# Patient Record
Sex: FEMALE | Race: WHITE | NOT HISPANIC OR LATINO | Employment: UNEMPLOYED | URBAN - METROPOLITAN AREA
[De-identification: names, ages, dates, MRNs, and addresses within clinical notes are randomized per-mention and may not be internally consistent; named-entity substitution may affect disease eponyms.]

---

## 2019-10-28 ENCOUNTER — OFFICE VISIT CONVERTED (OUTPATIENT)
Dept: PLASTIC SURGERY | Facility: CLINIC | Age: 34
End: 2019-10-28
Attending: PLASTIC SURGERY

## 2019-12-26 ENCOUNTER — HOSPITAL ENCOUNTER (OUTPATIENT)
Dept: OTHER | Facility: HOSPITAL | Age: 34
Discharge: HOME | End: 2019-12-26
Attending: PLASTIC SURGERY

## 2019-12-26 LAB — NICOTINE UR-MCNC: NEGATIVE NG/ML

## 2019-12-27 ENCOUNTER — OFFICE VISIT CONVERTED (OUTPATIENT)
Dept: PLASTIC SURGERY | Facility: CLINIC | Age: 34
End: 2019-12-27
Attending: PLASTIC SURGERY

## 2020-01-08 ENCOUNTER — HOSPITAL ENCOUNTER (OUTPATIENT)
Dept: PERIOP | Facility: HOSPITAL | Age: 35
Setting detail: HOSPITAL OUTPATIENT SURGERY
Discharge: HOME | End: 2020-01-08
Attending: PLASTIC SURGERY

## 2020-01-08 ENCOUNTER — PROCEDURE VISIT CONVERTED (OUTPATIENT)
Dept: OTHER | Facility: HOSPITAL | Age: 35
End: 2020-01-08
Attending: PLASTIC SURGERY

## 2020-01-08 LAB
HCG UR QL: NEGATIVE
NICOTINE UR-MCNC: NEGATIVE NG/ML

## 2020-01-10 ENCOUNTER — OFFICE VISIT CONVERTED (OUTPATIENT)
Dept: PLASTIC SURGERY | Facility: CLINIC | Age: 35
End: 2020-01-10
Attending: PLASTIC SURGERY

## 2020-02-07 ENCOUNTER — OFFICE VISIT CONVERTED (OUTPATIENT)
Dept: PLASTIC SURGERY | Facility: CLINIC | Age: 35
End: 2020-02-07
Attending: PLASTIC SURGERY

## 2020-04-30 ENCOUNTER — TELEMEDICINE CONVERTED (OUTPATIENT)
Dept: PLASTIC SURGERY | Facility: CLINIC | Age: 35
End: 2020-04-30
Attending: PLASTIC SURGERY

## 2020-06-25 ENCOUNTER — OFFICE VISIT CONVERTED (OUTPATIENT)
Dept: PLASTIC SURGERY | Facility: CLINIC | Age: 35
End: 2020-06-25
Attending: PLASTIC SURGERY

## 2020-06-25 ENCOUNTER — CONVERSION ENCOUNTER (OUTPATIENT)
Dept: PLASTIC SURGERY | Facility: CLINIC | Age: 35
End: 2020-06-25

## 2020-07-20 ENCOUNTER — HOSPITAL ENCOUNTER (OUTPATIENT)
Dept: PREADMISSION TESTING | Facility: HOSPITAL | Age: 35
Discharge: HOME | End: 2020-07-20
Attending: PLASTIC SURGERY

## 2020-07-20 LAB — SARS-COV-2 RNA SPEC QL NAA+PROBE: NOT DETECTED

## 2020-07-22 ENCOUNTER — HOSPITAL ENCOUNTER (OUTPATIENT)
Dept: PERIOP | Facility: HOSPITAL | Age: 35
Setting detail: HOSPITAL OUTPATIENT SURGERY
Discharge: HOME | End: 2020-07-22
Attending: PLASTIC SURGERY

## 2020-07-22 LAB
HCG UR QL: NEGATIVE
NICOTINE UR-MCNC: POSITIVE NG/ML

## 2020-07-27 ENCOUNTER — OFFICE VISIT CONVERTED (OUTPATIENT)
Dept: PLASTIC SURGERY | Facility: CLINIC | Age: 35
End: 2020-07-27
Attending: PLASTIC SURGERY

## 2020-08-27 ENCOUNTER — OFFICE VISIT CONVERTED (OUTPATIENT)
Dept: PLASTIC SURGERY | Facility: CLINIC | Age: 35
End: 2020-08-27
Attending: PLASTIC SURGERY

## 2020-10-29 ENCOUNTER — OFFICE VISIT CONVERTED (OUTPATIENT)
Dept: PLASTIC SURGERY | Facility: CLINIC | Age: 35
End: 2020-10-29
Attending: PLASTIC SURGERY

## 2020-11-09 ENCOUNTER — HOSPITAL ENCOUNTER (OUTPATIENT)
Dept: MRI IMAGING | Facility: HOSPITAL | Age: 35
Discharge: HOME | End: 2020-11-09

## 2021-03-04 ENCOUNTER — HOSPITAL ENCOUNTER (OUTPATIENT)
Dept: GENERAL RADIOLOGY | Facility: HOSPITAL | Age: 36
Discharge: HOME | End: 2021-03-04
Attending: NURSE PRACTITIONER

## 2021-05-12 NOTE — PROGRESS NOTES
"   Progress Note      Patient Name: Sead Simmons   Patient ID: [id number]   Sex: Female   YOB: 1985    Primary Care Provider: Tad OCHOA   Referring Provider: Aleyda QUINTEROS    Visit Date: April 30, 2020    Provider: Nicky Boles MD   Location: [facility]   Location Address: [address] [place] [address]  [id number]   Location Phone: [phone]          History Of Present Illness  Seda Simmons is a 34 year old /White female who presents to the office today as a consult from Aleyda QUINTEROS. S/p right breast scar revision, fat grafting to the right breast and bilateral breast augmentation 1/8/2020. doing well   Video Conferencing Visit  Seda Simmons is a 35 year old /White female who is presenting for evaluation via video conferencing. Verbal consent obtained before beginning visit. Doing well but implant appears to have slid down on left after feeling \" pop\"   The following staff were present during this visit: MYKE Orozco       Past Medical History  Allergies; Anemia; Anxiety; Arthritis; Chest pain; Depression; GERD; Head injury; Hemorrhoids; Left Gluteus Medius Insufficiency; Limb Swelling; Migraine Headaches; Seasonal allergies; Shortness of Breath         Past Surgical History  Breast augmentation; Dilation and Curettage; endoscopy; Fat grafting; Joint Surgery; Metal implants; Removal of hardware; Splenectomy         Medication List  ibuprofen 200 mg oral tablet; Imitrex 100 mg oral tablet         Allergy List  Bee Stings; Latex Exam Gloves; Metrogel         Family Medical History  Colon Neoplasm, Malignant; Lung Neoplasm, Malignant; Heart Disease; Cancer, Unspecified; Blood Clot; Diabetes; Uterine Cancer, Family History; Family history of certain chronic disabling diseases; arthritis; Osteoporosis         Social History  Alcohol (Current some day); Alcohol Use (Current " some day); Claustophobic (Unknown); .; lives with children; lives with other; lives with parents; Recreational Drug Use (Never); Single.; Student.; Tobacco (Former); Working         Review of Systems  · Constitutional  o Denies  o : fatigue, night sweats  · Eyes  o Denies  o : double vision, blurred vision  · HENT  o Denies  o : vertigo, recent head injury  · Breasts  o Denies  o : abnormal changes in breast size, additional breast symptoms except as noted in the HPI  · Cardiovascular  o Denies  o : chest pain, irregular heart beats  · Respiratory  o Denies  o : shortness of breath, productive cough  · Gastrointestinal  o Denies  o : nausea, vomiting  · Genitourinary  o Denies  o : dysuria, urinary retention  · Integument  o Denies  o : hair growth change, new skin lesions  · Neurologic  o Denies  o : altered mental status, seizures  · Musculoskeletal  o Denies  o : joint swelling, limitation of motion  · Endocrine  o Denies  o : cold intolerance, heat intolerance  · Heme-Lymph  o Denies  o : petechiae, lymph node enlargement or tenderness  · Allergic-Immunologic  o Denies  o : frequent illnesses      Physical Examination  · Constitutional  o Appearance  o : well developed, well-nourished, Alert and oriented X3  · Respiratory  o Respiratory Effort  o : breathing unlabored   · Skin and Subcutaneous Tissue  o Surgical Incision  o : healed incisions, improved contour, left implant has settled lower than right side raising nipple higher on left side          Assessment  · Breast deformity     611.89/N64.89  · Chest injuries, sequela     908.9/S29.9XXS      Plan  · Orders  o Plastics reconstructive visit (PSREC) - - 04/30/2020  · Medications  o Medications have been Reconciled  o Transition of Care or Provider Policy  · Instructions  o rtc 3 months, aquaphor and scar massage, keep support bra on, may have to revise fold             Electronically Signed by: Nicky Boles MD -Author on April 30, 2020 04:16:20  PM

## 2021-05-13 NOTE — PROGRESS NOTES
Progress Note      Patient Name: Seda Simmons   Patient ID: [id number]   Sex: Female   YOB: 1985    Primary Care Provider: Tad OCHOA   Referring Provider: Aleyda QUINTEROS    Visit Date: August 27, 2020    Provider: Nicky Boles MD   Location: [facility]   Location Address: [address] [place] [address]  [id number]   Location Phone: [phone]          History Of Present Illness  Seda Simmons is a 35 year old /White female who presents to the office today as a consult from Aleyda QUINTEROS. S/P revision of Right Reconstructed Breast with Skin Excision and Removal of Left Breast implant replacement of larger implant. Pain level 3/10. Pt reports trouble sleeping, pleased with results       Past Medical History  Allergies; Anemia; Anxiety; Arthritis; Chest pain; Depression; GERD; Head injury; Hemorrhoids; Left Gluteus Medius Insufficiency; Limb Swelling; Migraine Headaches; Seasonal allergies; Shortness of Breath         Past Surgical History  Breast augmentation; Dilation and Curettage; endoscopy; Fat grafting; Joint Surgery; Metal implants; Removal of hardware; Splenectomy; Wrist Surgery         Medication List  ibuprofen 200 mg oral tablet; Imitrex 100 mg oral tablet; Klonopin 1 mg oral tablet         Allergy List  Bee Stings; Latex Exam Gloves; Metrogel         Family Medical History  Colon Neoplasm, Malignant; Lung Neoplasm, Malignant; Heart Disease; Cancer, Unspecified; Blood Clot; Diabetes; Uterine Cancer, Family History; Family history of certain chronic disabling diseases; arthritis; Osteoporosis         Social History  Alcohol (Current some day); Alcohol Use (Current some day); Claustophobic (Unknown); .; lives with children; lives with other; lives with parents; Recreational Drug Use (Never); Single.; Student.; Tobacco (Former); Working         Vitals  Date Time BP Position Site L\R  "Cuff Size HR RR TEMP (F) WT  HT  BMI kg/m2 BSA m2 O2 Sat HC       08/27/2020 01:19 /69 Sitting    60 - R  98 116lbs 4oz 5'  8\" 17.68 1.59 98 %          Physical Examination  · Respiratory  o Respiratory Effort  o : breathing unlabored   · Cardiovascular  o Heart  o : regular rate  · Skin and Subcutaneous Tissue  o General Inspection  o : incision healing well, expected swelling, implants in good position          Assessment  · Postoperative follow-up     V67.00/Z09      Plan  · Orders  o Plastics reconstructive visit (PSREC) - - 08/27/2020  · Medications  o Medications have been Reconciled  o Transition of Care or Provider Policy  · Instructions  o Aquaphor and scar massage, bra ,rtc 3 months  o Electronically Identified Patient Education Materials Provided Electronically            Electronically Signed by: Nicky Boles MD -Author on August 28, 2020 10:35:31 AM  "

## 2021-05-13 NOTE — PROGRESS NOTES
Progress Note      Patient Name: Seda Simmons   Patient ID: [id number]   Sex: Female   YOB: 1985    Primary Care Provider: Tad OCHOA   Referring Provider: Aleyda QUINTEROS    Visit Date: June 25, 2020    Provider: Nicky Boles MD   Location: [facility]   Location Address: [address] [place] [address]  [id number]   Location Phone: [phone]          Chief Complaint  · Follow up       History Of Present Illness  Seda Simmons is a 35 year old /White female who presents to the office today as a consult from Aleyda QUINTEROS. S/p right breast scar revision, fat grafting to the right breast and bilateral breast augmentation 1/8/2020. Doing well but implant appears lower on the right and larger on the right       Past Medical History  Allergies; Anemia; Anxiety; Arthritis; Chest pain; Depression; GERD; Head injury; Hemorrhoids; Left Gluteus Medius Insufficiency; Limb Swelling; Migraine Headaches; Seasonal allergies; Shortness of Breath         Past Surgical History  Breast augmentation; Dilation and Curettage; endoscopy; Fat grafting; Joint Surgery; Metal implants; Removal of hardware; Splenectomy; Wrist Surgery         Medication List  ibuprofen 200 mg oral tablet; Imitrex 100 mg oral tablet; Klonopin 1 mg oral tablet         Allergy List  Bee Stings; Latex Exam Gloves; Metrogel       Allergies Reconciled  Family Medical History  Colon Neoplasm, Malignant; Lung Neoplasm, Malignant; Heart Disease; Cancer, Unspecified; Blood Clot; Diabetes; Uterine Cancer, Family History; Family history of certain chronic disabling diseases; arthritis; Osteoporosis         Social History  Alcohol (Current some day); Alcohol Use (Current some day); Claustophobic (Unknown); .; lives with children; lives with other; lives with parents; Recreational Drug Use (Never); Single.; Student.; Tobacco (Former); Working  "        Review of Systems  · Cardiovascular  o Denies  o : chest pain, lower extremity edema, Heart Attack, Abnormal EKG  · Respiratory  o Denies  o : shortness of breath, wheezing, cough  · Neurologic  o Denies  o : muscular weakness, incoordination, tingling or numbness, headaches  · Psychiatric  o Denies  o : anxiety, depression, difficulty sleeping      Vitals  Date Time BP Position Site L\R Cuff Size HR RR TEMP (F) WT  HT  BMI kg/m2 BSA m2 O2 Sat        06/25/2020 10:22 AM 94/65 Sitting    73 - R    5'  8\"   97 %          Physical Examination  · Constitutional  o Appearance  o : well developed, well-nourished, Alert and oriented X3  · Eyes  o Sclerae  o : sclerae white  · Respiratory  o Respiratory Effort  o : breathing unlabored   · Cardiovascular  o Heart  o : regular rate  · Breasts  o FEMALE BREAST EXAM  o : excess skin right inferior breast, right breast larger than left  · Lymphatic  o Axilla  o : No lymphadenopathy present  · Skin and Subcutaneous Tissue  o General Inspection  o : no lesions present, no areas of discoloration, skin turgor normal, texture normal  · Psychiatric  o Judgement and Insight  o : judgment and insight intact  o Mood and Affect  o : mood normal, affect appropriate          Assessment  · Pre-operative examination     V72.84/Z01.818  · Breast asymmetry     611.89/N64.89    Problems Reconciled  Plan  · Orders  o Plastics reconstructive visit (PSREC) - - 06/25/2020  o Cotinine screen urine (done at [facility] PAT) (79452) - - 06/25/2020  o Cotinine screen urine (done at [facility] SDS) (68962) - - 06/25/2020  o [facility] Pre-Op Covid-19 Screening (08780) - - 06/25/2020  · Medications  o Medications have been Reconciled  o Transition of Care or Provider Policy  · Instructions  o Surgical Facility: [facility]   o ****Surgical Orders****  o ****Patient Status****  o Outpatient  o ********************  o RISK AND BENEFITS:  o Consent for surgery: Given these options, the patient has verbally " expressed an understanding of the risks of surgery and finds these risks acceptable. We will proceed with surgery as soon as possible.  o O.R. PREP: Per protocol  o IV: Per Anesthesia  o SCD's preoperatively  o Please sign permit for: Revision of right reconstructed breast with skin excision and removal of left breast implant and placement of larger breast implant  o Please do not give any anethesia until patient's site has been marked.   o *******************************************  o PRE- OP MEDICATION ORDER:  o *******************************************  o Kefzol 2 grams IV on call to OR.  o The above History and Physical Examination has been completed within 30 days of admission.  o ********************  o Patient has a much improved result but still has some asymmetry. In order to achieve symmetry we will plan excision of excess skin on the right and replace the left implant with larger implant.            Electronically Signed by: Nicky Boles MD -Author on June 25, 2020 03:23:27 PM

## 2021-05-13 NOTE — PROGRESS NOTES
Progress Note      Patient Name: Seda Simmons   Patient ID: [id number]   Sex: Female   YOB: 1985    Primary Care Provider: Tad OCHOA   Referring Provider: Aleyda QUINTEROS    Visit Date: October 29, 2020    Provider: Nicky Boles MD   Location: [facility]   Location Address: [address] [place] [address]  [id number]   Location Phone: [phone]          History Of Present Illness  Seda Simmons is a 35 year old /White female who presents to the office today as a consult from Aleyda QUINTEROS. S/P revision of Right Reconstructed Breast with Skin Excision and Removal of Left Breast implant replacement of larger implant. doing well, pleased with results       Past Medical History  Allergies; Anemia; Anxiety; Arthritis; Chest pain; Depression; GERD; Head injury; Hemorrhoids; Left Gluteus Medius Insufficiency; Limb Swelling; Migraine Headaches; Seasonal allergies; Shortness of Breath         Past Surgical History  Breast augmentation; Dilation and Curettage; endoscopy; Fat grafting; Joint Surgery; Metal implants; Removal of hardware; Splenectomy; Wrist Surgery         Medication List  Imitrex 100 mg oral tablet; Klonopin 1 mg oral tablet         Allergy List  Bee Stings; Latex Exam Gloves; Metrogel         Family Medical History  Colon Neoplasm, Malignant; Lung Neoplasm, Malignant; Heart Disease; Cancer, Unspecified; Blood Clot; Diabetes; Uterine Cancer, Family History; Family history of certain chronic disabling diseases; arthritis; Osteoporosis         Social History  Alcohol (Current some day); Alcohol Use (Current some day); Claustophobic (Unknown); .; lives with children; lives with other; lives with parents; Recreational Drug Use (Never); Single.; Student.; Tobacco (Former); Working         Vitals  Date Time BP Position Site L\R Cuff Size HR RR TEMP (F) WT  HT  BMI kg/m2 BSA m2 O2 Sat FR  L/min FiO2        10/29/2020 01:31 PM 98/63 Sitting    83 - R  97.5     97 %  21%          Physical Examination  · Respiratory  o Respiratory Effort  o : breathing unlabored   · Cardiovascular  o Heart  o : regular rate  · Skin and Subcutaneous Tissue  o General Inspection  o : incision healing well, expected swelling, implants in good position          Assessment  · Postoperative follow-up     V67.00/Z09      Plan  · Medications  o Medications have been Reconciled  o Transition of Care or Provider Policy  · Instructions  o Aquaphor and scar massage, rtc 1 year            Electronically Signed by: Nicky Boles MD -Author on October 29, 2020 03:16:11 PM

## 2021-05-13 NOTE — PROGRESS NOTES
Progress Note      Patient Name: Seda Simmons   Patient ID: [id number]   Sex: Female   YOB: 1985    Primary Care Provider: Tad OCHOA   Referring Provider: Aleyda QUINTEROS    Visit Date: July 27, 2020    Provider: Nicky Boles MD   Location: [facility]   Location Address: [address] [place] [address]  [id number]   Location Phone: [phone]          Chief Complaint  · follow up      History Of Present Illness  Seda Simmons is a 35 year old /White female who presents to the office today as a consult from Aleyda QUINTEROS. S/P revision of Right Reconstructed Breast with Skin Excision and Removal of Left Breast implant replacement of larger implant. Pain level 3/10. Pt reports trouble sleeping.       Past Medical History  Allergies; Anemia; Anxiety; Arthritis; Chest pain; Depression; GERD; Head injury; Hemorrhoids; Left Gluteus Medius Insufficiency; Limb Swelling; Migraine Headaches; Seasonal allergies; Shortness of Breath         Past Surgical History  Breast augmentation; Dilation and Curettage; endoscopy; Fat grafting; Joint Surgery; Metal implants; Removal of hardware; Splenectomy; Wrist Surgery         Medication List  ibuprofen 200 mg oral tablet; Imitrex 100 mg oral tablet; Klonopin 1 mg oral tablet         Allergy List  Bee Stings; Latex Exam Gloves; Metrogel         Family Medical History  Colon Neoplasm, Malignant; Lung Neoplasm, Malignant; Heart Disease; Cancer, Unspecified; Blood Clot; Diabetes; Uterine Cancer, Family History; Family history of certain chronic disabling diseases; arthritis; Osteoporosis         Social History  Alcohol (Current some day); Alcohol Use (Current some day); Claustophobic (Unknown); .; lives with children; lives with other; lives with parents; Recreational Drug Use (Never); Single.; Student.; Tobacco (Former); Working         Review of  Systems  · Cardiovascular  o Denies  o : chest pain, lower extremity edema, Heart Attack, Abnormal EKG  · Respiratory  o Denies  o : shortness of breath, wheezing, cough  · Neurologic  o Denies  o : muscular weakness, incoordination, tingling or numbness, headaches  · Psychiatric  o Denies  o : anxiety, depression, difficulty sleeping      Vitals  Date Time BP Position Site L\R Cuff Size HR RR TEMP (F) WT  HT  BMI kg/m2 BSA m2 O2 Sat HC       07/27/2020 08:56 /65 Sitting    51 - R  98.1     96 %          Physical Examination  · Respiratory  o Respiratory Effort  o : breathing unlabored   · Cardiovascular  o Heart  o : regular rate  · Skin and Subcutaneous Tissue  o General Inspection  o : incision healing well, expected swelling, no erythema, mild drainage on steri strips              Plan  · Orders  o Plastics reconstructive visit (PSREC) - - 07/27/2020  · Medications  o Medications have been Reconciled  o Transition of Care or Provider Policy  · Instructions  o changed steri strips, ok to shower, continue bra, no heavy lifting, rtc 1 month            Electronically Signed by: Nicky Boles MD -Author on July 27, 2020 09:25:21 AM

## 2021-05-14 VITALS
OXYGEN SATURATION: 98 % | TEMPERATURE: 98 F | SYSTOLIC BLOOD PRESSURE: 121 MMHG | BODY MASS INDEX: 17.62 KG/M2 | HEIGHT: 68 IN | HEART RATE: 60 BPM | WEIGHT: 116.25 LBS | DIASTOLIC BLOOD PRESSURE: 69 MMHG

## 2021-05-14 VITALS
OXYGEN SATURATION: 97 % | TEMPERATURE: 97.5 F | HEART RATE: 83 BPM | DIASTOLIC BLOOD PRESSURE: 63 MMHG | SYSTOLIC BLOOD PRESSURE: 98 MMHG

## 2021-05-15 VITALS
SYSTOLIC BLOOD PRESSURE: 105 MMHG | DIASTOLIC BLOOD PRESSURE: 70 MMHG | OXYGEN SATURATION: 99 % | HEIGHT: 68 IN | HEART RATE: 63 BPM

## 2021-05-15 VITALS
HEIGHT: 68 IN | SYSTOLIC BLOOD PRESSURE: 104 MMHG | OXYGEN SATURATION: 97 % | DIASTOLIC BLOOD PRESSURE: 67 MMHG | HEART RATE: 100 BPM | BODY MASS INDEX: 18.49 KG/M2 | WEIGHT: 122 LBS

## 2021-05-15 VITALS
DIASTOLIC BLOOD PRESSURE: 65 MMHG | SYSTOLIC BLOOD PRESSURE: 94 MMHG | HEART RATE: 73 BPM | OXYGEN SATURATION: 97 % | HEIGHT: 68 IN

## 2021-05-15 VITALS
SYSTOLIC BLOOD PRESSURE: 107 MMHG | DIASTOLIC BLOOD PRESSURE: 65 MMHG | TEMPERATURE: 98.1 F | HEART RATE: 51 BPM | OXYGEN SATURATION: 96 %

## 2021-05-15 VITALS
HEART RATE: 64 BPM | OXYGEN SATURATION: 100 % | BODY MASS INDEX: 19.02 KG/M2 | DIASTOLIC BLOOD PRESSURE: 73 MMHG | WEIGHT: 125.5 LBS | SYSTOLIC BLOOD PRESSURE: 117 MMHG | HEIGHT: 68 IN

## 2021-05-15 VITALS — OXYGEN SATURATION: 98 % | DIASTOLIC BLOOD PRESSURE: 75 MMHG | HEART RATE: 96 BPM | SYSTOLIC BLOOD PRESSURE: 120 MMHG

## 2021-07-02 ENCOUNTER — PREP FOR SURGERY (OUTPATIENT)
Dept: OTHER | Facility: HOSPITAL | Age: 36
End: 2021-07-02

## 2021-07-02 ENCOUNTER — OFFICE VISIT (OUTPATIENT)
Dept: GASTROENTEROLOGY | Facility: CLINIC | Age: 36
End: 2021-07-02

## 2021-07-02 VITALS
DIASTOLIC BLOOD PRESSURE: 68 MMHG | BODY MASS INDEX: 16.91 KG/M2 | OXYGEN SATURATION: 100 % | SYSTOLIC BLOOD PRESSURE: 114 MMHG | HEART RATE: 75 BPM | HEIGHT: 68 IN | RESPIRATION RATE: 20 BRPM | WEIGHT: 111.6 LBS

## 2021-07-02 DIAGNOSIS — Z80.9: ICD-10-CM

## 2021-07-02 DIAGNOSIS — R13.10 DYSPHAGIA: Primary | ICD-10-CM

## 2021-07-02 DIAGNOSIS — R13.12 OROPHARYNGEAL DYSPHAGIA: Primary | ICD-10-CM

## 2021-07-02 DIAGNOSIS — R19.4 ALTERED BOWEL HABITS: ICD-10-CM

## 2021-07-02 DIAGNOSIS — R10.9 ABDOMINAL PAIN: ICD-10-CM

## 2021-07-02 DIAGNOSIS — R10.84 GENERALIZED ABDOMINAL PAIN: ICD-10-CM

## 2021-07-02 PROBLEM — R06.02 SHORTNESS OF BREATH: Status: ACTIVE | Noted: 2021-07-02

## 2021-07-02 PROBLEM — F32.A DEPRESSION: Status: ACTIVE | Noted: 2021-07-02

## 2021-07-02 PROBLEM — M79.89 LIMB SWELLING: Status: ACTIVE | Noted: 2021-07-02

## 2021-07-02 PROBLEM — S09.90XA HEAD INJURY: Status: ACTIVE | Noted: 2021-07-02

## 2021-07-02 PROBLEM — M19.90 ARTHRITIS: Status: ACTIVE | Noted: 2021-07-02

## 2021-07-02 PROBLEM — J30.2 SEASONAL ALLERGIC RHINITIS: Status: ACTIVE | Noted: 2021-07-02

## 2021-07-02 PROBLEM — J01.80 OTHER ACUTE SINUSITIS: Status: ACTIVE | Noted: 2021-07-02

## 2021-07-02 PROBLEM — K64.9 HEMORRHOIDS: Status: ACTIVE | Noted: 2021-07-02

## 2021-07-02 PROBLEM — F41.9 ANXIETY: Status: ACTIVE | Noted: 2021-07-02

## 2021-07-02 PROCEDURE — 99204 OFFICE O/P NEW MOD 45 MIN: CPT | Performed by: NURSE PRACTITIONER

## 2021-07-02 RX ORDER — AMITRIPTYLINE HYDROCHLORIDE 10 MG/1
TABLET, FILM COATED ORAL
COMMUNITY
End: 2021-07-02

## 2021-07-02 RX ORDER — DICYCLOMINE HCL 20 MG
20 TABLET ORAL EVERY 6 HOURS
Qty: 60 TABLET | Refills: 2 | Status: SHIPPED | OUTPATIENT
Start: 2021-07-02 | End: 2021-10-28

## 2021-07-02 RX ORDER — CLONAZEPAM 1 MG/1
1 TABLET ORAL 2 TIMES DAILY PRN
COMMUNITY
End: 2021-10-28

## 2021-07-02 RX ORDER — OMEPRAZOLE 40 MG/1
40 CAPSULE, DELAYED RELEASE ORAL DAILY
COMMUNITY
End: 2021-07-02

## 2021-07-02 RX ORDER — SUMATRIPTAN 100 MG/1
TABLET, FILM COATED ORAL
COMMUNITY
End: 2022-04-28

## 2021-07-02 RX ORDER — PROPRANOLOL HYDROCHLORIDE 20 MG/1
20 TABLET ORAL 3 TIMES DAILY
COMMUNITY
End: 2022-01-05 | Stop reason: SDUPTHER
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
## 2021-07-02 NOTE — PATIENT INSTRUCTIONS
Dysphagia    Dysphagia is trouble swallowing. This condition occurs when solids and liquids stick in a person's throat on the way down to the stomach, or when food takes longer to get to the stomach than usual.  You may have problems swallowing food, liquids, or both. You may also have pain while trying to swallow. It may take you more time and effort to swallow something.  What are the causes?  This condition may be caused by:  · Muscle problems. They may make it difficult for you to move food and liquids through the esophagus, which is the tube that connects your mouth to your stomach.  · Blockages. You may have ulcers, scar tissue, or inflammation that blocks the normal passage of food and liquids. Causes of these problems include:  ? Acid reflux from your stomach into your esophagus (gastroesophageal reflux).  ? Infections.  ? Radiation treatment for cancer.  ? Medicines taken without enough fluids to wash them down into your stomach.  · Stroke. This can affect the nerves and make it difficult to swallow.  · Nerve problems. These prevent signals from being sent to the muscles of your esophagus to squeeze (contract) and move what you swallow down to your stomach.  · Globus pharyngeus. This is a common problem that involves a feeling like something is stuck in your throat or a sense of trouble with swallowing, even though nothing is wrong with the swallowing passages.  · Certain conditions, such as cerebral palsy or Parkinson's disease.  What are the signs or symptoms?  Common symptoms of this condition include:  · A feeling that solids or liquids are stuck in your throat on the way down to the stomach.  · Pain while swallowing.  · Coughing or gagging while trying to swallow.  Other symptoms include:  · Food moving back from your stomach to your mouth (regurgitation).  · Noises coming from your throat.  · Chest discomfort with swallowing.  · A feeling of fullness when swallowing.  · Drooling, especially when the  throat is blocked.  · Heartburn.  How is this diagnosed?  This condition may be diagnosed by:  · Barium X-ray. In this test, you will swallow a white liquid that sticks to the inside of your esophagus. X-ray images are then taken.  · Endoscopy. In this test, a flexible telescope is inserted down your throat to look at your esophagus and your stomach.  · CT scans and an MRI.  How is this treated?  Treatment for dysphagia depends on the cause of this condition, such as:  · If the dysphagia is caused by acid reflux or infection, medicines may be used. They may include antibiotics and heartburn medicines.  · If the dysphagia is caused by problems with the muscles, swallowing therapy may be used to help you strengthen your swallowing muscles. You may have to do specific exercises to strengthen the muscles or stretch them.  · If the dysphagia is caused by a blockage or mass, procedures to remove the blockage may be done. You may need surgery and a feeding tube.  You may need to make diet changes. Ask your health care provider for specific instructions.  Follow these instructions at home:  Medicines  · Take over-the-counter and prescription medicines only as told by your health care provider.  · If you were prescribed an antibiotic medicine, take it as told by your health care provider. Do not stop taking the antibiotic even if you start to feel better.  Eating and drinking    · Follow any diet changes as told by your health care provider.  · Work with a diet and nutrition specialist (dietitian) to create an eating plan that will help you get the nutrients you need in order to stay healthy.  · Eat soft foods that are easier to swallow.  · Cut your food into small pieces and eat slowly. Take small bites.  · Eat and drink only when you are sitting upright.  · Do not drink alcohol or caffeine. If you need help quitting, ask your health care provider.  General instructions  · Check your weight every day to make sure you are  not losing weight.  · Do not use any products that contain nicotine or tobacco, such as cigarettes, e-cigarettes, and chewing tobacco. If you need help quitting, ask your health care provider.  · Keep all follow-up visits as told by your health care provider. This is important.  Contact a health care provider if you:  · Lose weight because you cannot swallow.  · Cough when you drink liquids.  · Cough up partially digested food.  Get help right away if you:  · Cannot swallow your saliva.  · Have shortness of breath, a fever, or both.  · Have a hoarse voice and also have trouble swallowing.  Summary  · Dysphagia is trouble swallowing. This condition occurs when solids and liquids stick in a person's throat on the way down to the stomach. You may cough or gag while trying to swallow.  · Dysphagia has many possible causes.  · Treatment for dysphagia depends on the cause of the condition.  · Keep all follow-up visits as told by your health care provider. This is important.  This information is not intended to replace advice given to you by your health care provider. Make sure you discuss any questions you have with your health care provider.  Document Revised: 05/13/2020 Document Reviewed: 05/13/2020  [employer] Patient Education © 2021 [employer].

## 2021-07-06 ENCOUNTER — TRANSCRIBE ORDERS (OUTPATIENT)
Dept: LAB | Facility: HOSPITAL | Age: 36
End: 2021-07-06

## 2021-07-06 ENCOUNTER — LAB (OUTPATIENT)
Dept: LAB | Facility: HOSPITAL | Age: 36
End: 2021-07-06

## 2021-07-06 DIAGNOSIS — Z11.59 NEED FOR HEPATITIS C SCREENING TEST: ICD-10-CM

## 2021-07-06 DIAGNOSIS — Z11.4 SCREENING FOR HIV (HUMAN IMMUNODEFICIENCY VIRUS): ICD-10-CM

## 2021-07-06 DIAGNOSIS — E06.9 THYROIDITIS: ICD-10-CM

## 2021-07-06 DIAGNOSIS — B37.0 THRUSH: ICD-10-CM

## 2021-07-06 DIAGNOSIS — B37.0 THRUSH: Primary | ICD-10-CM

## 2021-07-06 LAB
ALBUMIN SERPL-MCNC: 4.2 G/DL (ref 3.5–5.2)
ALBUMIN/GLOB SERPL: 1.6 G/DL
ALP SERPL-CCNC: 62 U/L (ref 39–117)
ALT SERPL W P-5'-P-CCNC: <5 U/L (ref 1–33)
ANION GAP SERPL CALCULATED.3IONS-SCNC: 9.4 MMOL/L (ref 5–15)
AST SERPL-CCNC: 9 U/L (ref 1–32)
BASOPHILS # BLD AUTO: 0.06 10*3/MM3 (ref 0–0.2)
BASOPHILS NFR BLD AUTO: 1.1 % (ref 0–1.5)
BILIRUB SERPL-MCNC: 0.5 MG/DL (ref 0–1.2)
BUN SERPL-MCNC: 11 MG/DL (ref 6–20)
BUN/CREAT SERPL: 14.3 (ref 7–25)
CALCIUM SPEC-SCNC: 9 MG/DL (ref 8.6–10.5)
CHLORIDE SERPL-SCNC: 107 MMOL/L (ref 98–107)
CO2 SERPL-SCNC: 25.6 MMOL/L (ref 22–29)
CREAT SERPL-MCNC: 0.77 MG/DL (ref 0.57–1)
DEPRECATED RDW RBC AUTO: 43.9 FL (ref 37–54)
EOSINOPHIL # BLD AUTO: 0.07 10*3/MM3 (ref 0–0.4)
EOSINOPHIL NFR BLD AUTO: 1.3 % (ref 0.3–6.2)
ERYTHROCYTE [DISTWIDTH] IN BLOOD BY AUTOMATED COUNT: 12.9 % (ref 12.3–15.4)
GFR SERPL CREATININE-BSD FRML MDRD: 85 ML/MIN/1.73
GLOBULIN UR ELPH-MCNC: 2.6 GM/DL
GLUCOSE SERPL-MCNC: 84 MG/DL (ref 65–99)
HAV IGM SERPL QL IA: NORMAL
HBV CORE IGM SERPL QL IA: NORMAL
HBV SURFACE AG SERPL QL IA: NORMAL
HCT VFR BLD AUTO: 44.4 % (ref 34–46.6)
HCV AB SER DONR QL: NORMAL
HGB BLD-MCNC: 14.9 G/DL (ref 12–15.9)
HIV1+2 AB SER QL: NORMAL
IMM GRANULOCYTES # BLD AUTO: 0.01 10*3/MM3 (ref 0–0.05)
IMM GRANULOCYTES NFR BLD AUTO: 0.2 % (ref 0–0.5)
IRON 24H UR-MRATE: 115 MCG/DL (ref 37–145)
IRON SATN MFR SERPL: 27 % (ref 20–50)
LYMPHOCYTES # BLD AUTO: 2.1 10*3/MM3 (ref 0.7–3.1)
LYMPHOCYTES NFR BLD AUTO: 38.5 % (ref 19.6–45.3)
MCH RBC QN AUTO: 31.1 PG (ref 26.6–33)
MCHC RBC AUTO-ENTMCNC: 33.6 G/DL (ref 31.5–35.7)
MCV RBC AUTO: 92.7 FL (ref 79–97)
MONOCYTES # BLD AUTO: 0.69 10*3/MM3 (ref 0.1–0.9)
MONOCYTES NFR BLD AUTO: 12.7 % (ref 5–12)
NEUTROPHILS NFR BLD AUTO: 2.52 10*3/MM3 (ref 1.7–7)
NEUTROPHILS NFR BLD AUTO: 46.2 % (ref 42.7–76)
NRBC BLD AUTO-RTO: 0 /100 WBC (ref 0–0.2)
PLATELET # BLD AUTO: 367 10*3/MM3 (ref 140–450)
PMV BLD AUTO: 9.9 FL (ref 6–12)
POTASSIUM SERPL-SCNC: 4.5 MMOL/L (ref 3.5–5.2)
PROT SERPL-MCNC: 6.8 G/DL (ref 6–8.5)
RBC # BLD AUTO: 4.79 10*6/MM3 (ref 3.77–5.28)
SODIUM SERPL-SCNC: 142 MMOL/L (ref 136–145)
T4 FREE SERPL-MCNC: 1.22 NG/DL (ref 0.93–1.7)
TIBC SERPL-MCNC: 432 MCG/DL (ref 298–536)
TRANSFERRIN SERPL-MCNC: 290 MG/DL (ref 200–360)
TSH SERPL DL<=0.05 MIU/L-ACNC: 0.7 UIU/ML (ref 0.27–4.2)
WBC # BLD AUTO: 5.45 10*3/MM3 (ref 3.4–10.8)

## 2021-07-06 PROCEDURE — 86695 HERPES SIMPLEX TYPE 1 TEST: CPT

## 2021-07-06 PROCEDURE — 84466 ASSAY OF TRANSFERRIN: CPT

## 2021-07-06 PROCEDURE — 86696 HERPES SIMPLEX TYPE 2 TEST: CPT

## 2021-07-06 PROCEDURE — 86694 HERPES SIMPLEX NES ANTBDY: CPT

## 2021-07-06 PROCEDURE — 80074 ACUTE HEPATITIS PANEL: CPT

## 2021-07-06 PROCEDURE — 80053 COMPREHEN METABOLIC PANEL: CPT

## 2021-07-06 PROCEDURE — 36415 COLL VENOUS BLD VENIPUNCTURE: CPT

## 2021-07-06 PROCEDURE — 84439 ASSAY OF FREE THYROXINE: CPT

## 2021-07-06 PROCEDURE — 85025 COMPLETE CBC W/AUTO DIFF WBC: CPT

## 2021-07-06 PROCEDURE — 86376 MICROSOMAL ANTIBODY EACH: CPT

## 2021-07-06 PROCEDURE — 84443 ASSAY THYROID STIM HORMONE: CPT

## 2021-07-06 PROCEDURE — G0432 EIA HIV-1/HIV-2 SCREEN: HCPCS

## 2021-07-06 PROCEDURE — 83540 ASSAY OF IRON: CPT

## 2021-07-07 LAB — THYROPEROXIDASE AB SERPL-ACNC: <9 IU/ML (ref 0–34)

## 2021-07-09 LAB
HSV1 IGG SER IA-ACNC: 24.2 INDEX (ref 0–0.9)
HSV1+2 IGM SER IA-ACNC: 0.92 RATIO (ref 0–0.9)
HSV2 IGG SER IA-ACNC: 6.65 INDEX (ref 0–0.9)

## 2021-07-28 ENCOUNTER — OFFICE VISIT (OUTPATIENT)
Dept: PLASTIC SURGERY | Facility: CLINIC | Age: 36
End: 2021-07-28

## 2021-07-28 VITALS
DIASTOLIC BLOOD PRESSURE: 75 MMHG | HEART RATE: 91 BPM | TEMPERATURE: 98.1 F | SYSTOLIC BLOOD PRESSURE: 108 MMHG | OXYGEN SATURATION: 96 %

## 2021-07-28 DIAGNOSIS — N64.2 ATROPHY OF BREAST: Primary | ICD-10-CM

## 2021-07-28 PROCEDURE — 99212 OFFICE O/P EST SF 10 MIN: CPT | Performed by: PLASTIC SURGERY

## 2021-08-27 ENCOUNTER — LAB (OUTPATIENT)
Dept: LAB | Facility: HOSPITAL | Age: 36
End: 2021-08-27

## 2021-08-27 DIAGNOSIS — R13.10 DYSPHAGIA: ICD-10-CM

## 2021-08-27 DIAGNOSIS — R10.9 ABDOMINAL PAIN: ICD-10-CM

## 2021-08-27 DIAGNOSIS — Z80.9: ICD-10-CM

## 2021-08-27 PROCEDURE — U0003 INFECTIOUS AGENT DETECTION BY NUCLEIC ACID (DNA OR RNA); SEVERE ACUTE RESPIRATORY SYNDROME CORONAVIRUS 2 (SARS-COV-2) (CORONAVIRUS DISEASE [COVID-19]), AMPLIFIED PROBE TECHNIQUE, MAKING USE OF HIGH THROUGHPUT TECHNOLOGIES AS DESCRIBED BY CMS-2020-01-R: HCPCS

## 2021-08-27 PROCEDURE — C9803 HOPD COVID-19 SPEC COLLECT: HCPCS

## 2021-08-29 LAB — SARS-COV-2 RNA RESP QL NAA+PROBE: NOT DETECTED

## 2021-09-01 ENCOUNTER — HOSPITAL ENCOUNTER (OUTPATIENT)
Facility: HOSPITAL | Age: 36
Setting detail: HOSPITAL OUTPATIENT SURGERY
Discharge: HOME | End: 2021-09-01
Attending: INTERNAL MEDICINE | Admitting: INTERNAL MEDICINE

## 2021-09-01 ENCOUNTER — ANESTHESIA (OUTPATIENT)
Dept: GASTROENTEROLOGY | Facility: HOSPITAL | Age: 36
End: 2021-09-01

## 2021-09-01 ENCOUNTER — ANESTHESIA EVENT (OUTPATIENT)
Dept: GASTROENTEROLOGY | Facility: HOSPITAL | Age: 36
End: 2021-09-01

## 2021-09-01 VITALS
TEMPERATURE: 97.8 F | SYSTOLIC BLOOD PRESSURE: 106 MMHG | BODY MASS INDEX: 16.84 KG/M2 | HEART RATE: 77 BPM | OXYGEN SATURATION: 100 % | HEIGHT: 68 IN | DIASTOLIC BLOOD PRESSURE: 63 MMHG | RESPIRATION RATE: 18 BRPM | WEIGHT: 111.11 LBS

## 2021-09-01 DIAGNOSIS — R10.9 ABDOMINAL PAIN: ICD-10-CM

## 2021-09-01 DIAGNOSIS — R13.10 DYSPHAGIA: ICD-10-CM

## 2021-09-01 DIAGNOSIS — Z80.9: ICD-10-CM

## 2021-09-01 LAB — B-HCG UR QL: NEGATIVE

## 2021-09-01 PROCEDURE — 88305 TISSUE EXAM BY PATHOLOGIST: CPT | Performed by: INTERNAL MEDICINE

## 2021-09-01 PROCEDURE — 25010000002 PROPOFOL 10 MG/ML EMULSION: Performed by: NURSE ANESTHETIST, CERTIFIED REGISTERED

## 2021-09-01 PROCEDURE — 81025 URINE PREGNANCY TEST: CPT | Performed by: INTERNAL MEDICINE

## 2021-09-01 PROCEDURE — 43239 EGD BIOPSY SINGLE/MULTIPLE: CPT | Performed by: INTERNAL MEDICINE

## 2021-09-01 PROCEDURE — 45380 COLONOSCOPY AND BIOPSY: CPT | Performed by: INTERNAL MEDICINE

## 2021-09-01 RX ORDER — SODIUM CHLORIDE, SODIUM LACTATE, POTASSIUM CHLORIDE, CALCIUM CHLORIDE 600; 310; 30; 20 MG/100ML; MG/100ML; MG/100ML; MG/100ML
30 INJECTION, SOLUTION INTRAVENOUS CONTINUOUS
Status: DISCONTINUED | OUTPATIENT
Start: 2021-09-01 | End: 2021-09-01 | Stop reason: HOSPADM

## 2021-09-01 RX ORDER — LIDOCAINE HYDROCHLORIDE 20 MG/ML
INJECTION, SOLUTION INFILTRATION; PERINEURAL AS NEEDED
Status: DISCONTINUED | OUTPATIENT
Start: 2021-09-01 | End: 2021-09-01 | Stop reason: SURG

## 2021-09-01 RX ORDER — PROPOFOL 10 MG/ML
VIAL (ML) INTRAVENOUS AS NEEDED
Status: DISCONTINUED | OUTPATIENT
Start: 2021-09-01 | End: 2021-09-01 | Stop reason: SURG

## 2021-09-01 RX ADMIN — PROPOFOL 50 MG: 10 INJECTION, EMULSION INTRAVENOUS at 09:37

## 2021-09-01 RX ADMIN — SODIUM CHLORIDE, POTASSIUM CHLORIDE, SODIUM LACTATE AND CALCIUM CHLORIDE 30 ML/HR: 600; 310; 30; 20 INJECTION, SOLUTION INTRAVENOUS at 09:01

## 2021-09-01 RX ADMIN — LIDOCAINE HYDROCHLORIDE 50 MG: 20 INJECTION, SOLUTION INFILTRATION; PERINEURAL at 09:37

## 2021-09-01 RX ADMIN — PROPOFOL 125 MCG/KG/MIN: 10 INJECTION, EMULSION INTRAVENOUS at 09:37

## 2021-09-01 NOTE — ANESTHESIA POSTPROCEDURE EVALUATION
Patient: Seda Callowayrslucila    Procedure Summary     Date: 09/01/21 Room / Location: [facility] ENDOSCOPY 2 / [facility] ENDOSCOPY    Anesthesia Start: 0936 Anesthesia Stop: 1001    Procedures:       ESOPHAGOGASTRODUODENOSCOPY WITH BIOPSY (N/A )      COLONOSCOPY (N/A ) Diagnosis:       Dysphagia      Abdominal pain      Family history of malignant neoplasm in father      (Dysphagia [R13.10])      (Abdominal pain [R10.9])      (Family history of malignant neoplasm in father [Z80.9])    Surgeons: Haroon Collins MD Provider: Nikolai Brooks MD    Anesthesia Type: general ASA Status: 2          Anesthesia Type: general    Vitals  Vitals Value Taken Time   /63 09/01/21 1016   Temp 36.6 °C (97.8 °F) 09/01/21 1016   Pulse 70 09/01/21 1019   Resp 18 09/01/21 1016   SpO2 100 % 09/01/21 1019   Vitals shown include unvalidated device data.        Post Anesthesia Care and Evaluation    Patient location during evaluation: PHASE II  Patient participation: complete - patient participated  Level of consciousness: awake and alert  Pain score: 0  Pain management: adequate  Airway patency: patent  Anesthetic complications: No anesthetic complications  PONV Status: none  Cardiovascular status: acceptable  Respiratory status: acceptable  Hydration status: acceptable  No anesthesia care post op

## 2021-09-01 NOTE — H&P
Pre Procedure History & Physical    Chief Complaint:   gerd  Dysphagia  Altered bowel habits    Subjective     HPI:   As above    Past Medical History:   Past Medical History:   Diagnosis Date   • Allergies    • Anemia    • Anxiety    • Arthritis    • Chest pain     on Propranolol   • Condition not found 09/04/2015    left gluteus medius insufficiency   • Depression    • GERD (gastroesophageal reflux disease)    • Head injury    • Hemorrhoids    • History of MRSA infection     2010- WOUND COLINIZED   • Limb swelling    • Migraine headache    • PONV (postoperative nausea and vomiting)     slight nausea   • Shortness of breath    • Spinal headache     post epidural post child birth       Past Surgical History:  Past Surgical History:   Procedure Laterality Date   • BONY PELVIS SURGERY     • BREAST AUGMENTATION  01/08/2020   • DILATION AND CURETTAGE, DIAGNOSTIC / THERAPEUTIC  2004   • ENDOSCOPY  2007 2015   • FAT GRAFTING  01/08/2020    fat grafting to right breast    • HARDWARE REMOVAL  2011 2014   • HIP ORIF W/ CAPSULOTOMY     • LUNG SURGERY     • OTHER SURGICAL HISTORY      metal implants   • SPLENECTOMY  2010   • WRIST SURGERY  2020       Family History:  Family History   Problem Relation Age of Onset   • Heart disease Mother    • Arthritis Mother    • Colon cancer Father    • Cancer Father    • Arthritis Father    • Osteoporosis Father    • Lung cancer Other    • Clotting disorder Other    • Diabetes Other    • Uterine cancer Other    • Malig Hyperthermia Neg Hx        Social History:   reports that she has been smoking cigarettes. She has a 20.00 pack-year smoking history. She has never used smokeless tobacco. She reports current alcohol use. She reports that she does not use drugs.    Medications:   Medications Prior to Admission   Medication Sig Dispense Refill Last Dose   • clonazePAM (KlonoPIN) 1 MG tablet Take 1 mg by mouth 2 (Two) Times a Day As Needed.      • dicyclomine (BENTYL) 20 MG tablet Take 1 tablet  "by mouth Every 6 (Six) Hours. 60 tablet 2    • propranolol (INDERAL) 20 MG tablet Take 20 mg by mouth 3 (Three) Times a Day.      • SUMAtriptan (Imitrex) 100 MG tablet Imitrex 100 mg oral tablet take 1 tablet (100 mg) by oral route once with fluids as early as possible after the onset of a migraine attack;may repeat after 2 hours if headache returns, not to exceed 200mg in 24hrs   Active          Allergies:  Bee venom, Latex, Metronidazole, Omeprazole, and Paroxetine        Objective     Blood pressure 98/55, pulse 54, temperature 97 °F (36.1 °C), temperature source Temporal, resp. rate 16, height 172.8 cm (68.03\"), weight 50.4 kg (111 lb 1.8 oz), last menstrual period 08/16/2021, SpO2 99 %.    Physical Exam   Constitutional: Pt is oriented to person, place, and time and well-developed, well-nourished, and in no distress.   Mouth/Throat: Oropharynx is clear and moist.   Neck: Normal range of motion.   Cardiovascular: Normal rate, regular rhythm and normal heart sounds.    Pulmonary/Chest: Effort normal and breath sounds normal.   Abdominal: Soft. Nontender  Skin: Skin is warm and dry.   Psychiatric: Mood, memory, affect and judgment normal.     Assessment/Plan     Diagnosis:  gerd  Dysphagia  Altered bowel habits      Anticipated Surgical Procedure:  egd  colonoscopy    The risks, benefits, and alternatives of this procedure have been discussed with the patient or the responsible party- the patient understands and agrees to proceed.            "
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
## 2021-09-02 LAB
CYTO UR: NORMAL
LAB AP CASE REPORT: NORMAL
LAB AP CLINICAL INFORMATION: NORMAL
PATH REPORT.FINAL DX SPEC: NORMAL
PATH REPORT.GROSS SPEC: NORMAL

## 2021-10-28 ENCOUNTER — OFFICE VISIT (OUTPATIENT)
Dept: FAMILY MEDICINE CLINIC | Facility: CLINIC | Age: 36
End: 2021-10-28

## 2021-10-28 VITALS
TEMPERATURE: 97.6 F | BODY MASS INDEX: 16.07 KG/M2 | SYSTOLIC BLOOD PRESSURE: 122 MMHG | DIASTOLIC BLOOD PRESSURE: 64 MMHG | OXYGEN SATURATION: 99 % | HEART RATE: 76 BPM | WEIGHT: 114.8 LBS | HEIGHT: 71 IN

## 2021-10-28 DIAGNOSIS — F41.9 ANXIETY: ICD-10-CM

## 2021-10-28 DIAGNOSIS — R00.2 PALPITATIONS: Primary | ICD-10-CM

## 2021-10-28 DIAGNOSIS — F32.A DEPRESSION, UNSPECIFIED DEPRESSION TYPE: ICD-10-CM

## 2021-10-28 DIAGNOSIS — Z13.220 SCREENING FOR LIPID DISORDERS: ICD-10-CM

## 2021-10-28 LAB
ALBUMIN SERPL-MCNC: 5 G/DL (ref 3.5–5.2)
ALBUMIN/GLOB SERPL: 1.7 G/DL
ALP SERPL-CCNC: 72 U/L (ref 39–117)
ALT SERPL W P-5'-P-CCNC: 9 U/L (ref 1–33)
ANION GAP SERPL CALCULATED.3IONS-SCNC: 9.5 MMOL/L (ref 5–15)
AST SERPL-CCNC: 16 U/L (ref 1–32)
BASOPHILS # BLD AUTO: 0.06 10*3/MM3 (ref 0–0.2)
BASOPHILS NFR BLD AUTO: 0.8 % (ref 0–1.5)
BILIRUB SERPL-MCNC: 0.4 MG/DL (ref 0–1.2)
BUN SERPL-MCNC: 12 MG/DL (ref 6–20)
BUN/CREAT SERPL: 17.6 (ref 7–25)
CALCIUM SPEC-SCNC: 10 MG/DL (ref 8.6–10.5)
CHLORIDE SERPL-SCNC: 101 MMOL/L (ref 98–107)
CHOLEST SERPL-MCNC: 222 MG/DL (ref 0–200)
CO2 SERPL-SCNC: 26.5 MMOL/L (ref 22–29)
CREAT SERPL-MCNC: 0.68 MG/DL (ref 0.57–1)
DEPRECATED RDW RBC AUTO: 41.8 FL (ref 37–54)
EOSINOPHIL # BLD AUTO: 0.06 10*3/MM3 (ref 0–0.4)
EOSINOPHIL NFR BLD AUTO: 0.8 % (ref 0.3–6.2)
ERYTHROCYTE [DISTWIDTH] IN BLOOD BY AUTOMATED COUNT: 12.6 % (ref 12.3–15.4)
GFR SERPL CREATININE-BSD FRML MDRD: 98 ML/MIN/1.73
GLOBULIN UR ELPH-MCNC: 2.9 GM/DL
GLUCOSE SERPL-MCNC: 77 MG/DL (ref 65–99)
HCT VFR BLD AUTO: 46.6 % (ref 34–46.6)
HDLC SERPL-MCNC: 54 MG/DL (ref 40–60)
HGB BLD-MCNC: 15.7 G/DL (ref 12–15.9)
IMM GRANULOCYTES # BLD AUTO: 0.02 10*3/MM3 (ref 0–0.05)
IMM GRANULOCYTES NFR BLD AUTO: 0.3 % (ref 0–0.5)
LDLC SERPL CALC-MCNC: 141 MG/DL (ref 0–100)
LDLC/HDLC SERPL: 2.56 {RATIO}
LYMPHOCYTES # BLD AUTO: 2.22 10*3/MM3 (ref 0.7–3.1)
LYMPHOCYTES NFR BLD AUTO: 29.2 % (ref 19.6–45.3)
MAGNESIUM SERPL-MCNC: 2.4 MG/DL (ref 1.6–2.6)
MCH RBC QN AUTO: 30.7 PG (ref 26.6–33)
MCHC RBC AUTO-ENTMCNC: 33.7 G/DL (ref 31.5–35.7)
MCV RBC AUTO: 91 FL (ref 79–97)
MONOCYTES # BLD AUTO: 1.17 10*3/MM3 (ref 0.1–0.9)
MONOCYTES NFR BLD AUTO: 15.4 % (ref 5–12)
NEUTROPHILS NFR BLD AUTO: 4.06 10*3/MM3 (ref 1.7–7)
NEUTROPHILS NFR BLD AUTO: 53.5 % (ref 42.7–76)
NRBC BLD AUTO-RTO: 0 /100 WBC (ref 0–0.2)
PHOSPHATE SERPL-MCNC: 4.7 MG/DL (ref 2.5–4.5)
PLATELET # BLD AUTO: 391 10*3/MM3 (ref 140–450)
PMV BLD AUTO: 9.7 FL (ref 6–12)
POTASSIUM SERPL-SCNC: 5 MMOL/L (ref 3.5–5.2)
PROT SERPL-MCNC: 7.9 G/DL (ref 6–8.5)
RBC # BLD AUTO: 5.12 10*6/MM3 (ref 3.77–5.28)
SODIUM SERPL-SCNC: 137 MMOL/L (ref 136–145)
TRIGL SERPL-MCNC: 149 MG/DL (ref 0–150)
TSH SERPL DL<=0.05 MIU/L-ACNC: 1.16 UIU/ML (ref 0.27–4.2)
VLDLC SERPL-MCNC: 27 MG/DL (ref 5–40)
WBC # BLD AUTO: 7.59 10*3/MM3 (ref 3.4–10.8)

## 2021-10-28 PROCEDURE — 80053 COMPREHEN METABOLIC PANEL: CPT | Performed by: NURSE PRACTITIONER

## 2021-10-28 PROCEDURE — 84100 ASSAY OF PHOSPHORUS: CPT | Performed by: NURSE PRACTITIONER

## 2021-10-28 PROCEDURE — 99204 OFFICE O/P NEW MOD 45 MIN: CPT | Performed by: NURSE PRACTITIONER

## 2021-10-28 PROCEDURE — 83735 ASSAY OF MAGNESIUM: CPT | Performed by: NURSE PRACTITIONER

## 2021-10-28 PROCEDURE — 84443 ASSAY THYROID STIM HORMONE: CPT | Performed by: NURSE PRACTITIONER

## 2021-10-28 PROCEDURE — 85025 COMPLETE CBC W/AUTO DIFF WBC: CPT | Performed by: NURSE PRACTITIONER

## 2021-10-28 PROCEDURE — 93000 ELECTROCARDIOGRAM COMPLETE: CPT | Performed by: NURSE PRACTITIONER

## 2021-10-28 PROCEDURE — 80061 LIPID PANEL: CPT | Performed by: NURSE PRACTITIONER
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
## 2021-10-30 NOTE — ASSESSMENT & PLAN NOTE
Patient's depression is recurrent and is mild without psychosis. Their depression is currently active and the condition is improving with lifestyle modifications. This will be reassessed in 4 weeks. F/U as described:Patient will continue with lifestyle modifications for her depression..

## 2021-11-01 ENCOUNTER — OFFICE VISIT (OUTPATIENT)
Dept: GASTROENTEROLOGY | Facility: CLINIC | Age: 36
End: 2021-11-01

## 2021-11-01 VITALS
BODY MASS INDEX: 17.99 KG/M2 | SYSTOLIC BLOOD PRESSURE: 114 MMHG | OXYGEN SATURATION: 100 % | DIASTOLIC BLOOD PRESSURE: 62 MMHG | HEIGHT: 68 IN | WEIGHT: 118.7 LBS | HEART RATE: 78 BPM

## 2021-11-01 DIAGNOSIS — R10.9 ABDOMINAL SPASMS: ICD-10-CM

## 2021-11-01 DIAGNOSIS — R13.12 OROPHARYNGEAL DYSPHAGIA: ICD-10-CM

## 2021-11-01 DIAGNOSIS — K21.9 GASTROESOPHAGEAL REFLUX DISEASE WITHOUT ESOPHAGITIS: Primary | ICD-10-CM

## 2021-11-01 DIAGNOSIS — K58.0 IRRITABLE BOWEL SYNDROME WITH DIARRHEA: ICD-10-CM

## 2021-11-01 PROCEDURE — 99214 OFFICE O/P EST MOD 30 MIN: CPT | Performed by: NURSE PRACTITIONER

## 2021-11-01 RX ORDER — HYOSCYAMINE SULFATE 0.125 MG
0.12 TABLET ORAL EVERY 4 HOURS PRN
Qty: 90 TABLET | Refills: 3 | Status: SHIPPED | OUTPATIENT
Start: 2021-11-01 | End: 2022-02-03

## 2021-11-01 NOTE — PATIENT INSTRUCTIONS
"Bernard's Neurology in Clinical Practice (8th ed., pp. 152-163). [place]: Elsevier.\"> [employer] Textbook of Surgery (21st ed., pp. 0142-0782). DAFNE Darnell: [employer].\">   Dysphagia    Dysphagia is trouble swallowing. This condition occurs when solids and liquids stick in a person's throat on the way down to the stomach, or when food takes longer to get to the stomach than usual.  You may have problems swallowing food, liquids, or both. You may also have pain while trying to swallow. It may take you more time and effort to swallow something.  What are the causes?  This condition may be caused by:  · Muscle problems. They may make it difficult for you to move food and liquids through the esophagus, which is the tube that connects your mouth to your stomach.  · Blockages. You may have ulcers, scar tissue, or inflammation that blocks the normal passage of food and liquids. Causes of these problems include:  ? Acid reflux from your stomach into your esophagus (gastroesophageal reflux).  ? Infections.  ? Radiation treatment for cancer.  ? Medicines taken without enough fluids to wash them down into your stomach.  · Stroke. This can affect the nerves and make it difficult to swallow.  · Nerve problems. These prevent signals from being sent to the muscles of your esophagus to squeeze (contract) and move what you swallow down to your stomach.  · Globus pharyngeus. This is a common problem that involves a feeling like something is stuck in your throat or a sense of trouble with swallowing, even though nothing is wrong with the swallowing passages.  · Certain conditions, such as cerebral palsy or Parkinson's disease.  What are the signs or symptoms?  Common symptoms of this condition include:  · A feeling that solids or liquids are stuck in your throat on the way down to the stomach.  · Pain while swallowing.  · Coughing or gagging while trying to swallow.  Other symptoms include:  · Food moving back from your " stomach to your mouth (regurgitation).  · Noises coming from your throat.  · Chest discomfort with swallowing.  · A feeling of fullness when swallowing.  · Drooling, especially when the throat is blocked.  · Heartburn.  How is this diagnosed?  This condition may be diagnosed by:  · Barium X-ray. In this test, you will swallow a white liquid that sticks to the inside of your esophagus. X-ray images are then taken.  · Endoscopy. In this test, a flexible telescope is inserted down your throat to look at your esophagus and your stomach.  · CT scans and an MRI.  How is this treated?  Treatment for dysphagia depends on the cause of this condition, such as:  · If the dysphagia is caused by acid reflux or infection, medicines may be used. They may include antibiotics and heartburn medicines.  · If the dysphagia is caused by problems with the muscles, swallowing therapy may be used to help you strengthen your swallowing muscles. You may have to do specific exercises to strengthen the muscles or stretch them.  · If the dysphagia is caused by a blockage or mass, procedures to remove the blockage may be done. You may need surgery and a feeding tube.  You may need to make diet changes. Ask your health care provider for specific instructions.  Follow these instructions at home:  Medicines  · Take over-the-counter and prescription medicines only as told by your health care provider.  · If you were prescribed an antibiotic medicine, take it as told by your health care provider. Do not stop taking the antibiotic even if you start to feel better.  Eating and drinking    · Follow any diet changes as told by your health care provider.  · Work with a diet and nutrition specialist (dietitian) to create an eating plan that will help you get the nutrients you need in order to stay healthy.  · Eat soft foods that are easier to swallow.  · Cut your food into small pieces and eat slowly. Take small bites.  · Eat and drink only when you are  sitting upright.  · Do not drink alcohol or caffeine. If you need help quitting, ask your health care provider.    General instructions  · Check your weight every day to make sure you are not losing weight.  · Do not use any products that contain nicotine or tobacco, such as cigarettes, e-cigarettes, and chewing tobacco. If you need help quitting, ask your health care provider.  · Keep all follow-up visits as told by your health care provider. This is important.  Contact a health care provider if you:  · Lose weight because you cannot swallow.  · Cough when you drink liquids.  · Cough up partially digested food.  Get help right away if you:  · Cannot swallow your saliva.  · Have shortness of breath, a fever, or both.  · Have a hoarse voice and also have trouble swallowing.  Summary  · Dysphagia is trouble swallowing. This condition occurs when solids and liquids stick in a person's throat on the way down to the stomach. You may cough or gag while trying to swallow.  · Dysphagia has many possible causes.  · Treatment for dysphagia depends on the cause of the condition.  · Keep all follow-up visits as told by your health care provider. This is important.  This information is not intended to replace advice given to you by your health care provider. Make sure you discuss any questions you have with your health care provider.  Document Revised: 05/13/2020 Document Reviewed: 05/13/2020  [employer] Patient Education © 2021 [employer]

## 2021-11-11 ENCOUNTER — OFFICE VISIT (OUTPATIENT)
Dept: CARDIOLOGY | Facility: CLINIC | Age: 36
End: 2021-11-11

## 2021-11-11 ENCOUNTER — APPOINTMENT (OUTPATIENT)
Dept: CARDIOLOGY | Facility: HOSPITAL | Age: 36
End: 2021-11-11

## 2021-11-11 VITALS
DIASTOLIC BLOOD PRESSURE: 61 MMHG | HEIGHT: 68 IN | WEIGHT: 117 LBS | HEART RATE: 75 BPM | SYSTOLIC BLOOD PRESSURE: 95 MMHG | BODY MASS INDEX: 17.73 KG/M2

## 2021-11-11 DIAGNOSIS — R42 DIZZINESS: ICD-10-CM

## 2021-11-11 DIAGNOSIS — R00.2 PALPITATIONS: Primary | ICD-10-CM

## 2021-11-11 PROCEDURE — 99203 OFFICE O/P NEW LOW 30 MIN: CPT | Performed by: SPECIALIST

## 2021-11-11 NOTE — PROGRESS NOTES
[facility]   Cardiology Consult Note    Patient Name: Seda Simmons  : 1985  Referring Physician: ALDAIR Bey  Subjective   Subjective     Reason for Consult/ Chief Complaint:   Chief Complaint   Patient presents with   • NEW PATIENT     PALPS; LIGHTHEADEDNESS        HPI:  Seda Simmons is a 36 y.o. female has palpitations on and off since childhood.  Palpitations appears very much every day lasted for few minutes relieved spontaneously.  Sometimes associated with dizziness.  No syncopal or presyncopal spell.    Review of Systems:   Constitutional no fever,  no weight loss   Skin no rash   Otolaryngeal no difficulty swallowing   Cardiovascular See HPI   Pulmonary no cough, no sputum production   Gastrointestinal no constipation, no diarrhea   Genitourinary no dysuria, no hematuria   Hematologic no easy bruisability, no abnormal bleeding   Musculoskeletal no muscle pain   Neurologic no dizziness, no falls     Personal History     Past Medical History:  Past Medical History:   Diagnosis Date   • Allergic    • Allergies    • Anemia    • Anxiety    • Arthritis    • Chest pain     on Propranolol   • Colon polyp    • Condition not found 2015    left gluteus medius insufficiency   • Depression    • GERD (gastroesophageal reflux disease)    • Head injury    • Hemorrhoids    • Hemorrhoids    • History of MRSA infection     2010- WOUND COLINIZED   • HL (hearing loss)    • Kidney stone    • Limb swelling    • Migraine headache    • PONV (postoperative nausea and vomiting)     slight nausea   • Shortness of breath    • Spinal headache     post epidural post child birth       Family History:   Family History   Problem Relation Age of Onset   • Heart disease Mother    • Arthritis Mother    • Colon cancer Father    • Cancer Father    • Arthritis Father    • Osteoporosis Father    • Heart disease Father    • Lung cancer Other    • Clotting disorder Other    • Diabetes Other    •  Uterine cancer Other    • Malig Hyperthermia Neg Hx        Social History:  reports that she has been smoking cigarettes. She has a 20.00 pack-year smoking history. She has never used smokeless tobacco. She reports current alcohol use. She reports that she does not use drugs.    Home Medications:  SUMAtriptan, hyoscyamine, and propranolol    Allergies:  Allergies   Allergen Reactions   • Bee Venom Shortness Of Breath and Swelling   • Latex Itching, Swelling and Rash     Swelling at site, rash and itching    • Metronidazole Rash   • Omeprazole Other (See Comments)     Causes thrush.   • Paroxetine Other (See Comments)     Severe migraine        Objective    Objective     Vitals:   Heart Rate:  [75] 75  BP: (95)/(61) 95/61  Body mass index is 17.79 kg/m².  Physical Exam:   Constitutional: Awake, alert, No acute distress    Eyes: PERRLA, sclerae anicteric, no conjunctival injection   HENT: NCAT, mucous membranes moist   Neck: Supple, no thyromegaly, no lymphadenopathy, trachea midline   Respiratory: Clear to auscultation bilaterally, nonlabored respirations    Cardiovascular: RRR, no murmurs or rubs. Palpable pedal pulses bilaterally   Gastrointestinal: Positive bowel sounds, soft, nontender, nondistended   Musculoskeletal: No bilateral ankle edema, no clubbing or cyanosis to extremities   Psychiatric: Appropriate affect, cooperative   Neurologic: Oriented x 3, strength symmetric in all extremities, Cranial Nerves grossly intact to confrontation, speech clear   Skin: No rashes     Result Review    Result Review:  I have personally reviewed the available results:  [x]  Laboratory  [x]  EKG/Telemetry   [x]  Cardiology/Vascular   [x] Medications  [x]  Old records  Lab Results   Component Value Date    CHOL 222 (H) 10/28/2021     Lab Results   Component Value Date    TRIG 149 10/28/2021    TRIG 127 07/28/2020     Lab Results   Component Value Date    HDL 54 10/28/2021    HDL 38 (L) 07/28/2020     Lab Results   Component  Value Date     (H) 10/28/2021     (H) 07/28/2020     Lab Results   Component Value Date    VLDL 27 10/28/2021    VLDL 25 07/28/2020         EKG report reviewed shows sinus rhythm within normal limits.    Procedures     Impression/Plan  1.  Palpitations/dizziness: 24-hour Holter to evaluate any significant arrhythmias.  Echocardiogram to evaluate for mitral valve prolapse.  Lifestyle modification increasing fluid intake was advised.  Low caffeine intake was advised.  Continue propranolol 20 mg three times a day for now.        Electronically signed by Cirilo Chong MD, 11/11/21, 12:59 PM EST.

## 2021-11-16 ENCOUNTER — HOSPITAL ENCOUNTER (OUTPATIENT)
Dept: GENERAL RADIOLOGY | Facility: HOSPITAL | Age: 36
Discharge: HOME | End: 2021-11-16
Admitting: NURSE PRACTITIONER

## 2021-11-16 DIAGNOSIS — K21.9 GASTROESOPHAGEAL REFLUX DISEASE WITHOUT ESOPHAGITIS: ICD-10-CM

## 2021-11-16 DIAGNOSIS — R13.12 OROPHARYNGEAL DYSPHAGIA: ICD-10-CM

## 2021-11-16 PROCEDURE — 74221 X-RAY XM ESOPHAGUS 2CNTRST: CPT

## 2021-11-16 RX ADMIN — BARIUM SULFATE 700 MG: 700 TABLET ORAL at 10:46

## 2021-11-16 RX ADMIN — BARIUM SULFATE 355 ML: 0.6 SUSPENSION ORAL at 10:47

## 2021-11-16 RX ADMIN — ANTACID/ANTIFLATULENT 1 PACKET: 380; 550; 10; 10 GRANULE, EFFERVESCENT ORAL at 10:47

## 2021-11-16 RX ADMIN — BARIUM SULFATE 135 ML: 980 POWDER, FOR SUSPENSION ORAL at 10:46

## 2021-11-17 ENCOUNTER — HOSPITAL ENCOUNTER (OUTPATIENT)
Dept: CARDIOLOGY | Facility: HOSPITAL | Age: 36
Discharge: HOME | End: 2021-11-17
Admitting: NURSE PRACTITIONER

## 2021-11-17 DIAGNOSIS — R00.2 PALPITATIONS: ICD-10-CM

## 2021-11-17 PROCEDURE — 93225 XTRNL ECG REC<48 HRS REC: CPT

## 2021-12-01 ENCOUNTER — TELEPHONE (OUTPATIENT)
Dept: FAMILY MEDICINE CLINIC | Facility: CLINIC | Age: 36
End: 2021-12-01

## 2021-12-01 NOTE — TELEPHONE ENCOUNTER
PATIENT WAS SCHEDULED FOR APPOINTMENT ON 12/01/2021 @ 1:00 WITH MARCELLA KUO. PATIENT CALLED AND STATED THAT FAMILY MEMBER HAS TESTED POSITIVE FOR COVID. RESCHEDULED APPOINTMENT

## 2021-12-02 LAB
MAXIMAL PREDICTED HEART RATE: 184 BPM
STRESS TARGET HR: 156 BPM

## 2022-01-04 ENCOUNTER — TRANSCRIBE ORDERS (OUTPATIENT)
Dept: CASE MANAGEMENT | Facility: OTHER | Age: 37
End: 2022-01-04

## 2022-01-04 DIAGNOSIS — E04.1 THYROID NODULE: ICD-10-CM

## 2022-01-04 DIAGNOSIS — R13.10 DYSPHAGIA, UNSPECIFIED TYPE: Primary | ICD-10-CM

## 2022-01-04 NOTE — PROGRESS NOTES
Chief Complaint  Anxiety, palpitations    Subjective          Seda ELIO Simmons presents to [facility]  History of Present Illness  Presents today for a follow-up.  Holter monitor was within normal limits.  Sinus rhythm.  She is scheduled for an echocardiogram in a couple weeks.  She is to continue propranolol 20 mg 3 times daily.  She also has a history of depression anxiety.  She has been on multiple medications in the past.  She states she has taken Zoloft, Celexa, Paxil, Wellbutrin, Abilify, and Seroquel.  Currently taking propanolol only for her anxiety.  She states the Abilify caused her symptoms to be worse.  Also reports Seroquel caused her to have a hangover.    History of multinodular goiter.  She is scheduled for thyroid ultrasound later this month.    Its has been a couple years since her last Pap.  She will schedule a Pap at her next follow-up visit.    PHQ-9 Depression Screening  Little interest or pleasure in doing things? 1   Feeling down, depressed, or hopeless? 1   Trouble falling or staying asleep, or sleeping too much? 3   Feeling tired or having little energy? 1   Poor appetite or overeating? 3   Feeling bad about yourself - or that you are a failure or have let yourself or your family down? 3   Trouble concentrating on things, such as reading the newspaper or watching television? 1   Moving or speaking so slowly that other people could have noticed? Or the opposite - being so fidgety or restless that you have been moving around a lot more than usual? 3   Thoughts that you would be better off dead, or of hurting yourself in some way? 0   PHQ-9 Total Score 16   If you checked off any problems, how difficult have these problems made it for you to do your work, take care of things at home, or get along with other people? Extremely dIfficult         THIEN - 7 Anxiety    Date data was collected: 1/5/2022    Over the last 2 weeks, how often have you been bothered  "by any of the following problems?    1. Feeling nervous, anxious or on edge: More than half the days = 2   2. Not being able to stop or control worrying Nearly every day = 3   3. Worrying too much about different things: Nearly every day = 3   4. Trouble relaxing: Nearly every day = 3   5. Being so restless that it is hard to sit still: Several Days = 1   6. Becoming easily annoyed or irritable: Nearly every day = 3   7. Feeling afraid as if something awful might happen: Several Days = 1    Total Score 16     If you checked off any problems, how difficult have these problems made it for you to do your work, take care of things at home or get along with other people? Extremely difficult      ______________________________________________________________________  THIEN-7 SCORING CARD FOR SEVERITY DETERMINATION    Scoring -- add up all answers  Not at all =0; Serveral Days = 1; More than half the days - 2; Nearly every day =3      Interpretation of Total Score  Total Score Anxiety Severity   0-5 Mild   6-10 Moderate   11-15 Moderately Severe   15-21 Severe       Objective   Vital Signs:   /70   Pulse 86   Temp 97.4 °F (36.3 °C)   Ht 172.7 cm (68\")   Wt 51.3 kg (113 lb)   SpO2 100%   BMI 17.18 kg/m²     Physical Exam  Vitals reviewed.   Constitutional:       Appearance: Normal appearance. She is well-developed.   HENT:      Head: Normocephalic and atraumatic.      Right Ear: External ear normal.      Left Ear: External ear normal.      Mouth/Throat:      Pharynx: No oropharyngeal exudate.   Eyes:      Conjunctiva/sclera: Conjunctivae normal.      Pupils: Pupils are equal, round, and reactive to light.   Cardiovascular:      Rate and Rhythm: Normal rate and regular rhythm.      Heart sounds: No murmur heard.  No friction rub. No gallop.    Pulmonary:      Effort: Pulmonary effort is normal.      Breath sounds: Normal breath sounds. No wheezing or rhonchi.   Abdominal:      General: Bowel sounds are normal. " There is no distension.      Palpations: Abdomen is soft.      Tenderness: There is no abdominal tenderness.   Skin:     General: Skin is warm and dry.   Neurological:      Mental Status: She is alert and oriented to person, place, and time.   Psychiatric:         Mood and Affect: Mood and affect normal.         Behavior: Behavior normal.         Thought Content: Thought content normal.         Judgment: Judgment normal.        Result Review :     Common labs    Common Labsle 2/10/21 7/6/21 7/6/21 10/28/21 10/28/21 10/28/21     1113 1113 1211 1211 1211   Glucose   84   77   BUN   11   12   Creatinine   0.77   0.68   eGFR Non African Am   85   98   Sodium   142   137   Potassium   4.5   5.0   Chloride   107   101   Calcium   9.0   10.0   Albumin   4.20   5.00   Total Bilirubin   0.5   0.4   Alkaline Phosphatase   62   72   AST (SGOT)   9   16   ALT (SGPT)   <5   9   WBC 4.38 (A) 5.45  7.59     Hemoglobin 14.0 14.9  15.7     Hematocrit 42.9 44.4  46.6     Platelets 369 367  391     Total Cholesterol     222 (A)    Triglycerides     149    HDL Cholesterol     54    LDL Cholesterol      141 (A)    (A) Abnormal value                      Assessment and Plan    Diagnoses and all orders for this visit:    1. Depression, unspecified depression type (Primary)  -     Ambulatory Referral to Psychiatry    2. Anxiety  -     Ambulatory Referral to Psychiatry    3. Palpitations  Assessment & Plan:  Continue propanolol.  Holter monitor was normal.  Scheduled for echocardiogram later this month and a follow-up appointment with cardiology in 1 month.      4. Goiter  Assessment & Plan:  Scheduled for an ultrasound later this month      Other orders  -     propranolol (INDERAL) 20 MG tablet; Take 1 tablet by mouth 3 (Three) Times a Day.  Dispense: 90 tablet; Refill: 5  Continue the propranolol at this time to target palpitations and anxiety.  Since she has been on several antidepressants, antianxiety, and antipsychotics; will consult  psychiatry for further evaluation and treatment.      Follow Up   Return in about 3 months (around 4/5/2022), or if symptoms worsen or fail to improve, for Next scheduled follow up.  Patient was given instructions and counseling regarding her condition or for health maintenance advice. Please see specific information pulled into the AVS if appropriate.

## 2022-01-05 ENCOUNTER — OFFICE VISIT (OUTPATIENT)
Dept: FAMILY MEDICINE CLINIC | Facility: CLINIC | Age: 37
End: 2022-01-05

## 2022-01-05 VITALS
OXYGEN SATURATION: 100 % | WEIGHT: 113 LBS | HEIGHT: 68 IN | DIASTOLIC BLOOD PRESSURE: 70 MMHG | SYSTOLIC BLOOD PRESSURE: 116 MMHG | HEART RATE: 86 BPM | TEMPERATURE: 97.4 F | BODY MASS INDEX: 17.13 KG/M2

## 2022-01-05 DIAGNOSIS — R00.2 PALPITATIONS: ICD-10-CM

## 2022-01-05 DIAGNOSIS — F41.9 ANXIETY: ICD-10-CM

## 2022-01-05 DIAGNOSIS — E04.9 GOITER: ICD-10-CM

## 2022-01-05 DIAGNOSIS — F32.A DEPRESSION, UNSPECIFIED DEPRESSION TYPE: Primary | ICD-10-CM

## 2022-01-05 PROCEDURE — 99214 OFFICE O/P EST MOD 30 MIN: CPT | Performed by: NURSE PRACTITIONER

## 2022-01-05 RX ORDER — PROPRANOLOL HYDROCHLORIDE 20 MG/1
20 TABLET ORAL 3 TIMES DAILY
Qty: 90 TABLET | Refills: 5 | Status: SHIPPED | OUTPATIENT
Start: 2022-01-05 | End: 2023-02-14

## 2022-01-05 NOTE — ASSESSMENT & PLAN NOTE
Continue propanolol.  Holter monitor was normal.  Scheduled for echocardiogram later this month and a follow-up appointment with cardiology in 1 month.

## 2022-01-26 ENCOUNTER — LAB (OUTPATIENT)
Dept: LAB | Facility: HOSPITAL | Age: 37
End: 2022-01-26

## 2022-01-26 ENCOUNTER — TRANSCRIBE ORDERS (OUTPATIENT)
Dept: LAB | Facility: HOSPITAL | Age: 37
End: 2022-01-26

## 2022-01-26 DIAGNOSIS — E03.9 HYPOTHYROIDISM, ADULT: ICD-10-CM

## 2022-01-26 DIAGNOSIS — E03.9 HYPOTHYROIDISM, ADULT: Primary | ICD-10-CM

## 2022-01-26 LAB
T4 FREE SERPL-MCNC: 1.2 NG/DL (ref 0.93–1.7)
TSH SERPL DL<=0.05 MIU/L-ACNC: 1.12 UIU/ML (ref 0.27–4.2)

## 2022-01-26 PROCEDURE — 84432 ASSAY OF THYROGLOBULIN: CPT

## 2022-01-26 PROCEDURE — 36415 COLL VENOUS BLD VENIPUNCTURE: CPT

## 2022-01-26 PROCEDURE — 86376 MICROSOMAL ANTIBODY EACH: CPT

## 2022-01-26 PROCEDURE — 84443 ASSAY THYROID STIM HORMONE: CPT

## 2022-01-26 PROCEDURE — 84439 ASSAY OF FREE THYROXINE: CPT

## 2022-01-27 ENCOUNTER — HOSPITAL ENCOUNTER (OUTPATIENT)
Dept: ULTRASOUND IMAGING | Facility: HOSPITAL | Age: 37
Discharge: HOME | End: 2022-01-27
Admitting: NURSE PRACTITIONER

## 2022-01-27 DIAGNOSIS — R13.10 DYSPHAGIA, UNSPECIFIED TYPE: ICD-10-CM

## 2022-01-27 DIAGNOSIS — E04.1 THYROID NODULE: ICD-10-CM

## 2022-01-27 LAB — THYROPEROXIDASE AB SERPL-ACNC: <8 IU/ML (ref 0–34)

## 2022-01-27 PROCEDURE — 76536 US EXAM OF HEAD AND NECK: CPT

## 2022-02-02 LAB — THYROGLOB SERPL-MCNC: 43 NG/ML

## 2022-02-03 RX ORDER — HYOSCYAMINE SULFATE 0.125 MG
TABLET ORAL
Qty: 90 TABLET | Refills: 3 | Status: SHIPPED | OUTPATIENT
Start: 2022-02-03 | End: 2022-05-02 | Stop reason: SDUPTHER

## 2022-02-08 ENCOUNTER — OFFICE VISIT (OUTPATIENT)
Dept: CARDIOLOGY | Facility: CLINIC | Age: 37
End: 2022-02-08

## 2022-02-08 VITALS
SYSTOLIC BLOOD PRESSURE: 108 MMHG | BODY MASS INDEX: 18.19 KG/M2 | DIASTOLIC BLOOD PRESSURE: 70 MMHG | WEIGHT: 120 LBS | HEIGHT: 68 IN | HEART RATE: 79 BPM

## 2022-02-08 DIAGNOSIS — Z72.0 TOBACCO USE: ICD-10-CM

## 2022-02-08 DIAGNOSIS — R00.2 PALPITATIONS: Primary | ICD-10-CM

## 2022-02-08 DIAGNOSIS — I34.0 TRACE MITRAL VALVE REGURGITATION: ICD-10-CM

## 2022-02-08 DIAGNOSIS — R42 DIZZINESS: ICD-10-CM

## 2022-02-08 PROBLEM — J01.80 OTHER ACUTE SINUSITIS: Status: RESOLVED | Noted: 2021-07-02 | Resolved: 2022-02-08

## 2022-02-08 PROBLEM — Z78.9 ELECTRONIC CIGARETTE USE: Status: RESOLVED | Noted: 2020-07-28 | Resolved: 2022-02-08

## 2022-02-08 PROBLEM — E05.90 HYPERTHYROIDISM: Status: ACTIVE | Noted: 2021-03-18

## 2022-02-08 PROBLEM — F41.0 PANIC DISORDER WITHOUT AGORAPHOBIA: Status: ACTIVE | Noted: 2020-07-28

## 2022-02-08 PROBLEM — R06.02 SHORTNESS OF BREATH: Status: RESOLVED | Noted: 2021-07-02 | Resolved: 2022-02-08

## 2022-02-08 PROBLEM — M79.89 LIMB SWELLING: Status: RESOLVED | Noted: 2021-07-02 | Resolved: 2022-02-08

## 2022-02-08 PROBLEM — S14.109S: Status: ACTIVE | Noted: 2021-06-23

## 2022-02-08 PROBLEM — F41.9 ANXIETY: Status: RESOLVED | Noted: 2021-07-02 | Resolved: 2022-02-08

## 2022-02-08 PROBLEM — G43.719 INTRACTABLE CHRONIC MIGRAINE WITHOUT AURA AND WITHOUT STATUS MIGRAINOSUS: Status: ACTIVE | Noted: 2022-02-08

## 2022-02-08 PROBLEM — Z78.9 ELECTRONIC CIGARETTE USE: Status: ACTIVE | Noted: 2020-07-28

## 2022-02-08 PROBLEM — Z80.9: Status: RESOLVED | Noted: 2021-07-02 | Resolved: 2022-02-08

## 2022-02-08 PROBLEM — R10.9 ABDOMINAL PAIN: Status: RESOLVED | Noted: 2021-07-02 | Resolved: 2022-02-08

## 2022-02-08 PROBLEM — S09.90XA HEAD INJURY: Status: RESOLVED | Noted: 2021-07-02 | Resolved: 2022-02-08

## 2022-02-08 PROCEDURE — 99214 OFFICE O/P EST MOD 30 MIN: CPT | Performed by: NURSE PRACTITIONER

## 2022-02-08 RX ORDER — RIZATRIPTAN BENZOATE 10 MG/1
10 TABLET ORAL
COMMUNITY
Start: 2022-01-27 | End: 2023-01-10

## 2022-02-08 NOTE — PATIENT INSTRUCTIONS
Palpitations  Palpitations are feelings that your heartbeat is not normal. Your heartbeat may feel like it is:  · Uneven.  · Faster than normal.  · Fluttering.  · Skipping a beat.  This is usually not a serious problem. In some cases, you may need tests to rule out any serious problems.  Follow these instructions at home:  Pay attention to any changes in your condition. Take these actions to help manage your symptoms:  Eating and drinking  · Avoid:  ? Coffee, tea, soft drinks, and energy drinks.  ? Chocolate.  ? Alcohol.  ? Diet pills.  Lifestyle    · Try to lower your stress. These things can help you relax:  ? Yoga.  ? Deep breathing and meditation.  ? Exercise.  ? Using words and images to create positive thoughts (guided imagery).  ? Using your mind to control things in your body (biofeedback).  · Do not use drugs.  · Get plenty of rest and sleep. Keep a regular bed time.    General instructions    · Take over-the-counter and prescription medicines only as told by your doctor.  · Do not use any products that contain nicotine or tobacco, such as cigarettes and e-cigarettes. If you need help quitting, ask your doctor.  · Keep all follow-up visits as told by your doctor. This is important. You may need more tests if palpitations do not go away or get worse.    Contact a doctor if:  · Your symptoms last more than 24 hours.  · Your symptoms occur more often.  Get help right away if you:  · Have chest pain.  · Feel short of breath.  · Have a very bad headache.  · Feel dizzy.  · Pass out (faint).  Summary  · Palpitations are feelings that your heartbeat is uneven or faster than normal. It may feel like your heart is fluttering or skipping a beat.  · Avoid food and drinks that may cause palpitations. These include caffeine, chocolate, and alcohol.  · Try to lower your stress. Do not smoke or use drugs.  · Get help right away if you faint or have chest pain, shortness of breath, a severe headache, or dizziness.  This  information is not intended to replace advice given to you by your health care provider. Make sure you discuss any questions you have with your health care provider.  Document Revised: 01/30/2019 Document Reviewed: 01/30/2019  Elsevier Patient Education © 2021 [employer]

## 2022-02-08 NOTE — PROGRESS NOTES
"Chief Complaint  Results (Echocardiogram) and Shortness of Breath    Subjective            History of Present Illness  Seda Simmons is a 36-year-old white/ female patient who presents to the office today for follow-up.  She has palpitations and dizziness that have been ongoing throughout her life, she states \"since childhood\".  She reports that at the age of 16 she was told all of her symptoms are stemming from \"anxiety\".  On 12/02/21 she had 48-hour Holter monitor that shows predominantly sinus rhythm with a minimum heart rate of 45 and maximum heart rate of 145 bpm.  There were no arrhythmias noted, just an extremely rare isolated premature supraventricular complex.  On 1/25/2022 she had echocardiogram which showed normal left ventricular systolic function, EF 66%, and trace mitral and tricuspid regurgitation.  She denies any recent episodes of passing out.  She reports compliance with all of her medications.  She denies any chest pain, shortness of breath, or edema.    [facility]  Past Medical History:   Diagnosis Date   • Anemia    • Anxiety    • Arthritis    • Colon polyp    • Condition not found 09/04/2015    left gluteus medius insufficiency   • Depression    • Family history of malignant neoplasm in father 7/2/2021    Added automatically from request for surgery [id number]   • GERD (gastroesophageal reflux disease)    • Head injury 7/2/2021   • Hemorrhoids    • History of MRSA infection     2010- WOUND COLINIZED   • HL (hearing loss)    • Kidney stone    • Migraine headache    • PONV (postoperative nausea and vomiting)     slight nausea   • Spinal headache     post epidural post child birth   • Trace mitral valve regurgitation 2/8/2022         ALLERGY  Allergies   Allergen Reactions   • Bee Venom Shortness Of Breath and Swelling   • Latex Itching, Swelling and Rash     Swelling at site, rash and itching    • Metronidazole Rash   • Omeprazole Other (See Comments)     Causes thrush.   • Paroxetine " Other (See Comments)     Severe migraine           SURGICALHX  Past Surgical History:   Procedure Laterality Date   • BONY PELVIS SURGERY     • BREAST AUGMENTATION  01/08/2020   • COLONOSCOPY N/A 9/1/2021    Procedure: COLONOSCOPY;  Surgeon: Haroon Collins MD;  Location: [facility] ENDOSCOPY;  Service: Gastroenterology;  Laterality: N/A;  NORMAL COLONOSCOPY   • DILATION AND CURETTAGE, DIAGNOSTIC / THERAPEUTIC  2004   • ENDOSCOPY  2007 2015   • ENDOSCOPY N/A 9/1/2021    Procedure: ESOPHAGOGASTRODUODENOSCOPY WITH BIOPSY;  Surgeon: Haroon Collins MD;  Location: [facility] ENDOSCOPY;  Service: Gastroenterology;  Laterality: N/A;  HIATAL HERNIA   • FAT GRAFTING  01/08/2020    fat grafting to right breast    • HARDWARE REMOVAL  2011 2014   • HIP ORIF W/ CAPSULOTOMY     • LUNG SURGERY     • OTHER SURGICAL HISTORY      metal implants   • SPLENECTOMY  2010   • UPPER GASTROINTESTINAL ENDOSCOPY     • WRIST SURGERY  2020          SOC  Social History     Socioeconomic History   • Marital status:    Tobacco Use   • Smoking status: Current Every Day Smoker     Packs/day: 1.00     Years: 20.00     Pack years: 20.00     Types: Cigarettes   • Smokeless tobacco: Never Used   • Tobacco comment: 1 ppd or less   Vaping Use   • Vaping Use: Former   • Substances: Nicotine, CBD, Flavoring   • Devices: Pre-filled pod   Substance and Sexual Activity   • Alcohol use: Yes     Comment: occasionally   • Drug use: Never   • Sexual activity: Defer         FAMHX  Family History   Problem Relation Age of Onset   • Heart disease Mother    • Arthritis Mother    • Colon cancer Father    • Cancer Father    • Arthritis Father    • Osteoporosis Father    • Heart disease Father    • Lung cancer Other    • Clotting disorder Other    • Diabetes Other    • Uterine cancer Other    • Malig Hyperthermia Neg Hx           MEDSIGONLY  Current Outpatient Medications on File Prior to Visit   Medication Sig   • hyoscyamine (ANASPAZ,LEVSIN) 0.125 MG  "tablet TAKE 1 TABLET BY MOUTH EVERY 4 HOURS AS NEEDED FOR CRAMPING   • propranolol (INDERAL) 20 MG tablet Take 1 tablet by mouth 3 (Three) Times a Day.   • rizatriptan (MAXALT) 10 MG tablet Take 10 mg by mouth.   • SUMAtriptan (Imitrex) 100 MG tablet Imitrex 100 mg oral tablet take 1 tablet (100 mg) by oral route once with fluids as early as possible after the onset of a migraine attack;may repeat after 2 hours if headache returns, not to exceed 200mg in 24hrs   Active     No current facility-administered medications on file prior to visit.         Objective   /70 (BP Location: Left arm, Patient Position: Sitting)   Pulse 79   Ht 172.7 cm (68\")   Wt 54.4 kg (120 lb)   BMI 18.25 kg/m²       Physical Exam  HENT:      Head: Normocephalic.   Neck:      Vascular: No carotid bruit.   Cardiovascular:      Rate and Rhythm: Normal rate and regular rhythm.      Pulses: Normal pulses.      Heart sounds: Normal heart sounds. No murmur heard.      Pulmonary:      Effort: Pulmonary effort is normal.      Breath sounds: Normal breath sounds.   Musculoskeletal:      Cervical back: Neck supple.      Right lower leg: No edema.      Left lower leg: No edema.   Skin:     General: Skin is dry.      Capillary Refill: Capillary refill takes less than 2 seconds.   Neurological:      Mental Status: She is alert and oriented to person, place, and time.   Psychiatric:         Behavior: Behavior normal.       Result Review :   The following data was reviewed by: ALDAIR Cho on 02/08/2022:  No results found for: PROBNP  CMP    CMP 10/28/21   Glucose 77   BUN 12   Creatinine 0.68   eGFR Non African Am 98   Sodium 137   Potassium 5.0   Chloride 101   Calcium 10.0   Albumin 5.00   Total Bilirubin 0.4   Alkaline Phosphatase 72   AST (SGOT) 16   ALT (SGPT) 9           CBC w/diff    CBC w/Diff 10/28/21   WBC 7.59   RBC 5.12   Hemoglobin 15.7   Hematocrit 46.6   MCV 91.0   MCH 30.7   MCHC 33.7   RDW 12.6   Platelets 391 "   Neutrophil Rel % 53.5   Immature Granulocyte Rel % 0.3   Lymphocyte Rel % 29.2   Monocyte Rel % 15.4 (A)   Eosinophil Rel % 0.8   Basophil Rel % 0.8   (A) Abnormal value             Lab Results   Component Value Date    TSH 1.120 01/26/2022      Lab Results   Component Value Date    FREET4 1.20 01/26/2022      No results found for: DDIMERQUANT  Magnesium   Date Value Ref Range Status   10/28/2021 2.4 1.6 - 2.6 mg/dL Final      No results found for: DIGOXIN   No results found for: TROPONINT        Lipid Panel    Lipid Panel 10/28/21   Total Cholesterol 222 (A)   Triglycerides 149   HDL Cholesterol 54   VLDL Cholesterol 27   LDL Cholesterol  141 (A)   LDL/HDL Ratio 2.56   (A) Abnormal value              Results for orders placed in visit on 01/25/22    Adult Transthoracic Echo Complete W/ Cont if Necessary Per Protocol    Interpretation Summary  Normal left ventricular systolic function.  Trace MR and trace TR.       Assessment and Plan    Diagnoses and all orders for this visit:    1. Palpitations (Primary)  No arrhythmias found on Holter monitor.  She is currently taking propranolol which helps with her migraines and also with palpitations and anxiety.  Obtain tilt table test to evaluate for cardiogenic syncope.  -     Tilt Table; Future    2. Dizziness  Check carotid ultrasound to evaluate for carotid artery stenosis as potential cause for her palpitations and dizziness.  -     Duplex Carotid Ultrasound CAR; Future    3. Trace mitral valve regurgitation  Symptomatically stable at this time, continue to monitor with repeat echocardiogram.    4. Tobacco use  Tobacco abuse cessation counseling provided to patient today but was unable to get patient to commit to cessation plan.          Follow Up   Return in about 1 year (around 2/8/2023) for Follow up with Dr Chong.    Patient was given instructions and counseling regarding her condition or for health maintenance advice. Please see specific information pulled  into the AVS if appropriate.     Seda BALLARD Edvin Simmons  reports that she has been smoking cigarettes. She has a 20.00 pack-year smoking history. She has never used smokeless tobacco.. I have educated her on the risk of diseases from using tobacco products such as cancer, COPD and heart disease.     I advised her to quit and she is not willing to quit.    I spent 4 minutes counseling the patient.           Ada Elizalde, APRN  02/08/22  09:25 EST    Dictated Utilizing Dragon Dictation

## 2022-02-28 ENCOUNTER — TRANSCRIBE ORDERS (OUTPATIENT)
Dept: ADMINISTRATIVE | Facility: HOSPITAL | Age: 37
End: 2022-02-28

## 2022-02-28 DIAGNOSIS — E04.9 GOITER, NODULAR: Primary | ICD-10-CM

## 2022-03-02 NOTE — PATIENT INSTRUCTIONS
1.  Please return to clinic at your next scheduled visit.  Contact the clinic ([phone]) at least 24 hours prior in the event you need to cancel.  2.  Do no harm to yourself or others.    3.  Avoid alcohol and drugs.    4.  Take all medications as prescribed.  Please contact the clinic with any concerns. If you are in need of medication refills, please call the clinic at [phone].    5. Should you want to get in touch with your provider, Dr. Sarita Schultz, please utilize GPal or contact the office ([phone]), and staff will be able to page Dr. Schultz directly.  6.  In the event you have personal crisis, contact the following crisis numbers: Suicide Prevention Hotline 1-680.172.3330; [facility] Helpline 4-758-367-UXAL; [facility] [phone]; text HELLO to 618954; or 706.     SPECIFIC RECOMMENDATIONS:     1.      Medications discussed at this encounter:                   -      2.      Psychotherapy recommendations:      3.     Return to clinic: 6 weeks

## 2022-03-03 ENCOUNTER — OFFICE VISIT (OUTPATIENT)
Dept: PSYCHIATRY | Facility: CLINIC | Age: 37
End: 2022-03-03

## 2022-03-03 VITALS
BODY MASS INDEX: 18.13 KG/M2 | WEIGHT: 119.6 LBS | DIASTOLIC BLOOD PRESSURE: 68 MMHG | HEIGHT: 68 IN | SYSTOLIC BLOOD PRESSURE: 124 MMHG

## 2022-03-03 DIAGNOSIS — F41.0 PANIC ATTACKS: ICD-10-CM

## 2022-03-03 DIAGNOSIS — F43.10 POST TRAUMATIC STRESS DISORDER (PTSD): ICD-10-CM

## 2022-03-03 DIAGNOSIS — S06.9X9D TRAUMATIC BRAIN INJURY WITH LOSS OF CONSCIOUSNESS, SUBSEQUENT ENCOUNTER: ICD-10-CM

## 2022-03-03 DIAGNOSIS — F41.1 GENERALIZED ANXIETY DISORDER: ICD-10-CM

## 2022-03-03 DIAGNOSIS — F90.0 ADHD (ATTENTION DEFICIT HYPERACTIVITY DISORDER), INATTENTIVE TYPE: ICD-10-CM

## 2022-03-03 DIAGNOSIS — F33.1 MAJOR DEPRESSIVE DISORDER, RECURRENT EPISODE, MODERATE: Primary | ICD-10-CM

## 2022-03-03 DIAGNOSIS — F51.05 INSOMNIA DUE TO MENTAL CONDITION: ICD-10-CM

## 2022-03-03 PROCEDURE — 90792 PSYCH DIAG EVAL W/MED SRVCS: CPT | Performed by: STUDENT IN AN ORGANIZED HEALTH CARE EDUCATION/TRAINING PROGRAM

## 2022-03-03 RX ORDER — DIVALPROEX SODIUM 250 MG/1
250 TABLET, EXTENDED RELEASE ORAL DAILY
Qty: 30 TABLET | Refills: 2 | Status: SHIPPED | OUTPATIENT
Start: 2022-03-03 | End: 2022-05-16

## 2022-03-28 ENCOUNTER — APPOINTMENT (OUTPATIENT)
Dept: ULTRASOUND IMAGING | Facility: HOSPITAL | Age: 37
End: 2022-03-28

## 2022-03-31 ENCOUNTER — HOSPITAL ENCOUNTER (OUTPATIENT)
Dept: CARDIOLOGY | Facility: HOSPITAL | Age: 37
Discharge: HOME | End: 2022-03-31
Admitting: NURSE PRACTITIONER

## 2022-03-31 DIAGNOSIS — R00.2 PALPITATIONS: ICD-10-CM

## 2022-03-31 DIAGNOSIS — R42 DIZZINESS: ICD-10-CM

## 2022-03-31 PROCEDURE — 93660 TILT TABLE EVALUATION: CPT | Performed by: INTERNAL MEDICINE

## 2022-03-31 PROCEDURE — 93660 TILT TABLE EVALUATION: CPT

## 2022-04-01 LAB
MAXIMAL PREDICTED HEART RATE: 183 BPM
STRESS TARGET HR: 156 BPM

## 2022-04-04 ENCOUNTER — TELEPHONE (OUTPATIENT)
Dept: CARDIOLOGY | Facility: CLINIC | Age: 37
End: 2022-04-04

## 2022-04-04 NOTE — TELEPHONE ENCOUNTER
----- Message from ALDAIR Judge sent at 4/1/2022 10:25 PM EDT -----  Notify pt tilt table test is normal

## 2022-04-05 ENCOUNTER — OFFICE VISIT (OUTPATIENT)
Dept: FAMILY MEDICINE CLINIC | Facility: CLINIC | Age: 37
End: 2022-04-05

## 2022-04-05 VITALS
SYSTOLIC BLOOD PRESSURE: 110 MMHG | HEIGHT: 68 IN | BODY MASS INDEX: 18.91 KG/M2 | DIASTOLIC BLOOD PRESSURE: 80 MMHG | OXYGEN SATURATION: 97 % | HEART RATE: 71 BPM | WEIGHT: 124.8 LBS | TEMPERATURE: 97.6 F

## 2022-04-05 DIAGNOSIS — R23.3 PETECHIAE: ICD-10-CM

## 2022-04-05 DIAGNOSIS — R53.83 FATIGUE, UNSPECIFIED TYPE: Primary | ICD-10-CM

## 2022-04-05 DIAGNOSIS — E83.39 SERUM PHOSPHATE ELEVATED: ICD-10-CM

## 2022-04-05 LAB
BURR CELLS BLD QL SMEAR: ABNORMAL
DEPRECATED RDW RBC AUTO: 45.4 FL (ref 37–54)
EOSINOPHIL # BLD MANUAL: 0.24 10*3/MM3 (ref 0–0.4)
EOSINOPHIL NFR BLD MANUAL: 5.2 % (ref 0.3–6.2)
ERYTHROCYTE [DISTWIDTH] IN BLOOD BY AUTOMATED COUNT: 13.3 % (ref 12.3–15.4)
HCT VFR BLD AUTO: 42.1 % (ref 34–46.6)
HGB BLD-MCNC: 13.7 G/DL (ref 12–15.9)
LYMPHOCYTES # BLD MANUAL: 2.17 10*3/MM3 (ref 0.7–3.1)
LYMPHOCYTES NFR BLD MANUAL: 17.7 % (ref 5–12)
MCH RBC QN AUTO: 30 PG (ref 26.6–33)
MCHC RBC AUTO-ENTMCNC: 32.5 G/DL (ref 31.5–35.7)
MCV RBC AUTO: 92.1 FL (ref 79–97)
MONOCYTES # BLD: 0.8 10*3/MM3 (ref 0.1–0.9)
NEUTROPHILS # BLD AUTO: 1.32 10*3/MM3 (ref 1.7–7)
NEUTROPHILS NFR BLD MANUAL: 29.2 % (ref 42.7–76)
PLAT MORPH BLD: NORMAL
PLATELET # BLD AUTO: 440 10*3/MM3 (ref 140–450)
PMV BLD AUTO: 9.6 FL (ref 6–12)
POIKILOCYTOSIS BLD QL SMEAR: ABNORMAL
RBC # BLD AUTO: 4.57 10*6/MM3 (ref 3.77–5.28)
VARIANT LYMPHS NFR BLD MANUAL: 47.9 % (ref 19.6–45.3)
WBC MORPH BLD: NORMAL
WBC NRBC COR # BLD: 4.53 10*3/MM3 (ref 3.4–10.8)

## 2022-04-05 PROCEDURE — 99213 OFFICE O/P EST LOW 20 MIN: CPT | Performed by: NURSE PRACTITIONER

## 2022-04-05 PROCEDURE — 83540 ASSAY OF IRON: CPT | Performed by: NURSE PRACTITIONER

## 2022-04-05 PROCEDURE — 82607 VITAMIN B-12: CPT | Performed by: NURSE PRACTITIONER

## 2022-04-05 PROCEDURE — 82728 ASSAY OF FERRITIN: CPT | Performed by: NURSE PRACTITIONER

## 2022-04-05 PROCEDURE — 85007 BL SMEAR W/DIFF WBC COUNT: CPT | Performed by: NURSE PRACTITIONER

## 2022-04-05 PROCEDURE — 84443 ASSAY THYROID STIM HORMONE: CPT | Performed by: NURSE PRACTITIONER

## 2022-04-05 PROCEDURE — 80053 COMPREHEN METABOLIC PANEL: CPT | Performed by: NURSE PRACTITIONER

## 2022-04-05 PROCEDURE — 85027 COMPLETE CBC AUTOMATED: CPT | Performed by: NURSE PRACTITIONER

## 2022-04-05 PROCEDURE — 84100 ASSAY OF PHOSPHORUS: CPT | Performed by: NURSE PRACTITIONER

## 2022-04-05 PROCEDURE — 82306 VITAMIN D 25 HYDROXY: CPT | Performed by: NURSE PRACTITIONER

## 2022-04-05 PROCEDURE — 84466 ASSAY OF TRANSFERRIN: CPT | Performed by: NURSE PRACTITIONER

## 2022-04-05 PROCEDURE — 84479 ASSAY OF THYROID (T3 OR T4): CPT | Performed by: NURSE PRACTITIONER

## 2022-04-05 PROCEDURE — 82746 ASSAY OF FOLIC ACID SERUM: CPT | Performed by: NURSE PRACTITIONER

## 2022-04-05 PROCEDURE — 84436 ASSAY OF TOTAL THYROXINE: CPT | Performed by: NURSE PRACTITIONER

## 2022-04-05 NOTE — PROGRESS NOTES
"Chief Complaint  Petechiae on arm after table tilt test, more rachael feelings since starting Depakote, fatigue    Subjective          Seda ELIO Simmons presents to [facility]  History of Present Illness  Presents today for a follow-up.  Recently had table tilt test.  During the table tilt test she states that her arm below the blood pressure cuff developed petechia.  She states she has had some random petechia on her body.  Denies any blood loss or bruising.  She was recently started on Depakote for her depression.  She states the medication has helped her depression, improved her migraine frequency.  States she feels more disconnected and has more of an Rachael feeling at times.  States she constantly feels fatigue.  She has a follow-up appointment with psychiatry next week.  History of hyperthyroidism.  She has thyroid nodule.    Objective   Vital Signs:   /80   Pulse 71   Temp 97.6 °F (36.4 °C)   Ht 172.7 cm (68\")   Wt 56.6 kg (124 lb 12.8 oz)   SpO2 97%   BMI 18.98 kg/m²     BMI is within normal parameters. No follow-up required.      Physical Exam  Vitals reviewed.   Constitutional:       Appearance: Normal appearance. She is well-developed.   HENT:      Head: Normocephalic and atraumatic.      Right Ear: External ear normal.      Left Ear: External ear normal.      Mouth/Throat:      Pharynx: No oropharyngeal exudate.   Eyes:      Conjunctiva/sclera: Conjunctivae normal.      Pupils: Pupils are equal, round, and reactive to light.   Cardiovascular:      Rate and Rhythm: Normal rate and regular rhythm.      Heart sounds: No murmur heard.    No friction rub. No gallop.   Pulmonary:      Effort: Pulmonary effort is normal.      Breath sounds: Normal breath sounds. No wheezing or rhonchi.   Abdominal:      General: Bowel sounds are normal. There is no distension.      Palpations: Abdomen is soft.      Tenderness: There is no abdominal tenderness.   Skin:     " General: Skin is warm and dry.   Neurological:      Mental Status: She is alert and oriented to person, place, and time.      Cranial Nerves: No cranial nerve deficit.   Psychiatric:         Mood and Affect: Mood and affect normal.         Behavior: Behavior normal.         Thought Content: Thought content normal.         Judgment: Judgment normal.        Result Review :     Common labs    Common Labsle 7/6/21 7/6/21 10/28/21 10/28/21 10/28/21    1113 1113 1211 1211 1211   Glucose  84   77   BUN  11   12   Creatinine  0.77   0.68   eGFR Non African Am  85   98   Sodium  142   137   Potassium  4.5   5.0   Chloride  107   101   Calcium  9.0   10.0   Albumin  4.20   5.00   Total Bilirubin  0.5   0.4   Alkaline Phosphatase  62   72   AST (SGOT)  9   16   ALT (SGPT)  <5   9   WBC 5.45  7.59     Hemoglobin 14.9  15.7     Hematocrit 44.4  46.6     Platelets 367  391     Total Cholesterol    222 (A)    Triglycerides    149    HDL Cholesterol    54    LDL Cholesterol     141 (A)    (A) Abnormal value                      Assessment and Plan    Diagnoses and all orders for this visit:    1. Fatigue, unspecified type (Primary)  -     Ferritin  -     Iron Profile  -     Vitamin B12 & Folate  -     Vitamin D 25 Hydroxy  -     Comprehensive Metabolic Panel  -     CBC With Manual Differential  -     Thyroid Panel With TSH    2. Petechiae  -     Ferritin  -     Iron Profile  -     CBC With Manual Differential    3. Serum phosphate elevated  -     Phosphorus    Will check the following labs for fatigue petechia and elevated phosphorus.  She has a follow-up appointment next week with psychiatry in regards to her Depakote.      Follow Up   Return in about 3 months (around 7/5/2022), or if symptoms worsen or fail to improve, for Next scheduled follow up.  Patient was given instructions and counseling regarding her condition or for health maintenance advice. Please see specific information pulled into the AVS if appropriate.

## 2022-04-06 DIAGNOSIS — E55.9 VITAMIN D DEFICIENCY: Primary | ICD-10-CM

## 2022-04-06 LAB
25(OH)D3 SERPL-MCNC: 16.6 NG/ML (ref 30–100)
ALBUMIN SERPL-MCNC: 4.4 G/DL (ref 3.5–5.2)
ALBUMIN/GLOB SERPL: 1.7 G/DL
ALP SERPL-CCNC: 68 U/L (ref 39–117)
ALT SERPL W P-5'-P-CCNC: 13 U/L (ref 1–33)
ANION GAP SERPL CALCULATED.3IONS-SCNC: 9 MMOL/L (ref 5–15)
AST SERPL-CCNC: 15 U/L (ref 1–32)
BILIRUB SERPL-MCNC: 0.4 MG/DL (ref 0–1.2)
BUN SERPL-MCNC: 10 MG/DL (ref 6–20)
BUN/CREAT SERPL: 14.7 (ref 7–25)
CALCIUM SPEC-SCNC: 9 MG/DL (ref 8.6–10.5)
CHLORIDE SERPL-SCNC: 104 MMOL/L (ref 98–107)
CO2 SERPL-SCNC: 26 MMOL/L (ref 22–29)
CREAT SERPL-MCNC: 0.68 MG/DL (ref 0.57–1)
EGFRCR SERPLBLD CKD-EPI 2021: 115.2 ML/MIN/1.73
FERRITIN SERPL-MCNC: 23.6 NG/ML (ref 13–150)
FOLATE SERPL-MCNC: 7.2 NG/ML (ref 4.78–24.2)
GLOBULIN UR ELPH-MCNC: 2.6 GM/DL
GLUCOSE SERPL-MCNC: 76 MG/DL (ref 65–99)
IRON 24H UR-MRATE: 91 MCG/DL (ref 37–145)
IRON SATN MFR SERPL: 21 % (ref 20–50)
PHOSPHATE SERPL-MCNC: 3.8 MG/DL (ref 2.5–4.5)
POTASSIUM SERPL-SCNC: 4.8 MMOL/L (ref 3.5–5.2)
PROT SERPL-MCNC: 7 G/DL (ref 6–8.5)
SODIUM SERPL-SCNC: 139 MMOL/L (ref 136–145)
T-UPTAKE NFR SERPL: 1.07 TBI (ref 0.8–1.3)
T4 SERPL-MCNC: 6 MCG/DL (ref 4.5–11.7)
TIBC SERPL-MCNC: 441 MCG/DL (ref 298–536)
TRANSFERRIN SERPL-MCNC: 296 MG/DL (ref 200–360)
TSH SERPL DL<=0.05 MIU/L-ACNC: 1.81 UIU/ML (ref 0.27–4.2)
VIT B12 BLD-MCNC: 454 PG/ML (ref 211–946)

## 2022-04-06 RX ORDER — ERGOCALCIFEROL 1.25 MG/1
50000 CAPSULE ORAL WEEKLY
Qty: 13 CAPSULE | Refills: 1 | Status: SHIPPED | OUTPATIENT
Start: 2022-04-06 | End: 2022-09-28

## 2022-04-07 DIAGNOSIS — G43.719 INTRACTABLE CHRONIC MIGRAINE WITHOUT AURA AND WITHOUT STATUS MIGRAINOSUS: Primary | ICD-10-CM

## 2022-04-28 ENCOUNTER — TELEMEDICINE (OUTPATIENT)
Dept: PSYCHIATRY | Facility: CLINIC | Age: 37
End: 2022-04-28

## 2022-04-28 DIAGNOSIS — F90.0 ADHD (ATTENTION DEFICIT HYPERACTIVITY DISORDER), INATTENTIVE TYPE: Primary | ICD-10-CM

## 2022-04-28 DIAGNOSIS — F41.1 GENERALIZED ANXIETY DISORDER: ICD-10-CM

## 2022-04-28 DIAGNOSIS — S06.9X9D TRAUMATIC BRAIN INJURY WITH LOSS OF CONSCIOUSNESS, SUBSEQUENT ENCOUNTER: ICD-10-CM

## 2022-04-28 DIAGNOSIS — F43.10 POST TRAUMATIC STRESS DISORDER (PTSD): ICD-10-CM

## 2022-04-28 DIAGNOSIS — F33.1 MAJOR DEPRESSIVE DISORDER, RECURRENT EPISODE, MODERATE: ICD-10-CM

## 2022-04-28 DIAGNOSIS — F51.05 INSOMNIA DUE TO MENTAL CONDITION: ICD-10-CM

## 2022-04-28 DIAGNOSIS — F41.0 PANIC ATTACKS: ICD-10-CM

## 2022-04-28 PROCEDURE — 99214 OFFICE O/P EST MOD 30 MIN: CPT | Performed by: STUDENT IN AN ORGANIZED HEALTH CARE EDUCATION/TRAINING PROGRAM

## 2022-04-28 RX ORDER — BUPROPION HYDROCHLORIDE 150 MG/1
150 TABLET ORAL EVERY MORNING
Qty: 30 TABLET | Refills: 2 | Status: SHIPPED | OUTPATIENT
Start: 2022-04-28 | End: 2022-06-16

## 2022-04-28 NOTE — PROGRESS NOTES
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 continue with current treatment plan and was educated on the importance of compliance with treatment and follow-up appointments.    MEDICATION ISSUES:  MAURA reviewed as expected.  Discussed medication options and treatment plan of prescribed medication as well as the risks, benefits, and side effects including potential falls, possible impaired driving and metabolic adversities among others. Patient is agreeable to call the office with any worsening of symptoms or onset of side effects. Patient is agreeable to call 911 or go to the nearest ER should he/she begin having SI/HI. No medication side effects or related complaints today.     MEDS ORDERED DURING VISIT:  New Medications Ordered This Visit   Medications   • buPROPion XL (Wellbutrin XL) 150 MG 24 hr tablet     Sig: Take 1 tablet by mouth Every Morning.     Dispense:  30 tablet     Refill:  2       Return in about 7 weeks (around 6/16/2022).         This document has been electronically signed by Sarita Schultz MD  April 28, 2022 11:24 EDT      Part of this note may be an electronic transcription/translation of spoken language to printed text using the Dragon Dictation System.

## 2022-04-28 NOTE — PATIENT INSTRUCTIONS
1.  Please return to clinic at your next scheduled visit.  Contact the clinic ([phone]) at least 24 hours prior in the event you need to cancel.  2.  Do no harm to yourself or others.    3.  Avoid alcohol and drugs.    4.  Take all medications as prescribed.  Please contact the clinic with any concerns. If you are in need of medication refills, please call the clinic at [phone].    5. Should you want to get in touch with your provider, Dr. Sarita Schultz, please utilize [employer] or contact the office ([phone]), and staff will be able to page Dr. Schultz directly.  6.  In the event you have personal crisis, contact the following crisis numbers: Suicide Prevention Hotline 1-478.754.3427; [facility] Helpline 8-275-381-HLBJ; [facility] Emergency Room [phone]; text HELLO to 696804; or 273.     SPECIFIC RECOMMENDATIONS:     1.      Medications discussed at this encounter:                   -Start bupropion     2.      Psychotherapy recommendations:      3.     Return to clinic: 7 weeks

## 2022-05-02 ENCOUNTER — OFFICE VISIT (OUTPATIENT)
Dept: GASTROENTEROLOGY | Facility: CLINIC | Age: 37
End: 2022-05-02

## 2022-05-02 VITALS
HEART RATE: 77 BPM | SYSTOLIC BLOOD PRESSURE: 98 MMHG | WEIGHT: 121.3 LBS | DIASTOLIC BLOOD PRESSURE: 63 MMHG | HEIGHT: 68 IN | OXYGEN SATURATION: 100 % | BODY MASS INDEX: 18.38 KG/M2

## 2022-05-02 DIAGNOSIS — R10.9 ABDOMINAL SPASMS: ICD-10-CM

## 2022-05-02 DIAGNOSIS — R13.12 OROPHARYNGEAL DYSPHAGIA: ICD-10-CM

## 2022-05-02 DIAGNOSIS — Z80.9: ICD-10-CM

## 2022-05-02 DIAGNOSIS — K21.9 GASTROESOPHAGEAL REFLUX DISEASE WITHOUT ESOPHAGITIS: Primary | ICD-10-CM

## 2022-05-02 DIAGNOSIS — K58.0 IRRITABLE BOWEL SYNDROME WITH DIARRHEA: ICD-10-CM

## 2022-05-02 PROCEDURE — 99214 OFFICE O/P EST MOD 30 MIN: CPT | Performed by: NURSE PRACTITIONER

## 2022-05-02 RX ORDER — HYOSCYAMINE SULFATE 0.125 MG
0.12 TABLET ORAL EVERY 6 HOURS PRN
Qty: 90 TABLET | Refills: 6 | Status: SHIPPED | OUTPATIENT
Start: 2022-05-02

## 2022-05-02 NOTE — PATIENT INSTRUCTIONS
"Bernard's Neurology in Clinical Practice (8th ed., pp. 152-163). [place]: Elsevier.\"> Quinn Textbook of Surgery (21st ed., pp. 9282-3759). [place]: Elsevier[employer] Inc.\">   Dysphagia    Dysphagia is trouble swallowing. This condition occurs when solids and liquids stick in a person's throat on the way down to the stomach, or when food takes longer to get to the stomach than usual.  You may have problems swallowing food, liquids, or both. You may also have pain while trying to swallow. It may take you more time and effort to swallow something.  What are the causes?  This condition may be caused by:  Muscle problems. These may make it difficult for you to move food and liquids through the esophagus, which is the tube that connects your mouth to your stomach.  Blockages. You may have ulcers, scar tissue, or inflammation that blocks the normal passage of food and liquids. Causes of these problems include:  Acid reflux from your stomach into your esophagus (gastroesophageal reflux).  Infections.  Radiation treatment for cancer.  Medicines taken without enough fluids to wash them down into your stomach.  Stroke. This can affect the nerves and make it difficult to swallow.  Nerve problems. These prevent signals from being sent to the muscles of your esophagus to squeeze (contract) and move what you swallow down to your stomach.  Globus pharyngeus. This is a common problem that involves a feeling like something is stuck in your throat or a sense of trouble with swallowing, even though nothing is wrong with the swallowing passages.  Certain conditions, such as cerebral palsy or Parkinson's disease.  What are the signs or symptoms?  Common symptoms of this condition include:  A feeling that solids or liquids are stuck in your throat on the way down to the stomach.  Pain while swallowing.  Coughing or gagging while trying to swallow.  Other symptoms include:  Food moving back from your stomach to your mouth " (regurgitation).  Noises coming from your throat.  Chest discomfort when swallowing.  A feeling of fullness when swallowing.  Drooling, especially when the throat is blocked.  Heartburn.  How is this diagnosed?  This condition may be diagnosed by:  Barium swallow X-ray. In this test, you will swallow a white liquid that sticks to the inside of your esophagus. X-ray images are then taken.  Endoscopy. In this test, a flexible telescope is inserted down your throat to look at your esophagus and your stomach.  CT scans or an MRI.  How is this treated?  Treatment for dysphagia depends on the cause of this condition:  If the dysphagia is caused by acid reflux or infection, medicines may be used. These may include antibiotics or heartburn medicines.  If the dysphagia is caused by problems with the muscles, swallowing therapy may be used to help you strengthen your swallowing muscles. You may have to do specific exercises to strengthen the muscles or stretch them.  If the dysphagia is caused by a blockage or mass, procedures to remove the blockage may be done. You may need surgery and a feeding tube.  You may need to make diet changes. Ask your health care provider for specific instructions.  Follow these instructions at home:  Medicines  Take over-the-counter and prescription medicines only as told by your health care provider.  If you were prescribed an antibiotic medicine, take it as told by your health care provider. Do not stop taking the antibiotic even if you start to feel better.  Eating and drinking    Make any diet changes as told by your health care provider.  Work with a diet and nutrition specialist (dietitian) to create an eating plan that will help you get the nutrients you need in order to stay healthy.  Eat soft foods that are easier to swallow.  Cut your food into small pieces and eat slowly. Take small bites.  Eat and drink only when you are sitting upright.  Do not drink alcohol or caffeine. If you need  help quitting, ask your health care provider.    General instructions  Check your weight every day to make sure you are not losing weight.  Do not use any products that contain nicotine or tobacco. These products include cigarettes, chewing tobacco, and vaping devices, such as e-cigarettes. If you need help quitting, ask your health care provider.  Keep all follow-up visits. This is important.  Contact a health care provider if:  You lose weight because you cannot swallow.  You cough when you drink liquids.  You cough up partially digested food.  Get help right away if:  You cannot swallow your saliva.  You have shortness of breath, a fever, or both.  Your voice is hoarse and you have trouble swallowing.  These symptoms may represent a serious problem that is an emergency. Do not wait to see if the symptoms will go away. Get medical help right away. Call your local emergency services (911 in the [place] Do not drive yourself to the hospital.  Summary  Dysphagia is trouble swallowing. This condition occurs when solids and liquids stick in a person's throat on the way down to the stomach. You may cough or gag while trying to swallow.  Dysphagia has many possible causes.  Treatment for dysphagia depends on the cause of the condition.  Keep all follow-up visits. This is important.  This information is not intended to replace advice given to you by your health care provider. Make sure you discuss any questions you have with your health care provider.  Document Revised: 08/07/2021 Document Reviewed: 08/07/2021  Elsevier Patient Education © 2021 Elsevier Inc.  Food Choices for Gastroesophageal Reflux Disease, Adult  When you have gastroesophageal reflux disease (GERD), the foods you eat and your eating habits are very important. Choosing the right foods can help ease your discomfort. Think about working with a food expert (dietitian) to help you make good choices.  What are tips for following this plan?  Reading food  labels  Look for foods that are low in saturated fat. Foods that may help with your symptoms include:  Foods that have less than 5% of daily value (DV) of fat.  Foods that have 0 grams of trans fat.  Cooking  Do not choudhury your food.  Cook your food by baking, steaming, grilling, or broiling. These are all methods that do not need a lot of fat for cooking.  To add flavor, try to use herbs that are low in spice and acidity.  Meal planning    Choose healthy foods that are low in fat, such as:  Fruits and vegetables.  Whole grains.  Low-fat dairy products.  Lean meats, fish, and poultry.  Eat small meals often instead of eating 3 large meals each day. Eat your meals slowly in a place where you are relaxed. Avoid bending over or lying down until 2-3 hours after eating.  Limit high-fat foods such as fatty meats or fried foods.  Limit your intake of fatty foods, such as oils, butter, and shortening.  Avoid the following as told by your doctor:  Foods that cause symptoms. These may be different for different people. Keep a food diary to keep track of foods that cause symptoms.  Alcohol.  Drinking a lot of liquid with meals.  Eating meals during the 2-3 hours before bed.    Lifestyle  Stay at a healthy weight. Ask your doctor what weight is healthy for you. If you need to lose weight, work with your doctor to do so safely.  Exercise for at least 30 minutes on 5 or more days each week, or as told by your doctor.  Wear loose-fitting clothes.  Do not smoke or use any products that contain nicotine or tobacco. If you need help quitting, ask your doctor.  Sleep with the head of your bed higher than your feet. Use a wedge under the mattress or blocks under the bed frame to raise the head of the bed.  Chew sugar-free gum after meals.  What foods should eat?    Eat a healthy, well-balanced diet of fruits, vegetables, whole grains, low-fat dairy products, lean meats, fish, and poultry. Each person is different.  Foods that may cause  symptoms in one person may not cause any symptoms in another person. Work with your doctor to find foods that are safe for you.  The items listed above may not be a complete list of what you can eat and drink. Contact a food expert for more options.  What foods should I avoid?  Limiting some of these foods may help in managing the symptoms of GERD. Everyone is different. Talk with a food expert or your doctor to help you find the exact foods to avoid, if any.  Fruits  Any fruits prepared with added fat. Any fruits that cause symptoms. For some people, this may include citrus fruits, such as oranges, grapefruit, pineapple, and luke.  Vegetables  Deep-fried vegetables. French fries. Any vegetables prepared with added fat. Any vegetables that cause symptoms. For some people, this may include tomatoes and tomato products, chili peppers, onions and garlic, and horseradish.  Grains  Pastries or quick breads with added fat.  Meats and other proteins  High-fat meats, such as fatty beef or pork, hot dogs, ribs, ham, sausage, salami, and dill. Fried meat or protein, including fried fish and fried chicken. Nuts and nut butters, in large amounts.  Dairy  Whole milk and chocolate milk. Sour cream. Cream. Ice cream. Cream cheese. Milkshakes.  Fats and oils  Butter. Margarine. Shortening. Ghee.  Beverages  Coffee and tea, with or without caffeine. Carbonated beverages. Sodas. Energy drinks. Fruit juice made with acidic fruits, such as orange or grapefruit. Tomato juice. Alcoholic drinks.  Sweets and desserts  Chocolate and cocoa. Donuts.  Seasonings and condiments  Pepper. Peppermint and spearmint. Added salt. Any condiments, herbs, or seasonings that cause symptoms. For some people, this may include wasserman, hot sauce, or vinegar-based salad dressings.  The items listed above may not be a complete list of what you should not eat and drink. Contact a food expert for more options.  Questions to ask your doctor  Diet and lifestyle  "changes are often the first steps that are taken to manage symptoms of GERD. If diet and lifestyle changes do not help, talk with your doctor about taking medicines.  Where to find more information  International Foundation for Gastrointestinal Disorders: aboutgerd.org  Summary  When you have GERD, food and lifestyle choices are very important in easing your symptoms.  Eat small meals often instead of 3 large meals a day. Eat your meals slowly and in a place where you are relaxed.  Avoid bending over or lying down until 2-3 hours after eating.  Limit high-fat foods such as fatty meats or fried foods.  This information is not intended to replace advice given to you by your health care provider. Make sure you discuss any questions you have with your health care provider.  Document Revised: 06/28/2021 Document Reviewed: 06/28/2021  ElseEveryday.me Patient Education © 2021 [employer]  Diet for Irritable Bowel Syndrome  When you have irritable bowel syndrome (IBS), it is very important to eat the foods and follow the eating habits that are best for your condition. IBS may cause various symptoms such as pain in the abdomen, constipation, or diarrhea. Choosing the right foods can help to ease the discomfort from these symptoms. Work with your health care provider and diet and nutrition specialist (dietitian) to find the eating plan that will help to control your symptoms.  What are tips for following this plan?         Keep a food diary. This will help you identify foods that cause symptoms. Write down:  What you eat and when you eat it.  What symptoms you have.  When symptoms occur in relation to your meals, such as \"pain in abdomen 2 hours after dinner.\"  Eat your meals slowly and in a relaxed setting.  Aim to eat 5-6 small meals per day. Do not skip meals.  Drink enough fluid to keep your urine pale yellow.  Ask your health care provider if you should take an over-the-counter probiotic to help restore healthy bacteria in " your gut (digestive tract).  Probiotics are foods that contain good bacteria and yeasts.  Your dietitian may have specific dietary recommendations for you based on your symptoms. He or she may recommend that you:  Avoid foods that cause symptoms. Talk with your dietitian about other ways to get the same nutrients that are in those problem foods.  Avoid foods with gluten. Gluten is a protein that is found in rye, wheat, and barley.  Eat more foods that contain soluble fiber. Examples of foods with high soluble fiber include oats, seeds, and certain fruits and vegetables. Take a fiber supplement if directed by your dietitian.  Reduce or avoid certain foods called FODMAPs. These are foods that contain carbohydrates that are hard to digest. Ask your doctor which foods contain these carbohydrates.  What foods are not recommended?  The following are some foods and drinks that may make your symptoms worse:  Fatty foods, such as french fries.  Foods that contain gluten, such as pasta and cereal.  Dairy products, such as milk, cheese, and ice cream.  Chocolate.  Alcohol.  Products with caffeine, such as coffee.  Carbonated drinks, such as soda.  Foods that are high in FODMAPs. These include certain fruits and vegetables.  Products with sweeteners such as honey, high fructose corn syrup, sorbitol, and mannitol.  The items listed above may not be a complete list of foods and beverages you should avoid. Contact a dietitian for more information.  What foods are good sources of fiber?  Your health care provider or dietitian may recommend that you eat more foods that contain fiber. Fiber can help to reduce constipation and other IBS symptoms. Add foods with fiber to your diet a little at a time so your body can get used to them. Too much fiber at one time might cause gas and swelling of your abdomen. The following are some foods that are good sources of fiber:  Berries, such as raspberries, strawberries, and  blueberries.  Tomatoes.  Carrots.  Brown rice.  Oats.  Seeds, such as chandrika and pumpkin seeds.  The items listed above may not be a complete list of recommended sources of fiber. Contact your dietitian for more options.  Where to find more information  International Foundation for Functional Gastrointestinal Disorders: www.iffgd.org  National [place] of Diabetes and Digestive and Kidney Diseases: www.niddk.nih.gov  Summary  When you have irritable bowel syndrome (IBS), it is very important to eat the foods and follow the eating habits that are best for your condition.  IBS may cause various symptoms such as pain in the abdomen, constipation, or diarrhea.  Choosing the right foods can help to ease the discomfort that comes from symptoms.  Keep a food diary. This will help you identify foods that cause symptoms.  Your health care provider or diet and nutrition specialist (dietitian) may recommend that you eat more foods that contain fiber.  This information is not intended to replace advice given to you by your health care provider. Make sure you discuss any questions you have with your health care provider.  Document Revised: 08/19/2021 Document Reviewed: 08/19/2021  Elsevier Patient Education © 2021 Elsevier Inc.

## 2022-05-03 ENCOUNTER — DOCUMENTATION (OUTPATIENT)
Dept: PSYCHIATRY | Facility: CLINIC | Age: 37
End: 2022-05-03

## 2022-05-03 NOTE — PROGRESS NOTES
Patient is having some side effects possibly related to bupropion including worsening migraine symptoms as well as abdominal stabbing pain.  I called her and left a message to call back.

## 2022-05-04 ENCOUNTER — TELEPHONE (OUTPATIENT)
Dept: PSYCHIATRY | Facility: CLINIC | Age: 37
End: 2022-05-04

## 2022-05-15 DIAGNOSIS — S06.9X9D TRAUMATIC BRAIN INJURY WITH LOSS OF CONSCIOUSNESS, SUBSEQUENT ENCOUNTER: ICD-10-CM

## 2022-05-15 DIAGNOSIS — F41.1 GENERALIZED ANXIETY DISORDER: ICD-10-CM

## 2022-05-16 RX ORDER — DIVALPROEX SODIUM 250 MG/1
250 TABLET, EXTENDED RELEASE ORAL DAILY
Qty: 30 TABLET | Refills: 2 | Status: SHIPPED | OUTPATIENT
Start: 2022-05-16 | End: 2022-06-16 | Stop reason: SDUPTHER

## 2022-06-16 ENCOUNTER — OFFICE VISIT (OUTPATIENT)
Dept: PSYCHIATRY | Facility: CLINIC | Age: 37
End: 2022-06-16

## 2022-06-16 VITALS — HEIGHT: 68 IN | BODY MASS INDEX: 17.13 KG/M2 | WEIGHT: 113 LBS

## 2022-06-16 DIAGNOSIS — S06.9X9D TRAUMATIC BRAIN INJURY WITH LOSS OF CONSCIOUSNESS, SUBSEQUENT ENCOUNTER: ICD-10-CM

## 2022-06-16 DIAGNOSIS — F90.0 ADHD (ATTENTION DEFICIT HYPERACTIVITY DISORDER), INATTENTIVE TYPE: ICD-10-CM

## 2022-06-16 DIAGNOSIS — F43.10 POST TRAUMATIC STRESS DISORDER (PTSD): ICD-10-CM

## 2022-06-16 DIAGNOSIS — F41.1 GENERALIZED ANXIETY DISORDER: Primary | ICD-10-CM

## 2022-06-16 DIAGNOSIS — F33.1 MAJOR DEPRESSIVE DISORDER, RECURRENT EPISODE, MODERATE: ICD-10-CM

## 2022-06-16 DIAGNOSIS — F41.0 PANIC ATTACKS: ICD-10-CM

## 2022-06-16 DIAGNOSIS — F51.05 INSOMNIA DUE TO MENTAL CONDITION: ICD-10-CM

## 2022-06-16 PROCEDURE — 90833 PSYTX W PT W E/M 30 MIN: CPT | Performed by: STUDENT IN AN ORGANIZED HEALTH CARE EDUCATION/TRAINING PROGRAM

## 2022-06-16 PROCEDURE — 99214 OFFICE O/P EST MOD 30 MIN: CPT | Performed by: STUDENT IN AN ORGANIZED HEALTH CARE EDUCATION/TRAINING PROGRAM

## 2022-06-16 RX ORDER — DIVALPROEX SODIUM 500 MG/1
500 TABLET, EXTENDED RELEASE ORAL DAILY
Qty: 30 TABLET | Refills: 2 | Status: SHIPPED | OUTPATIENT
Start: 2022-06-16 | End: 2022-09-27 | Stop reason: DRUGHIGH

## 2022-06-16 NOTE — PATIENT INSTRUCTIONS
1.  Please return to clinic at your next scheduled visit.  Contact the clinic ([phone]) at least 24 hours prior in the event you need to cancel.  2.  Do no harm to yourself or others.    3.  Avoid alcohol and drugs.    4.  Take all medications as prescribed.  Please contact the clinic with any concerns. If you are in need of medication refills, please call the clinic at [phone].    5. Should you want to get in touch with your provider, Dr. Sarita Schultz, please utilize [employer] or contact the office ([phone]), and staff will be able to page Dr. Schultz directly.  6.  In the event you have personal crisis, contact the following crisis numbers: Suicide Prevention Hotline 1-688.144.2325; [facility] Helpline 5-199-058-WTOJ; [facility] Emergency Room [phone]; text HELLO to 011250; or 607.     SPECIFIC RECOMMENDATIONS:     1.      Medications discussed at this encounter:                   - increase depakote     2.      Psychotherapy recommendations:      3.     Return to clinic: 2 mos

## 2022-06-16 NOTE — PROGRESS NOTES
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 enhanced to build distress tolerance skills and emotional regulation. Patient given education on medication side effects, diagnosis/illness and relapse symptoms. Plan to continue supportive psychotherapy in next appointment to provide symptom relief.  6 wks      PLAN:  28. Safety: No acute safety concerns  29. Therapy: Referral Made  30. Risk Assessment: Risk of self-harm acutely is moderate.  Risk factors include anxiety disorder, mood disorder, PTSD, AODA, access to weapons, and recent psychosocial stressors (pandemic). Protective factors include no family history, no present SI, no history of suicide attempts or self-harm in the past, healthcare seeking, future orientation, willingness to engage in care.  Risk of self-harm chronically is also moderate, but could be further elevated in the event of treatment noncompliance and/or AODA.  31. Meds:  a. AGREE with propranolol.  b. INCREASE Depakote 250 to 500 mg p.o. nightly. Risks, benefits, alternatives discussed with patient including nausea and vomiting, GI upset, sedation, dizziness/falls risk, increased appetite. After discussion of these risks and benefits, the patient voiced understanding and agreed to proceed. Patient also informed of the need to take as prescribed, and for periodic labwork.  c. STOP Wellbutrin  mg daily. Risks,  benefits, alternatives discussed with patient including nausea, GI upset, increased energy, exacerbation of irritability, insomnia, lowering of seizure threshold.  After discussion of these risks and benefits, the patient voiced understanding and agreed to proceed.  32. Labs: Needs a urine drug screen soon  33. Follow up: 6 weeks    Patient screened positive for depression based on a PHQ-9 score of  on . Follow-up recommendations include: Suicide Risk Assessment performed.           TREATMENT PLAN/GOALS: Continue supportive psychotherapy efforts and medications as indicated. Treatment and medication options discussed during today's visit. Patient acknowledged and verbally consented to continue with current treatment plan and was educated on the importance of compliance with treatment and follow-up appointments.    MEDICATION ISSUES:  MAURA reviewed as expected.  Discussed medication options and treatment plan of prescribed medication as well as the risks, benefits, and side effects including potential falls, possible impaired driving and metabolic adversities among others. Patient is agreeable to call the office with any worsening of symptoms or onset of side effects. Patient is agreeable to call 911 or go to the nearest ER should he/she begin having SI/HI. No medication side effects or related complaints today.     MEDS ORDERED DURING VISIT:  New Medications Ordered This Visit   Medications   • divalproex (DEPAKOTE ER) 500 MG 24 hr tablet     Sig: Take 1 tablet by mouth Daily.     Dispense:  30 tablet     Refill:  2       Return in about 2 months (around 8/16/2022).         This document has been electronically signed by Sarita Schultz MD  June 16, 2022 10:51 EDT      Part of this note may be an electronic transcription/translation of spoken language to printed text using the Dragon Dictation System.

## 2022-06-18 VITALS
OXYGEN SATURATION: 100 % | TEMPERATURE: 98 F | BODY MASS INDEX: 18.14 KG/M2 | SYSTOLIC BLOOD PRESSURE: 103 MMHG | HEIGHT: 68 IN | DIASTOLIC BLOOD PRESSURE: 66 MMHG | WEIGHT: 119.71 LBS | RESPIRATION RATE: 18 BRPM | HEART RATE: 73 BPM

## 2022-06-18 PROCEDURE — 99283 EMERGENCY DEPT VISIT LOW MDM: CPT

## 2022-06-19 ENCOUNTER — APPOINTMENT (OUTPATIENT)
Dept: CT IMAGING | Facility: HOSPITAL | Age: 37
End: 2022-06-19

## 2022-06-19 ENCOUNTER — HOSPITAL ENCOUNTER (EMERGENCY)
Facility: HOSPITAL | Age: 37
Discharge: HOME | End: 2022-06-19
Attending: EMERGENCY MEDICINE | Admitting: EMERGENCY MEDICINE

## 2022-06-19 DIAGNOSIS — S29.011A CHEST WALL MUSCLE STRAIN, INITIAL ENCOUNTER: Primary | ICD-10-CM

## 2022-06-19 PROCEDURE — 71250 CT THORAX DX C-: CPT

## 2022-06-19 RX ORDER — CYCLOBENZAPRINE HCL 10 MG
10 TABLET ORAL 3 TIMES DAILY PRN
Qty: 15 TABLET | Refills: 0 | Status: SHIPPED | OUTPATIENT
Start: 2022-06-19 | End: 2022-07-05 | Stop reason: SDUPTHER

## 2022-06-19 RX ORDER — HYDROCODONE BITARTRATE AND ACETAMINOPHEN 7.5; 325 MG/1; MG/1
1 TABLET ORAL ONCE
Status: COMPLETED | OUTPATIENT
Start: 2022-06-19 | End: 2022-06-19

## 2022-06-19 RX ADMIN — HYDROCODONE BITARTRATE AND ACETAMINOPHEN 1 TABLET: 7.5; 325 TABLET ORAL at 02:36

## 2022-06-19 NOTE — ED PROVIDER NOTES
"Time: 03:00 EDT  Arrived by: Private vehicle  Chief Complaint: Right breast pain  History provided by: Patient  History is limited by: N/A    History of Present Illness:  Patient is a 37 y.o. year old female that presents to the emergency department with right side pain in breast      History provided by:  Patient  Breast Problem  Location:  Right  Quality:  Throbbing and aching  Severity:  Severe  Onset quality:  Gradual  Duration:  8 hours  Timing:  Constant  Progression:  Worsening  Chronicity:  New  Context:  Patient was helping her  move a Hutch and felt sudden instant pain and tearing sensation in her right breast.  Afraid she has disrupted something from her reconstructive surgery  Relieved by:  Nothing  Worsened by:  Movement or touch  Ineffective treatments:  Staying still  Associated symptoms: chest pain ( Right side chest wall pain)    Associated symptoms: no abdominal pain, no congestion, no cough, no diarrhea, no ear pain, no fever, no headaches, no nausea, no shortness of breath, no sore throat and no vomiting            Similar Symptoms Previously: No  Recently seen: Had reconstructive surgery in 2020 and then redone in 2021 by Dr. Boles      Patient Care Team  Primary Care Provider: zunilda martinez    Past Medical History:     Allergies   Allergen Reactions   • Bee Venom Shortness Of Breath and Swelling   • Latex Itching, Swelling and Rash     Swelling at site, rash and itching    • Metronidazole Rash   • Omeprazole Other (See Comments)     Causes thrush.   • Paroxetine Other (See Comments)     Severe migraine      Past Medical History:   Diagnosis Date   • ADHD (attention deficit hyperactivity disorder) 2000    Never treated due to anxiety being \"worse\"   • Anemia    • Anxiety    • Arthritis    • Chronic pain disorder 2003    Back issues following childbirth,  2010 was crushed   • Colon polyp    • Condition not found 09/04/2015    left gluteus medius insufficiency   • Depression    • Disease of " thyroid gland 2020    Hyperthyroidism,enlarged,moderate nodules   • Family history of malignant neoplasm in father 07/02/2021    Added automatically from request for surgery [id number]   • GERD (gastroesophageal reflux disease)    • Head injury 07/02/2021   • Hemorrhoids    • History of MRSA infection     2010- WOUND COLINIZED   • HL (hearing loss)    • Irritable bowel syndrome    • Kidney stone    • Lactose intolerance    • Migraine headache    • Obsessive-compulsive disorder 2000   • Panic disorder 2000   • PONV (postoperative nausea and vomiting)     slight nausea   • Psychiatric illness 2000   • PTSD (post-traumatic stress disorder) 2000   • Spinal headache     post epidural post child birth   • Tobacco use 02/08/2022   • Trace mitral valve regurgitation 02/08/2022   • Withdrawal symptoms, drug or narcotic (HCC) 2011    From pain medication following getting crushed and hospitali     Past Surgical History:   Procedure Laterality Date   • ABDOMINAL SURGERY  2004; 2010    Laproscopic; splenectomy and multiple organ fixations   • BONY PELVIS SURGERY     • BREAST AUGMENTATION  01/08/2020   • BREAST SURGERY  2010; 2020    Trauma/removal of breast; 2 reconstructive surgeries   • COLONOSCOPY N/A 09/01/2021    Procedure: COLONOSCOPY;  Surgeon: Haroon Collins MD;  Location: [facility] ENDOSCOPY;  Service: Gastroenterology;  Laterality: N/A;  NORMAL COLONOSCOPY   • COSMETIC SURGERY  2020    2 reconstructive breast surheries w implants   • DILATION AND CURETTAGE, DIAGNOSTIC / THERAPEUTIC  2004   • ENDOSCOPY  2007 2015   • ENDOSCOPY N/A 09/01/2021    Procedure: ESOPHAGOGASTRODUODENOSCOPY WITH BIOPSY;  Surgeon: Haroon Collins MD;  Location: [facility] ENDOSCOPY;  Service: Gastroenterology;  Laterality: N/A;  HIATAL HERNIA   • FAT GRAFTING  01/08/2020    fat grafting to right breast    • FRACTURE SURGERY  1990; 2010    Arm; hips, leg, wrist   • HARDWARE REMOVAL  2011 2014   • HEMORRHOIDECTOMY  2014    During childbirth    • HIP ORIF W/ CAPSULOTOMY     • LUNG SURGERY     • OTHER SURGICAL HISTORY      metal implants   • SPLENECTOMY  2010   • UPPER GASTROINTESTINAL ENDOSCOPY     • WRIST SURGERY  2020     Family History   Problem Relation Age of Onset   • Heart disease Mother    • Arthritis Mother    • Anxiety disorder Mother    • Bipolar disorder Mother    • Depression Mother    • Colon cancer Father    • Cancer Father    • Arthritis Father    • Osteoporosis Father    • Heart disease Father    • Bipolar disorder Father    • Depression Father    • Colon polyps Father    • Bipolar disorder Sister    • Depression Sister    • Self-Injurious Behavior  Sister    • Hypertension Brother    • Anxiety disorder Brother    • Depression Brother    • Irritable bowel syndrome Brother    • Hypertension Brother    • Depression Brother    • Lung cancer Other    • Clotting disorder Other    • Diabetes Other    • Uterine cancer Other    • Irritable bowel syndrome Maternal Grandmother    • Colon cancer Paternal Grandmother    • Malig Hyperthermia Neg Hx        Home Medications:  Prior to Admission medications    Medication Sig Start Date End Date Taking? Authorizing Provider   divalproex (DEPAKOTE ER) 500 MG 24 hr tablet Take 1 tablet by mouth Daily. 6/16/22   Sarita Schultz MD   hyoscyamine (ANASPAZ,LEVSIN) 0.125 MG tablet Take 1 tablet by mouth Every 6 (Six) Hours As Needed for Cramping. 5/2/22   Soha Bernard APRN   propranolol (INDERAL) 20 MG tablet Take 1 tablet by mouth 3 (Three) Times a Day. 1/5/22   Bernard Freitas APRN   rizatriptan (MAXALT) 10 MG tablet Take 10 mg by mouth. 1/27/22   Provider, MD Tyshawn   vitamin D (ERGOCALCIFEROL) 1.25 MG (65896 UT) capsule capsule Take 1 capsule by mouth 1 (One) Time Per Week. 4/6/22   Bernard Freitas APRN        Social History:   PT  reports that she has been smoking cigarettes and cigarettes. She has a 20.00 pack-year smoking history. She has never used smokeless tobacco. She reports current alcohol  "use. She reports that she does not use drugs.    Record Review:  I have reviewed the patient's records in [facility].     Review of Systems  Review of Systems   Constitutional: Negative for chills and fever.   HENT: Negative for congestion, ear pain and sore throat.    Eyes: Negative for pain.   Respiratory: Negative for cough, chest tightness and shortness of breath.    Cardiovascular: Positive for chest pain ( Right side chest wall pain).   Gastrointestinal: Negative for abdominal pain, diarrhea, nausea and vomiting.   Genitourinary: Negative for flank pain and hematuria.   Musculoskeletal: Negative for joint swelling.   Skin: Negative for pallor.   Neurological: Negative for seizures and headaches.   Psychiatric/Behavioral: Negative.    All other systems reviewed and are negative.       Physical Exam  /66 (BP Location: Left arm, Patient Position: Sitting)   Pulse 73   Temp 98 °F (36.7 °C) (Oral)   Resp 18   Ht 172.7 cm (68\")   Wt 54.3 kg (119 lb 11.4 oz)   LMP 06/18/2022   SpO2 100%   BMI 18.20 kg/m²     Physical Exam  Vitals and nursing note reviewed.   Constitutional:       Appearance: Normal appearance.   HENT:      Head: Atraumatic.   Cardiovascular:      Rate and Rhythm: Normal rate and regular rhythm.      Heart sounds: Normal heart sounds.   Pulmonary:      Effort: Pulmonary effort is normal.      Breath sounds: Normal breath sounds.   Chest:      Chest wall: Tenderness present.   Breasts:      Right: Swelling and tenderness present. No nipple discharge.         Musculoskeletal:         General: Normal range of motion.      Cervical back: Normal range of motion.   Skin:     General: Skin is warm and dry.   Neurological:      Mental Status: She is alert and oriented to person, place, and time.   Psychiatric:         Mood and Affect: Mood normal.         Behavior: Behavior normal.          ED Course  /66 (BP Location: Left arm, Patient Position: Sitting)   Pulse 73   Temp 98 °F (36.7 °C) " "(Oral)   Resp 18   Ht 172.7 cm (68\")   Wt 54.3 kg (119 lb 11.4 oz)   LMP 06/18/2022   SpO2 100%   BMI 18.20 kg/m²   Results for orders placed or performed in visit on 04/05/22   Duplex Carotid Ultrasound CAR   Result Value Ref Range    Target HR (85%) 156 bpm    Max. Pred. HR (100%) 183 bpm    Prox CCA  cm/sec    Prox CCA EDV 27 cm/sec    Right Mid CCA  cm/sec    right Mid CCA EDV 33 cm/sec    Dist CCA PSV 76 cm/sec    Dist CCA EDV 23 cm/sec    Prox ECA PSV 87 cm/sec    Prox ECA EDV 16 cm/sec    Prox ICA PSV 94 cm/sec    Prox ICA EDV 42 cm/sec    Mid ICA PSV 86 cm/sec    Mid ICA EDV 37 cm/sec    Dist ICA PSV 85 cm/sec    Dist ICA EDV 38 cm/sec    Vertebral A PSV 94 cm/sec    Vertebral A EDV 29 cm/sec    Prox CCA  cm/sec    Prox CCA EDV 19 cm/sec    left Mid CCA PSV 90 cm/sec    left Mid CCA EDV 26 cm/sec    Dist CCA PSV 95 cm/sec    Dist CCA EDV 36 cm/sec    Prox ECA  cm/sec    Prox ECA EDV 24 cm/sec    Prox ICA PSV 87 cm/sec    Prox ICA EDV 37 cm/sec    Mid ICA  cm/sec    Mid ICA EDV 54 cm/sec    Dist ICA  cm/sec    Dist ICA EDV 46 cm/sec    Vertebral A PSV 49 cm/sec    Vertebral A EDV 9 cm/sec     Medications   HYDROcodone-acetaminophen (NORCO) 7.5-325 MG per tablet 1 tablet (1 tablet Oral Given 6/19/22 0236)     CT Chest Without Contrast Diagnostic    Result Date: 6/19/2022  Narrative: PROCEDURE: CT CHEST WO CONTRAST DIAGNOSTIC  COMPARISON: 1/24/2017.  INDICATIONS: RIGHT BREAST PAIN AFTER LIFTING OBJECT TODAY.  TECHNIQUE: 665 CT images were created without the administration of contrast material.   PROTOCOL:   Standard imaging protocol performed.    RADIATION:   DLP: 198.1 mGy*cm.   Automated exposure control was utilized to minimize radiation dose.  FINDINGS: There are bilateral breast implants, new since the prior study.  Chronic calcified granulomatous disease involves the chest.  No pleural or pericardial effusion.  No pneumothorax.  No cardiac enlargement.  The " central tracheobronchial tree is well aerated without filling defect.  Mild biapical pleural-parenchymal scarring is seen.  Minimal paraseptal emphysematous changes involve the left pulmonary apex.  There may be pleural-parenchymal scarring in the right lung base, seen previously.  There may be slight interval increase in a nodular infiltrate within the right middle lobe, seen previously.  It may be related to mucoid impaction.  The finding is thought less likely to be acute in nature.  There may be chronic fibrosis and/or subsegmental atelectasis within the lingula.  Otherwise, no infiltrates are seen.  No acute fracture is seen.  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  There is nonobstructing bilateral nephrolithiasis.      Impression:  No definite significant acute findings are seen by nonenhanced CT examination of the chest.     COMMENT:  Part of this note is an electronic transcription of spoken language to printed text. The electronic translation/transcription may permit erroneous, or at times, nonsensical (or even sensical) words or phrases to be inadvertently transcribed or omitted; this  has reviewed the note for such errors (as well as additional errors); however, some may still exist.  JULIANA WINCHESTER JR, MD       Electronically Signed and Approved By: JULIANA WINCHESTER JR, MD on 6/19/2022 at 2:41                  Medical Decision Making:                     MDM  Number of Diagnoses or Management Options  Chest wall muscle strain, initial encounter  Diagnosis management comments: I have spoken with the patient. I have explained the patient´s condition, diagnoses and treatment plan based on the information available to me at this time. I have answered the patient's questions and addressed any concerns. The patient has a good  understanding of the patient´s diagnosis, condition, and treatment plan as can be expected at this point. The vital signs have been  stable. The patient´s condition is stable and appropriate for discharge from the emergency department.      The patient will pursue further outpatient evaluation with the primary care physician or other designated or consulting physician as outlined in the discharge instructions. They are agreeable to this plan of care and follow-up instructions have been explained in detail. The patient has received these instructions in written format and have expressed an understanding of the discharge instructions. The patient is aware that any significant change in condition or worsening of symptoms should prompt an immediate return to this or the closest emergency department or call to 911.         Amount and/or Complexity of Data Reviewed  Tests in the radiology section of CPT®: ordered and reviewed  Tests in the medicine section of CPT®: ordered and reviewed    Risk of Complications, Morbidity, and/or Mortality  Presenting problems: low  Diagnostic procedures: low  Management options: low    Patient Progress  Patient progress: stable       Final diagnoses:   Chest wall muscle strain, initial encounter        Disposition:  ED Disposition     ED Disposition   Discharge    Condition   Stable    Comment   --              Yun Weinstein, APRN  06/19/22 5521

## 2022-06-19 NOTE — DISCHARGE INSTRUCTIONS
CT scan was negative and did not show any acute abnormality.    Follow-up with plastics.  Call office tomorrow morning for next available appointment.    Take medication for symptomatic treatment    Ice or moist heat

## 2022-06-30 DIAGNOSIS — Z71.89 ENCOUNTER TO DISCUSS BREAST RECONSTRUCTION: Primary | ICD-10-CM

## 2022-07-05 ENCOUNTER — OFFICE VISIT (OUTPATIENT)
Dept: FAMILY MEDICINE CLINIC | Facility: CLINIC | Age: 37
End: 2022-07-05

## 2022-07-05 VITALS
WEIGHT: 121.4 LBS | HEIGHT: 68 IN | HEART RATE: 61 BPM | SYSTOLIC BLOOD PRESSURE: 98 MMHG | DIASTOLIC BLOOD PRESSURE: 64 MMHG | BODY MASS INDEX: 18.4 KG/M2 | TEMPERATURE: 97.7 F | OXYGEN SATURATION: 99 %

## 2022-07-05 DIAGNOSIS — N64.4 BREAST PAIN, RIGHT: Primary | ICD-10-CM

## 2022-07-05 DIAGNOSIS — F17.200 NICOTINE DEPENDENCE WITH CURRENT USE: ICD-10-CM

## 2022-07-05 DIAGNOSIS — Z98.82 HISTORY OF BILATERAL BREAST IMPLANTS: ICD-10-CM

## 2022-07-05 PROCEDURE — 99213 OFFICE O/P EST LOW 20 MIN: CPT | Performed by: NURSE PRACTITIONER

## 2022-07-05 RX ORDER — CYCLOBENZAPRINE HCL 10 MG
10 TABLET ORAL 2 TIMES DAILY PRN
Qty: 60 TABLET | Refills: 1 | Status: SHIPPED | OUTPATIENT
Start: 2022-07-05 | End: 2022-08-12 | Stop reason: SDUPTHER

## 2022-07-05 NOTE — PROGRESS NOTES
"Answers for HPI/ROS submitted by the patient on 7/5/2022  Please describe your symptoms.: Breast injury/pain  Have you had these symptoms before?: Yes  How long have you been having these symptoms?: 5-7 days  Please describe any probable cause for these symptoms. : I was helping to move something with my   What is the primary reason for your visit?: Other    Chief Complaint  Breast Pain and Breast Problem    Subjective        Seda Pardo Brodiejake presents to [facility]  History of Present Illness  Presents today for an acute visit for right breast pain.  She reports about a 1 to 2 weeks ago that she was lifting an object which she felt tearing and popping in her right breast.  She has a history of breast implants.  She was concerned that the implant moved were ruptured.  She went to the emergency department had a CT scan.  She had breast implant was intact.  Approximately a year and a half ago she had breast reconstruction and a breast implant replaced due to a breast implant popping.  She states the pain is improving.  She previously saw Dr. Boles plastic surgeon.    Social History     Socioeconomic History   • Marital status:    Tobacco Use   • Smoking status: Current Every Day Smoker     Packs/day: 1.00     Years: 20.00     Pack years: 20.00     Types: Cigarettes, Cigarettes   • Smokeless tobacco: Never Used   • Tobacco comment: I've temporarily quit several times but always end up going back to smoking   Vaping Use   • Vaping Use: Former   • Substances: Nicotine, CBD, Flavoring   • Devices: Pre-filled pod   Substance and Sexual Activity   • Alcohol use: Yes     Comment: Socially, very occasionally   • Drug use: Never   • Sexual activity: Yes     Partners: Male     Birth control/protection: Rhythm       Objective   Vital Signs:  BP 98/64   Pulse 61   Temp 97.7 °F (36.5 °C)   Ht 172.7 cm (68\")   Wt 55.1 kg (121 lb 6.4 oz)   SpO2 99%   BMI 18.46 kg/m² " "  Estimated body mass index is 18.46 kg/m² as calculated from the following:    Height as of this encounter: 172.7 cm (68\").    Weight as of this encounter: 55.1 kg (121 lb 6.4 oz).          Physical Exam  Vitals reviewed.   Constitutional:       Appearance: Normal appearance. She is well-developed.   HENT:      Head: Normocephalic and atraumatic.      Right Ear: External ear normal.      Left Ear: External ear normal.      Mouth/Throat:      Pharynx: No oropharyngeal exudate.   Eyes:      Conjunctiva/sclera: Conjunctivae normal.      Pupils: Pupils are equal, round, and reactive to light.   Cardiovascular:      Rate and Rhythm: Normal rate and regular rhythm.      Heart sounds: No murmur heard.    No friction rub. No gallop.   Pulmonary:      Effort: Pulmonary effort is normal.      Breath sounds: Normal breath sounds. No wheezing or rhonchi.   Abdominal:      General: Bowel sounds are normal. There is no distension.      Palpations: Abdomen is soft.      Tenderness: There is no abdominal tenderness.   Skin:     General: Skin is warm and dry.   Neurological:      Mental Status: She is alert and oriented to person, place, and time.   Psychiatric:         Mood and Affect: Mood and affect normal.         Behavior: Behavior normal.         Thought Content: Thought content normal.         Judgment: Judgment normal.        Result Review :    Common labs    Common Labsle 10/28/21 10/28/21 10/28/21 4/5/22 4/5/22    1211 1211 1211 1134 1134   Glucose   77  76   BUN   12  10   Creatinine   0.68  0.68   eGFR Non African Am   98     Sodium   137  139   Potassium   5.0  4.8   Chloride   101  104   Calcium   10.0  9.0   Albumin   5.00  4.40   Total Bilirubin   0.4  0.4   Alkaline Phosphatase   72  68   AST (SGOT)   16  15   ALT (SGPT)   9  13   WBC 7.59   4.53    Hemoglobin 15.7   13.7    Hematocrit 46.6   42.1    Platelets 391   440    Total Cholesterol  222 (A)      Triglycerides  149      HDL Cholesterol  54      LDL " Cholesterol   141 (A)      (A) Abnormal value                      Assessment and Plan   Diagnoses and all orders for this visit:    1. Breast pain, right (Primary)    2. History of bilateral breast implants    3. Nicotine dependence with current use    Other orders  -     cyclobenzaprine (FLEXERIL) 10 MG tablet; Take 1 tablet by mouth 2 (Two) Times a Day As Needed for Muscle Spasms.  Dispense: 60 tablet; Refill: 1  -     diclofenac (VOLTAREN) 50 MG EC tablet; Take 1 tablet by mouth 2 (Two) Times a Day.  Dispense: 60 tablet; Refill: 1    Consulted plastic surgery.  Patient needs a prior authorization due to .    Discussed nicotine dependence and smoking cessation.    Seda BALLARD Edvin Simmons  reports that she has been smoking cigarettes and cigarettes. She has a 20.00 pack-year smoking history. She has never used smokeless tobacco.. I have educated her on the risk of diseases from using tobacco products such as cancer, COPD and heart disease.     I advised her to quit and she is not willing to quit.    I spent 3  minutes counseling the patient.                Follow Up   No follow-ups on file.  Patient was given instructions and counseling regarding her condition or for health maintenance advice. Please see specific information pulled into the AVS if appropriate.

## 2022-07-07 ENCOUNTER — TELEPHONE (OUTPATIENT)
Dept: FAMILY MEDICINE CLINIC | Facility: CLINIC | Age: 37
End: 2022-07-07

## 2022-07-07 NOTE — TELEPHONE ENCOUNTER
----- Message from Seda Simmons sent at 7/7/2022  8:52 AM EDT -----  Regarding: Referral correction   This is the listed one they have as replacing her on the [facility] site:  Arjun Humphrey MD, PHD

## 2022-07-07 NOTE — TELEPHONE ENCOUNTER
I called and spoke with patient to verify which office she wanted to go too and I updated that in her plastic surgery referral, patient aware

## 2022-07-11 NOTE — PROGRESS NOTES
Chief Complaint  Follow-up (Right breast pain )    Subjective          History of Present Illness  Seda Simmons is a 37 y.o. female who presents to [facility] for Postoperative Follow-Up of breast recon one year ago, right breast pocket was made and bilateral implants were put in. Came in today for pain in right breast from working outside, she says she might have pulled something, heard a pop and felt pain. Implant is migrating down some. Had a CT of chest that was negative for acute findings.    Reviewed CT.   Allergies: Bee venom, Latex, Metronidazole, Omeprazole, and Paroxetine  Allergies Reconciled.    Review of Systems   All review of system has been reviewed and it  is negative except the ones note above.     Objective     /81 (BP Location: Left arm, Patient Position: Sitting)   Pulse 88     There is no height or weight on file to calculate BMI.    Physical Exam   Cardiovascular: Normal rate.     Pulmonary/Chest  Effort normal.     Breast: Incision healed well, no swelling, no erythema, no drainage, implants positioned inferiorly, right breast scar with some hypertrophy, tenderness over medial portion of scar    Result Review :{Labs  Result Review  Imaging  Med Tab  Media :23}                Assessment and Plan      Diagnoses and all orders for this visit:    1. Nicotine dependence with current use (Primary)    2. Breast pain    3. History of reconstruction of right breast        Plan:  • Heat and compression, monitor for any other symptoms, rtc 3 months to discuss revision and possibly will reposition implants.  Will plan for 2 hours, breast recon revision with repositioning of implants and mesh, scar revision      Follow Up     No follow-ups on file.    Patient was given instructions and counseling regarding her condition. Please see specific information pulled into the AVS if appropriate.     Sabra Orozco, APRN  07/18/2022

## 2022-07-14 ENCOUNTER — TELEPHONE (OUTPATIENT)
Dept: PSYCHIATRY | Facility: CLINIC | Age: 37
End: 2022-07-14

## 2022-07-14 NOTE — TELEPHONE ENCOUNTER
Pt left voicemail requesting an earlier appt with provider. She explained that she has been going through some changes in her life recently and would like to be seen sooner if possible. Please review and advise.

## 2022-07-15 ENCOUNTER — OFFICE VISIT (OUTPATIENT)
Dept: PSYCHIATRY | Facility: CLINIC | Age: 37
End: 2022-07-15

## 2022-07-15 VITALS
SYSTOLIC BLOOD PRESSURE: 121 MMHG | BODY MASS INDEX: 18.19 KG/M2 | DIASTOLIC BLOOD PRESSURE: 79 MMHG | HEIGHT: 68 IN | WEIGHT: 120 LBS

## 2022-07-15 DIAGNOSIS — F41.0 PANIC ATTACKS: ICD-10-CM

## 2022-07-15 DIAGNOSIS — F51.05 INSOMNIA DUE TO MENTAL CONDITION: ICD-10-CM

## 2022-07-15 DIAGNOSIS — F41.1 GENERALIZED ANXIETY DISORDER: Primary | ICD-10-CM

## 2022-07-15 DIAGNOSIS — S06.9X9D TRAUMATIC BRAIN INJURY WITH LOSS OF CONSCIOUSNESS, SUBSEQUENT ENCOUNTER: ICD-10-CM

## 2022-07-15 DIAGNOSIS — F33.1 MAJOR DEPRESSIVE DISORDER, RECURRENT EPISODE, MODERATE: ICD-10-CM

## 2022-07-15 DIAGNOSIS — F43.10 POST TRAUMATIC STRESS DISORDER (PTSD): ICD-10-CM

## 2022-07-15 DIAGNOSIS — F90.0 ADHD (ATTENTION DEFICIT HYPERACTIVITY DISORDER), INATTENTIVE TYPE: ICD-10-CM

## 2022-07-15 PROCEDURE — 99214 OFFICE O/P EST MOD 30 MIN: CPT | Performed by: STUDENT IN AN ORGANIZED HEALTH CARE EDUCATION/TRAINING PROGRAM

## 2022-07-15 PROCEDURE — 90833 PSYTX W PT W E/M 30 MIN: CPT | Performed by: STUDENT IN AN ORGANIZED HEALTH CARE EDUCATION/TRAINING PROGRAM

## 2022-07-15 RX ORDER — QUETIAPINE FUMARATE 50 MG/1
50 TABLET, FILM COATED ORAL NIGHTLY
Qty: 30 TABLET | Refills: 2 | Status: SHIPPED | OUTPATIENT
Start: 2022-07-15 | End: 2022-08-16 | Stop reason: SDUPTHER

## 2022-07-15 NOTE — PATIENT INSTRUCTIONS
1.  Please return to clinic at your next scheduled visit.  Contact the clinic ([phone]) at least 24 hours prior in the event you need to cancel.  2.  Do no harm to yourself or others.    3.  Avoid alcohol and drugs.    4.  Take all medications as prescribed.  Please contact the clinic with any concerns. If you are in need of medication refills, please call the clinic at [phone].    5. Should you want to get in touch with your provider, Dr. Sarita Schultz, please utilize go[employer]atch or contact the office ([phone]), and staff will be able to page Dr. Schultz directly.  6.  In the event you have personal crisis, contact the following crisis numbers: Suicide Prevention Hotline 1-350.742.5311; [facility] Helpline 6-816-749-BLRW; [facility] Emergency Room [phone]; text HELLO to 810704; or 962.     SPECIFIC RECOMMENDATIONS:     1.      Medications discussed at this encounter:                   - start seroquel     2.      Psychotherapy recommendations:      3.     Return to clinic: 4 weeks

## 2022-07-15 NOTE — TREATMENT PLAN
Multi-Disciplinary Problems (from Behavioral Health Treatment Plan)    Active Problems     Problem: Anxiety  Start Date: 07/15/22    Problem Details: The patient self-scales this problem as a 8 with 10 being the worst.        Goal Priority Start Date Expected End Date End Date    Patient will develop and implement behavioral and cognitive strategies to reduce anxiety and irrational fears. -- 07/15/22 -- --    Goal Details: Progress toward goal:  Not appropriate to rate progress toward goal since this is the initial treatment plan.        Goal Intervention Frequency Start Date End Date    Help patient explore past emotional issues in relation to present anxiety. Q Month 07/15/22 --    Intervention Details: Duration of treatment until until remission of symptoms.        Goal Intervention Frequency Start Date End Date    Help patient develop an awareness of their cognitive and physical responses to anxiety. Q Month 07/15/22 --    Intervention Details: Duration of treatment until until remission of symptoms.              Problem: Depression  Start Date: 07/15/22    Problem Details: The patient self-scales this problem as a 2 with 10 being the worst.        Goal Priority Start Date Expected End Date End Date    Patient will demonstrate the ability to initiate new constructive life skills outside of sessions on a consistent basis. -- 07/15/22 -- --    Goal Details: Progress toward goal:  Not appropriate to rate progress toward goal since this is the initial treatment plan.        Goal Intervention Frequency Start Date End Date    Assist patient in setting attainable activities of daily living goals. PRN 07/15/22 --    Goal Intervention Frequency Start Date End Date    Provide education about depression Q Month 07/15/22 --    Intervention Details: Duration of treatment until until remission of symptoms.        Goal Intervention Frequency Start Date End Date    Assist patient in developing healthy coping strategies. Q Month  07/15/22 --    Intervention Details: Duration of treatment until until remission of symptoms.                    Reviewed By     Sarita Schultz MD 07/15/22 [id number]                 I have discussed and reviewed this treatment plan with the patient.

## 2022-07-15 NOTE — PROGRESS NOTES
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 and emotional regulation. Allowed patient to freely discuss issues without interruption or judgement with unconditional positive regard, active listening skills, and empathy. Provided a safe, confidential environment to facilitate the development of a positive therapeutic relationship and encourage open, honest communication. Assisted patient in identifying risk factors which would indicate the need for higher level of care including thoughts to harm self or others and/or self-harming behavior and encouraged patient to contact this office, call 911, or present to the nearest emergency room should any of these events occur. Assisted patient in processing session content; acknowledged and normalized patient’s thoughts, feelings, and concerns by utilizing a person-centered approach in efforts to build appropriate rapport and a positive therapeutic relationship with open and honest communication. Patient given education on medication side effects, diagnosis/illness and relapse symptoms. Plan to continue supportive psychotherapy in next appointment to provide symptom relief.  Diagnoses: as above  Symptoms: as above  Functional status: good  Mental Status Exam: as above    Treatment plan: Medication management and supportive psychotherapy  Prognosis: good  Progress: better  2 mos      4/28:  Already on propranolol.  Tolerating the Depakote which is helping.  Start Wellbutrin to target depression, anxiety, ADHD.  Patient smokes cannabis so we cannot start a stimulant. 10 minutes of supportive psychotherapy with goal to strengthen defenses, promote problems solving, restore adaptive functioning and provide symptom relief. The therapeutic alliance was strengthened to encourage the patient to express their thoughts and feelings. Esteem building was enhanced through praise, reassurance, normalizing and encouragement. Coping skills were enhanced to build distress tolerance skills and emotional regulation. Patient given education on medication side effects, diagnosis/illness and relapse symptoms. Plan to continue supportive psychotherapy in next appointment to provide symptom relief.  Diagnoses: as above  Symptoms: as above  Functional status: Good  Mental Status Exam: as above    Treatment plan: Medication management and supportive psychotherapy  Prognosis: Good  Progress: Some, significant  7 weeks    3/3: Start therapy.  TBI, ADHD, PTSD, Panic disorder. R/O bipolar. Has never been on a stimulant. Start depakote at night. Consider klonipin, prazosin, stimulant. 15 minutes of supportive psychotherapy with goal to strengthen defenses, promote problems solving, restore adaptive functioning and provide symptom relief. The therapeutic alliance was strengthened to encourage the patient to express their thoughts and feelings. Esteem building was enhanced through praise, reassurance, normalizing and encouragement. Coping skills were enhanced to build distress tolerance skills and emotional regulation. Patient given education on medication side effects, diagnosis/illness and relapse symptoms. Plan to continue supportive psychotherapy in next appointment to provide symptom relief.  6 wks      PLAN:  37. Safety: No acute safety concerns  38. Therapy: Referral Made  39. Risk Assessment: Risk of self-harm acutely is  moderate.  Risk factors include anxiety disorder, mood disorder, PTSD, AODA, access to weapons, and recent psychosocial stressors (pandemic). Protective factors include no family history, no present SI, no history of suicide attempts or self-harm in the past, healthcare seeking, future orientation, willingness to engage in care.  Risk of self-harm chronically is also moderate, but could be further elevated in the event of treatment noncompliance and/or AODA.  40. Meds:  a. AGREE with propranolol.  b. CONTINUE Depakote 500 mg p.o. nightly. Risks, benefits, alternatives discussed with patient including nausea and vomiting, GI upset, sedation, dizziness/falls risk, increased appetite. After discussion of these risks and benefits, the patient voiced understanding and agreed to proceed. Patient also informed of the need to take as prescribed, and for periodic labwork.  c. START seroquel 50 mg nightly. Start at 25 mg nightly. Risks, benefits, alternatives discussed with patient including nausea and vomiting, GI upset, sedation, dizziness, falls, akathisia, hypotension, increased appetite, lowering of seizure threshold, theoretical risk of tardive dyskinesia, extrapyramidal symptoms, restless legs syndrome. After discussion of these risks and benefits, the patient voiced understanding and agreed to proceed.  d. STOP Wellbutrin  mg daily. Risks, benefits, alternatives discussed with patient including nausea, GI upset, increased energy, exacerbation of irritability, insomnia, lowering of seizure threshold.  After discussion of these risks and benefits, the patient voiced understanding and agreed to proceed.  41. Labs: Needs a urine drug screen soon  42. Follow up: 4 weeks    Patient screened positive for depression based on a PHQ-9 score of  on . Follow-up recommendations include: Suicide Risk Assessment performed.           TREATMENT PLAN/GOALS: Continue supportive psychotherapy efforts and medications as indicated.  Treatment and medication options discussed during today's visit. Patient acknowledged and verbally consented to continue with current treatment plan and was educated on the importance of compliance with treatment and follow-up appointments.    MEDICATION ISSUES:  MAURA reviewed as expected.  Discussed medication options and treatment plan of prescribed medication as well as the risks, benefits, and side effects including potential falls, possible impaired driving and metabolic adversities among others. Patient is agreeable to call the office with any worsening of symptoms or onset of side effects. Patient is agreeable to call 911 or go to the nearest ER should he/she begin having SI/HI. No medication side effects or related complaints today.     MEDS ORDERED DURING VISIT:  New Medications Ordered This Visit   Medications   • QUEtiapine (SEROquel) 50 MG tablet     Sig: Take 1 tablet by mouth Every Night.     Dispense:  30 tablet     Refill:  2       Return in about 4 weeks (around 8/12/2022).         This document has been electronically signed by Sarita Schultz MD  July 15, 2022 14:40 EDT      Part of this note may be an electronic transcription/translation of spoken language to printed text using the Dragon Dictation System.

## 2022-07-18 ENCOUNTER — OFFICE VISIT (OUTPATIENT)
Dept: PLASTIC SURGERY | Facility: CLINIC | Age: 37
End: 2022-07-18

## 2022-07-18 VITALS — HEART RATE: 88 BPM | SYSTOLIC BLOOD PRESSURE: 115 MMHG | DIASTOLIC BLOOD PRESSURE: 81 MMHG

## 2022-07-18 DIAGNOSIS — Z98.890 HISTORY OF RECONSTRUCTION OF RIGHT BREAST: ICD-10-CM

## 2022-07-18 DIAGNOSIS — F17.200 NICOTINE DEPENDENCE WITH CURRENT USE: Primary | ICD-10-CM

## 2022-07-18 DIAGNOSIS — N64.4 BREAST PAIN: ICD-10-CM

## 2022-07-18 PROCEDURE — 99212 OFFICE O/P EST SF 10 MIN: CPT | Performed by: NURSE PRACTITIONER

## 2022-07-19 ENCOUNTER — TELEPHONE (OUTPATIENT)
Dept: PSYCHIATRY | Facility: CLINIC | Age: 37
End: 2022-07-19

## 2022-07-19 NOTE — TELEPHONE ENCOUNTER
PATIENT WAS REFERRED TO [facility] IN COUNSELING ON 03/03/2022, CALLED PATIENT TO FOLLOW UP ON REFERRAL PATIENT STATES SHE WAS NEVER ABLE TO GET IN CONTACT WITH THEM TO SCHEDULE APPOINTMENT.  PATIENT REQUEST THAT REFERRAL BE CLOSED UNTIL SHE IS READY TO SCHEDULE FOR THERAPY.

## 2022-08-02 ENCOUNTER — APPOINTMENT (OUTPATIENT)
Dept: GENERAL RADIOLOGY | Facility: HOSPITAL | Age: 37
End: 2022-08-02

## 2022-08-02 ENCOUNTER — HOSPITAL ENCOUNTER (EMERGENCY)
Facility: HOSPITAL | Age: 37
Discharge: HOME | End: 2022-08-02
Attending: EMERGENCY MEDICINE | Admitting: EMERGENCY MEDICINE

## 2022-08-02 ENCOUNTER — TELEPHONE (OUTPATIENT)
Dept: FAMILY MEDICINE CLINIC | Facility: CLINIC | Age: 37
End: 2022-08-02

## 2022-08-02 VITALS
HEIGHT: 68 IN | DIASTOLIC BLOOD PRESSURE: 73 MMHG | BODY MASS INDEX: 17.28 KG/M2 | RESPIRATION RATE: 20 BRPM | OXYGEN SATURATION: 98 % | HEART RATE: 73 BPM | WEIGHT: 114 LBS | SYSTOLIC BLOOD PRESSURE: 110 MMHG | TEMPERATURE: 97.7 F

## 2022-08-02 DIAGNOSIS — Z90.81 HISTORY OF SPLENECTOMY: ICD-10-CM

## 2022-08-02 DIAGNOSIS — J20.8 ACUTE BACTERIAL BRONCHITIS: ICD-10-CM

## 2022-08-02 DIAGNOSIS — U07.1 COVID-19: Primary | ICD-10-CM

## 2022-08-02 DIAGNOSIS — B96.89 ACUTE BACTERIAL BRONCHITIS: ICD-10-CM

## 2022-08-02 LAB
ALBUMIN SERPL-MCNC: 4.3 G/DL (ref 3.5–5.2)
ALBUMIN/GLOB SERPL: 1.7 G/DL
ALP SERPL-CCNC: 59 U/L (ref 39–117)
ALT SERPL W P-5'-P-CCNC: 9 U/L (ref 1–33)
ANION GAP SERPL CALCULATED.3IONS-SCNC: 9.1 MMOL/L (ref 5–15)
AST SERPL-CCNC: 13 U/L (ref 1–32)
BASOPHILS # BLD AUTO: 0.01 10*3/MM3 (ref 0–0.2)
BASOPHILS NFR BLD AUTO: 0.1 % (ref 0–1.5)
BILIRUB SERPL-MCNC: 0.3 MG/DL (ref 0–1.2)
BUN SERPL-MCNC: 14 MG/DL (ref 6–20)
BUN/CREAT SERPL: 18.2 (ref 7–25)
CALCIUM SPEC-SCNC: 9.1 MG/DL (ref 8.6–10.5)
CHLORIDE SERPL-SCNC: 104 MMOL/L (ref 98–107)
CO2 SERPL-SCNC: 27.9 MMOL/L (ref 22–29)
CREAT SERPL-MCNC: 0.77 MG/DL (ref 0.57–1)
D DIMER PPP FEU-MCNC: 0.34 MCGFEU/ML (ref 0–0.57)
DEPRECATED RDW RBC AUTO: 48.7 FL (ref 37–54)
EGFRCR SERPLBLD CKD-EPI 2021: 102 ML/MIN/1.73
EOSINOPHIL # BLD AUTO: 0.18 10*3/MM3 (ref 0–0.4)
EOSINOPHIL NFR BLD AUTO: 2.2 % (ref 0.3–6.2)
ERYTHROCYTE [DISTWIDTH] IN BLOOD BY AUTOMATED COUNT: 14.6 % (ref 12.3–15.4)
GLOBULIN UR ELPH-MCNC: 2.6 GM/DL
GLUCOSE SERPL-MCNC: 84 MG/DL (ref 65–99)
HCT VFR BLD AUTO: 39.3 % (ref 34–46.6)
HGB BLD-MCNC: 13.2 G/DL (ref 12–15.9)
IMM GRANULOCYTES # BLD AUTO: 0.02 10*3/MM3 (ref 0–0.05)
IMM GRANULOCYTES NFR BLD AUTO: 0.2 % (ref 0–0.5)
LYMPHOCYTES # BLD AUTO: 2.91 10*3/MM3 (ref 0.7–3.1)
LYMPHOCYTES NFR BLD AUTO: 35.9 % (ref 19.6–45.3)
MCH RBC QN AUTO: 30.3 PG (ref 26.6–33)
MCHC RBC AUTO-ENTMCNC: 33.6 G/DL (ref 31.5–35.7)
MCV RBC AUTO: 90.3 FL (ref 79–97)
MONOCYTES # BLD AUTO: 1.3 10*3/MM3 (ref 0.1–0.9)
MONOCYTES NFR BLD AUTO: 16 % (ref 5–12)
NEUTROPHILS NFR BLD AUTO: 3.68 10*3/MM3 (ref 1.7–7)
NEUTROPHILS NFR BLD AUTO: 45.6 % (ref 42.7–76)
NRBC BLD AUTO-RTO: 0 /100 WBC (ref 0–0.2)
NT-PROBNP SERPL-MCNC: 41.1 PG/ML (ref 0–450)
PLATELET # BLD AUTO: 293 10*3/MM3 (ref 140–450)
PMV BLD AUTO: 9 FL (ref 6–12)
POTASSIUM SERPL-SCNC: 4.4 MMOL/L (ref 3.5–5.2)
PROT SERPL-MCNC: 6.9 G/DL (ref 6–8.5)
RBC # BLD AUTO: 4.35 10*6/MM3 (ref 3.77–5.28)
SODIUM SERPL-SCNC: 141 MMOL/L (ref 136–145)
TROPONIN T SERPL-MCNC: <0.01 NG/ML (ref 0–0.03)
WBC NRBC COR # BLD: 8.1 10*3/MM3 (ref 3.4–10.8)

## 2022-08-02 PROCEDURE — 85025 COMPLETE CBC W/AUTO DIFF WBC: CPT | Performed by: NURSE PRACTITIONER

## 2022-08-02 PROCEDURE — 93005 ELECTROCARDIOGRAM TRACING: CPT | Performed by: NURSE PRACTITIONER

## 2022-08-02 PROCEDURE — 99282 EMERGENCY DEPT VISIT SF MDM: CPT

## 2022-08-02 PROCEDURE — 84484 ASSAY OF TROPONIN QUANT: CPT | Performed by: NURSE PRACTITIONER

## 2022-08-02 PROCEDURE — 71046 X-RAY EXAM CHEST 2 VIEWS: CPT

## 2022-08-02 PROCEDURE — 85379 FIBRIN DEGRADATION QUANT: CPT | Performed by: NURSE PRACTITIONER

## 2022-08-02 PROCEDURE — 83880 ASSAY OF NATRIURETIC PEPTIDE: CPT | Performed by: NURSE PRACTITIONER

## 2022-08-02 PROCEDURE — 80053 COMPREHEN METABOLIC PANEL: CPT | Performed by: NURSE PRACTITIONER

## 2022-08-02 PROCEDURE — 36415 COLL VENOUS BLD VENIPUNCTURE: CPT | Performed by: NURSE PRACTITIONER

## 2022-08-02 RX ORDER — AMOXICILLIN AND CLAVULANATE POTASSIUM 875; 125 MG/1; MG/1
1 TABLET, FILM COATED ORAL EVERY 12 HOURS
Qty: 14 TABLET | Refills: 0 | Status: SHIPPED | OUTPATIENT
Start: 2022-08-02 | End: 2022-08-12

## 2022-08-02 RX ORDER — BROMPHENIRAMINE MALEATE, PSEUDOEPHEDRINE HYDROCHLORIDE, AND DEXTROMETHORPHAN HYDROBROMIDE 2; 30; 10 MG/5ML; MG/5ML; MG/5ML
10 SYRUP ORAL 4 TIMES DAILY PRN
Qty: 160 ML | Refills: 0 | Status: SHIPPED | OUTPATIENT
Start: 2022-08-02 | End: 2022-08-12

## 2022-08-02 NOTE — TELEPHONE ENCOUNTER
Caller: Seda Tapia    Relationship to patient: Self    Best call back number: [phone]  Chief complaint:N/A    Patient directed to call 911 or go to their nearest emergency room.     Patient verbalized understanding: [x] Yes  [] No  If no, why?    Additional notes:PATIENT IS HAVING DIFFICULTY BREATHING EVEN AT REST. I INSTRUCTED HER TO GO TO THE ER. SHE HAS HAD COVID AND WAS TESTED POSITIVE ON THE 24TH AND CONTINUED TO TEST POSITIVE SINCE THEN AND NOW HAVING DIFFICULTY BREATHING.

## 2022-08-02 NOTE — DISCHARGE INSTRUCTIONS
Your oxygen level and blood work and EKG and chest x-ray came back normal today.    No signs of a heart attack or blood clot or anything serious or life-threatening were found.    However, with the change in your sputum color and worsening symptoms as well as history of splenectomy, and abnormal lung sounds on exam, you are prescribed some antibiotics to cover for any bacterial infection following COVID-19.

## 2022-08-02 NOTE — ED PROVIDER NOTES
"Time: 6:56 PM EDT  Arrived by: private car  Chief Complaint: shortness of breath, dizziness  History provided by: patient  History is limited by: N/A     History of Present Illness:  Patient is a 37 y.o. year old female who presents to the emergency department with shortness of breath and dizziness.     Pt states that she tested positive for COVID-19 on 7/24/2022 and has associated shortness of breath and a cough. Pt states that before she tested positive she was feeling sick for a couple of days. Pt denies being on any cough medicine. Pt denies having a history of asthma.    Similar Symptoms Previously: no  Recently seen: no      Patient Care Team  Primary Care Provider: Bernard Freitas APRN    Past Medical History:     Allergies   Allergen Reactions   • Bee Venom Shortness Of Breath and Swelling   • Latex Itching, Swelling and Rash     Swelling at site, rash and itching    • Metronidazole Rash   • Omeprazole Other (See Comments)     Causes thrush.   • Paroxetine Other (See Comments)     Severe migraine      Past Medical History:   Diagnosis Date   • ADHD (attention deficit hyperactivity disorder) 2000    Never treated due to anxiety being \"worse\"   • Anemia    • Anxiety    • Arthritis    • Chronic pain disorder 2003    Back issues following childbirth,  2010 was crushed   • Colon polyp    • Condition not found 09/04/2015    left gluteus medius insufficiency   • Depression    • Disease of thyroid gland 2020    Hyperthyroidism,enlarged,moderate nodules   • Family history of malignant neoplasm in father 07/02/2021    Added automatically from request for surgery [id number]   • GERD (gastroesophageal reflux disease)    • Head injury 07/02/2021   • Hemorrhoids    • History of MRSA infection     2010- WOUND COLINIZED   • HL (hearing loss)    • Irritable bowel syndrome    • Kidney stone    • Lactose intolerance    • Migraine headache    • Obsessive-compulsive disorder 2000   • Panic disorder 2000   • PONV (postoperative " nausea and vomiting)     slight nausea   • Psychiatric illness 2000   • PTSD (post-traumatic stress disorder) 2000   • Spinal headache     post epidural post child birth   • Tobacco use 02/08/2022   • Trace mitral valve regurgitation 02/08/2022   • Withdrawal symptoms, drug or narcotic (HCC) 2011    From pain medication following getting crushed and hospitali     Past Surgical History:   Procedure Laterality Date   • ABDOMINAL SURGERY  2004; 2010    Laproscopic; splenectomy and multiple organ fixations   • BONY PELVIS SURGERY     • BREAST AUGMENTATION  01/08/2020   • BREAST SURGERY  2010; 2020    Trauma/removal of breast; 2 reconstructive surgeries   • COLONOSCOPY N/A 09/01/2021    Procedure: COLONOSCOPY;  Surgeon: Haroon Collins MD;  Location: [facility] ENDOSCOPY;  Service: Gastroenterology;  Laterality: N/A;  NORMAL COLONOSCOPY   • COSMETIC SURGERY  2020    2 reconstructive breast surheries w implants   • DILATION AND CURETTAGE, DIAGNOSTIC / THERAPEUTIC  2004   • ENDOSCOPY  2007 2015   • ENDOSCOPY N/A 09/01/2021    Procedure: ESOPHAGOGASTRODUODENOSCOPY WITH BIOPSY;  Surgeon: Haroon Collins MD;  Location: [facility] ENDOSCOPY;  Service: Gastroenterology;  Laterality: N/A;  HIATAL HERNIA   • FAT GRAFTING  01/08/2020    fat grafting to right breast    • FRACTURE SURGERY  1990; 2010    Arm; hips, leg, wrist   • HARDWARE REMOVAL  2011 2014   • HEMORRHOIDECTOMY  2014    During childbirth   • HIP ORIF W/ CAPSULOTOMY     • LUNG SURGERY     • OTHER SURGICAL HISTORY      metal implants   • SPLENECTOMY  2010   • UPPER GASTROINTESTINAL ENDOSCOPY     • WRIST SURGERY  2020     Family History   Problem Relation Age of Onset   • Heart disease Mother    • Arthritis Mother    • Anxiety disorder Mother    • Bipolar disorder Mother    • Depression Mother    • Colon cancer Father    • Cancer Father    • Arthritis Father    • Osteoporosis Father    • Heart disease Father    • Bipolar disorder Father    • Depression Father    •  Colon polyps Father    • Bipolar disorder Sister    • Depression Sister    • Self-Injurious Behavior  Sister    • Hypertension Brother    • Anxiety disorder Brother    • Depression Brother    • Irritable bowel syndrome Brother    • Hypertension Brother    • Depression Brother    • Lung cancer Other    • Clotting disorder Other    • Diabetes Other    • Uterine cancer Other    • Irritable bowel syndrome Maternal Grandmother    • Colon cancer Paternal Grandmother    • Malig Hyperthermia Neg Hx        Home Medications:  Prior to Admission medications    Medication Sig Start Date End Date Taking? Authorizing Provider   cyclobenzaprine (FLEXERIL) 10 MG tablet Take 1 tablet by mouth 2 (Two) Times a Day As Needed for Muscle Spasms. 7/5/22   Bernard Freitas APRN   diclofenac (VOLTAREN) 50 MG EC tablet Take 1 tablet by mouth 2 (Two) Times a Day. 7/5/22   Bernard Freitas APRN   divalproex (DEPAKOTE ER) 500 MG 24 hr tablet Take 1 tablet by mouth Daily. 6/16/22   Sarita Schultz MD   hyoscyamine (ANASPAZ,LEVSIN) 0.125 MG tablet Take 1 tablet by mouth Every 6 (Six) Hours As Needed for Cramping. 5/2/22   Soha Bernard APRN   propranolol (INDERAL) 20 MG tablet Take 1 tablet by mouth 3 (Three) Times a Day. 1/5/22   Bernard Freitas APRN   QUEtiapine (SEROquel) 50 MG tablet Take 1 tablet by mouth Every Night. 7/15/22   Sarita Schultz MD   rizatriptan (MAXALT) 10 MG tablet Take 10 mg by mouth. 1/27/22   Provider, MD Tyshawn   vitamin D (ERGOCALCIFEROL) 1.25 MG (10098 UT) capsule capsule Take 1 capsule by mouth 1 (One) Time Per Week. 4/6/22   Bernard Freitas APRN        Social History:   Social History     Tobacco Use   • Smoking status: Current Every Day Smoker     Packs/day: 1.00     Years: 20.00     Pack years: 20.00     Types: Cigarettes, Cigarettes   • Smokeless tobacco: Never Used   • Tobacco comment: I've temporarily quit several times but always end up going back to smoking   Vaping Use   • Vaping Use: Former   •  "Substances: Nicotine, CBD, Flavoring   • Devices: Pre-filled pod   Substance Use Topics   • Alcohol use: Yes     Comment: Socially, very occasionally   • Drug use: Never     Recent travel: not applicable     Review of Systems:  Review of Systems   Constitutional: Negative for chills and fever.   HENT: Negative for congestion, ear pain and sore throat.    Eyes: Negative for pain.   Respiratory: Positive for cough and shortness of breath. Negative for chest tightness.    Cardiovascular: Negative for chest pain.   Gastrointestinal: Negative for abdominal pain, diarrhea, nausea and vomiting.   Genitourinary: Negative for flank pain and hematuria.   Musculoskeletal: Negative for joint swelling.   Skin: Negative for pallor.   Neurological: Negative for seizures and headaches.   All other systems reviewed and are negative.       Physical Exam:  /73 (BP Location: Right arm, Patient Position: Sitting)   Pulse 73   Temp 97.7 °F (36.5 °C) (Oral)   Resp 20   Ht 172.7 cm (68\")   Wt 51.7 kg (114 lb)   LMP 07/14/2022   SpO2 98%   BMI 17.33 kg/m²     Physical Exam  Vitals and nursing note reviewed.   Constitutional:       General: She is not in acute distress.     Appearance: Normal appearance. She is not toxic-appearing.   HENT:      Head: Normocephalic and atraumatic.      Mouth/Throat:      Mouth: Mucous membranes are dry.      Comments: Mucosa membranes mildly dry  Eyes:      General: No scleral icterus.  Cardiovascular:      Rate and Rhythm: Normal rate and regular rhythm.      Pulses: Normal pulses.      Heart sounds: Normal heart sounds.   Pulmonary:      Effort: Pulmonary effort is normal. No respiratory distress.      Breath sounds: Rhonchi present. No wheezing.      Comments:   Frequent coughing episodes  Rhonchi present in the base of the right lung    Abdominal:      General: Abdomen is flat.      Palpations: Abdomen is soft.      Tenderness: There is no abdominal tenderness.   Musculoskeletal:         " General: Normal range of motion.      Cervical back: Normal range of motion and neck supple.   Skin:     General: Skin is warm and dry.   Neurological:      Mental Status: She is alert and oriented to person, place, and time. Mental status is at baseline.                Medications in the Emergency Department:  Medications - No data to display     Labs  Lab Results (last 24 hours)     Procedure Component Value Units Date/Time    CBC & Differential [[id number]]  (Abnormal) Collected: 08/02/22 1557    Specimen: Blood Updated: 08/02/22 1608    Narrative:      The following orders were created for panel order CBC & Differential.  Procedure                               Abnormality         Status                     ---------                               -----------         ------                     CBC Auto Differential[[id number]]        Abnormal            Final result                 Please view results for these tests on the individual orders.    Comprehensive Metabolic Panel [[id number]] Collected: 08/02/22 1557    Specimen: Blood Updated: 08/02/22 1630     Glucose 84 mg/dL      BUN 14 mg/dL      Creatinine 0.77 mg/dL      Sodium 141 mmol/L      Potassium 4.4 mmol/L      Chloride 104 mmol/L      CO2 27.9 mmol/L      Calcium 9.1 mg/dL      Total Protein 6.9 g/dL      Albumin 4.30 g/dL      ALT (SGPT) 9 U/L      AST (SGOT) 13 U/L      Alkaline Phosphatase 59 U/L      Total Bilirubin 0.3 mg/dL      Globulin 2.6 gm/dL      A/G Ratio 1.7 g/dL      BUN/Creatinine Ratio 18.2     Anion Gap 9.1 mmol/L      eGFR 102.0 mL/min/1.73      Comment: National Kidney Foundation and American Society of Nephrology (ASN) Task Force recommended calculation based on the Chronic Kidney Disease Epidemiology Collaboration (CKD-EPI) equation refit without adjustment for race.       Narrative:      GFR Normal >60  Chronic Kidney Disease <60  Kidney Failure <15      D-dimer, Quantitative [[id number]]  (Normal) Collected: 08/02/22 1557     Specimen: Blood Updated: 08/02/22 1624     D-Dimer, Quantitative 0.34 MCGFEU/mL     Narrative:      The Stago D-Dimer test used in conjunction with a clinical pretest probability (PTP) assessment model, has been approved by the FDA to rule out the presence of venous thromboembolism (VTE) in outpatients suspected of deep venous thrombosis (DVT) or pulmonary embolism (PE). The cut-off for negative predictive value is <0.50 MCGFEU/mL.    BNP [[id number]]  (Normal) Collected: 08/02/22 1557    Specimen: Blood Updated: 08/02/22 1643     proBNP 41.1 pg/mL     Narrative:      Among patients with dyspnea, NT-proBNP is highly sensitive for the detection of acute congestive heart failure. In addition NT-proBNP of <300 pg/ml effectively rules out acute congestive heart failure with 99% negative predictive value.    Results may be falsely decreased if patient taking Biotin.      Troponin [[id number]]  (Normal) Collected: 08/02/22 1557    Specimen: Blood Updated: 08/02/22 1643     Troponin T <0.010 ng/mL     Narrative:      Troponin T Reference Range:  <= 0.03 ng/mL-   Negative for AMI  >0.03 ng/mL-     Abnormal for myocardial necrosis.  Clinicians would have to utilize clinical acumen, EKG, Troponin and serial changes to determine if it is an Acute Myocardial Infarction or myocardial injury due to an underlying chronic condition.       Results may be falsely decreased if patient taking Biotin.      CBC Auto Differential [[id number]]  (Abnormal) Collected: 08/02/22 1557    Specimen: Blood Updated: 08/02/22 1608     WBC 8.10 10*3/mm3      RBC 4.35 10*6/mm3      Hemoglobin 13.2 g/dL      Hematocrit 39.3 %      MCV 90.3 fL      MCH 30.3 pg      MCHC 33.6 g/dL      RDW 14.6 %      RDW-SD 48.7 fl      MPV 9.0 fL      Platelets 293 10*3/mm3      Neutrophil % 45.6 %      Lymphocyte % 35.9 %      Monocyte % 16.0 %      Eosinophil % 2.2 %      Basophil % 0.1 %      Immature Grans % 0.2 %      Neutrophils, Absolute 3.68 10*3/mm3       Lymphocytes, Absolute 2.91 10*3/mm3      Monocytes, Absolute 1.30 10*3/mm3      Eosinophils, Absolute 0.18 10*3/mm3      Basophils, Absolute 0.01 10*3/mm3      Immature Grans, Absolute 0.02 10*3/mm3      nRBC 0.0 /100 WBC            Imaging:  XR Chest 2 View    Result Date: 8/2/2022  PROCEDURE: XR CHEST 2 VW  COMPARISON: [facility], CR, ABDOMEN SERIES ACUTE, 12/30/2015, 16:57.  [facility], CT, CT CHEST WO CONTRAST DIAGNOSTIC, 6/19/2022, 2:31.  [facility], CR, CHEST PA/AP & LAT 2V, 11/20/2010, 21:00.  INDICATIONS: soa, cough, Covid +  FINDINGS:   The lungs are well-expanded. The heart and pulmonary vasculature are within normal limits. No pleural effusions are identified. There are no active appearing infiltrates.  Bilateral breast implants are present.  IMPRESSION: No active disease.  ANNMARIE CADENA MD       Electronically Signed and Approved By: ANNMARIE CADENA MD on 8/02/2022 at 16:39               Procedures:  ECG 12 Lead      Date/Time: 8/2/2022 6:57 PM  Performed by: Ousmane Lyles MD  Authorized by: Ousmane Lyles MD   Interpreted by physician  Comparison: not compared with previous ECG   Rhythm: sinus rhythm  Rate: normal  BPM: 78  QRS axis: normal  ST Segments: ST segments normal  T Waves: T waves normal  Comments: No acute ischemia          Progress  ED Course as of 08/03/22 0317   Tue Aug 02, 2022   1555 --- PROVIDER IN TRIAGE NOTE ---    The patient was seen and evaluated by betito Leija in triage. Orders were placed and the patient is currently awaiting disposition. [KS]      ED Course User Index  [KS] Ivon Leija APRN                            Medical Decision Making:  MDM     For my differential diagnosis of this patient's chest pain I considered acute coronary syndrome, pulmonary embolism, musculoskeletal chest wall pain, acid reflux with esophagitis, thoracic muscle strain, or anxiety induced chest pain.    Differential diagnosis for this  patient with dyspnea includes asthma or COPD exacerbation, CHF exacerbation, bronchitis or pneumonia, pneumothorax, pleural effusion or pulmonary edema, or less likely MI or PE.          This patient is a pleasant 37-year-old female with previous history of lung issues and splenectomy, who recently was diagnosed with COVID-19, and initially was getting better but now starting to have worsening dyspnea and cough productive of yellowish-green sputum the last few days.    Are initial work-up here is unremarkable and reassuring, including normal white blood cell count, negative troponin and D-dimer, normal chest x-ray.    She is oxygenating well on room air but she has some abnormal rhonchorous breath sounds in the right lung base.    Again, chest x-ray normal and no sign of infiltrate.    However given her change in sputum character and worsening symptoms and abnormal lung findings on auscultation coupled with history of splenectomy I will err on the side of caution and prescribe an antibiotic course to cover for possible bacterial superinfection.    She will also be given prescription for cough and decongestant medication and follow-up with PCP.      Final diagnoses:   COVID-19   Acute bacterial bronchitis   History of splenectomy        Disposition:  ED Disposition     ED Disposition   Discharge    Condition   Stable    Comment   --             This medical record created using voice recognition software.           Trevor Levine  08/02/22 1907       Trevor Levine  08/02/22 1908       Trevor Levine  08/02/22 1910       Ousmane Lyles MD  08/03/22 [id number]

## 2022-08-03 NOTE — TELEPHONE ENCOUNTER
Pt was contacted and it recommended to visit the ER this resulted in having ER visit to address breathing issues

## 2022-08-09 LAB — QT INTERVAL: 357 MS

## 2022-08-12 ENCOUNTER — OFFICE VISIT (OUTPATIENT)
Dept: FAMILY MEDICINE CLINIC | Facility: CLINIC | Age: 37
End: 2022-08-12

## 2022-08-12 VITALS
DIASTOLIC BLOOD PRESSURE: 68 MMHG | HEIGHT: 68 IN | WEIGHT: 116.8 LBS | OXYGEN SATURATION: 99 % | BODY MASS INDEX: 17.7 KG/M2 | TEMPERATURE: 98.2 F | SYSTOLIC BLOOD PRESSURE: 88 MMHG | HEART RATE: 75 BPM

## 2022-08-12 DIAGNOSIS — M25.551 BILATERAL HIP PAIN: ICD-10-CM

## 2022-08-12 DIAGNOSIS — M25.552 BILATERAL HIP PAIN: ICD-10-CM

## 2022-08-12 DIAGNOSIS — M25.532 LEFT WRIST PAIN: Primary | ICD-10-CM

## 2022-08-12 PROCEDURE — 99213 OFFICE O/P EST LOW 20 MIN: CPT | Performed by: NURSE PRACTITIONER

## 2022-08-12 RX ORDER — PREDNISONE 20 MG/1
TABLET ORAL
COMMUNITY
Start: 2022-07-23 | End: 2022-08-12

## 2022-08-12 RX ORDER — CYCLOBENZAPRINE HCL 10 MG
10 TABLET ORAL 2 TIMES DAILY PRN
Qty: 60 TABLET | Refills: 1 | Status: SHIPPED | OUTPATIENT
Start: 2022-08-12 | End: 2023-01-16 | Stop reason: SDUPTHER

## 2022-08-12 RX ORDER — DIVALPROEX SODIUM 250 MG/1
TABLET, EXTENDED RELEASE ORAL
COMMUNITY
Start: 2022-07-14 | End: 2022-08-12 | Stop reason: SDUPTHER
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
## 2022-08-16 ENCOUNTER — LAB (OUTPATIENT)
Dept: LAB | Facility: HOSPITAL | Age: 37
End: 2022-08-16

## 2022-08-16 ENCOUNTER — OFFICE VISIT (OUTPATIENT)
Dept: PSYCHIATRY | Facility: CLINIC | Age: 37
End: 2022-08-16

## 2022-08-16 VITALS — WEIGHT: 122 LBS | BODY MASS INDEX: 18.49 KG/M2 | HEIGHT: 68 IN

## 2022-08-16 DIAGNOSIS — S06.9X9D TRAUMATIC BRAIN INJURY WITH LOSS OF CONSCIOUSNESS, SUBSEQUENT ENCOUNTER: Primary | ICD-10-CM

## 2022-08-16 DIAGNOSIS — F90.0 ADHD (ATTENTION DEFICIT HYPERACTIVITY DISORDER), INATTENTIVE TYPE: ICD-10-CM

## 2022-08-16 DIAGNOSIS — F41.0 PANIC ATTACKS: ICD-10-CM

## 2022-08-16 DIAGNOSIS — F43.10 POST TRAUMATIC STRESS DISORDER (PTSD): ICD-10-CM

## 2022-08-16 DIAGNOSIS — F33.1 MAJOR DEPRESSIVE DISORDER, RECURRENT EPISODE, MODERATE: ICD-10-CM

## 2022-08-16 DIAGNOSIS — F51.05 INSOMNIA DUE TO MENTAL CONDITION: ICD-10-CM

## 2022-08-16 DIAGNOSIS — F41.1 GENERALIZED ANXIETY DISORDER: ICD-10-CM

## 2022-08-16 LAB
AMPHET+METHAMPHET UR QL: NEGATIVE
BARBITURATES UR QL SCN: NEGATIVE
BENZODIAZ UR QL SCN: NEGATIVE
CANNABINOIDS SERPL QL: NEGATIVE
COCAINE UR QL: NEGATIVE
METHADONE UR QL SCN: NEGATIVE
OPIATES UR QL: NEGATIVE
OXYCODONE UR QL SCN: NEGATIVE

## 2022-08-16 PROCEDURE — 80307 DRUG TEST PRSMV CHEM ANLYZR: CPT

## 2022-08-16 PROCEDURE — 90833 PSYTX W PT W E/M 30 MIN: CPT | Performed by: STUDENT IN AN ORGANIZED HEALTH CARE EDUCATION/TRAINING PROGRAM

## 2022-08-16 PROCEDURE — 99214 OFFICE O/P EST MOD 30 MIN: CPT | Performed by: STUDENT IN AN ORGANIZED HEALTH CARE EDUCATION/TRAINING PROGRAM

## 2022-08-16 RX ORDER — QUETIAPINE FUMARATE 100 MG/1
100 TABLET, FILM COATED ORAL NIGHTLY
Qty: 30 TABLET | Refills: 2 | Status: SHIPPED | OUTPATIENT
Start: 2022-08-16 | End: 2022-09-27 | Stop reason: DRUGHIGH

## 2022-08-16 NOTE — PATIENT INSTRUCTIONS
1.  Please return to clinic at your next scheduled visit.  Contact the clinic ([phone]) at least 24 hours prior in the event you need to cancel.  2.  Do no harm to yourself or others.    3.  Avoid alcohol and drugs.    4.  Take all medications as prescribed.  Please contact the clinic with any concerns. If you are in need of medication refills, please call the clinic at [phone].    5. Should you want to get in touch with your provider, Dr. Sarita Schultz, please utilize [employer] or contact the office ([phone]), and staff will be able to page Dr. Schultz directly.  6.  In the event you have personal crisis, contact the following crisis numbers: Suicide Prevention Hotline 1-895.409.8619; [facility] Helpl[facility] 0-241-783-NPOM; [facility] Emergency Room [phone]; text HELLO to 447986; or 350.     SPECIFIC RECOMMENDATIONS:     1.      Medications discussed at this encounter:                   - Increase quetiapine    2.      Psychotherapy recommendations:      3.     Return to clinic: 6 weeks

## 2022-08-16 NOTE — PROGRESS NOTES
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 have her sign CSA in 6 wks). 17 minutes of supportive psychotherapy with goal to strengthen defenses, promote problems solving, restore adaptive functioning and provide symptom relief. The therapeutic alliance was strengthened to encourage the patient to express their thoughts and feelings. Esteem building was enhanced through praise, reassurance, normalizing and encouragement. Coping skills were enhanced to build distress tolerance skills and emotional regulation. Allowed patient to freely discuss issues without interruption or judgement with unconditional positive regard, active listening skills, and empathy. Provided a safe, confidential environment to facilitate the development of a positive therapeutic relationship and encourage open, honest communication. Assisted patient in identifying risk factors which would indicate the need for higher level of care including thoughts to harm self or others and/or self-harming behavior and encouraged patient to contact this office, call 911, or present to the nearest emergency room should any of these events occur. Assisted patient in processing session content; acknowledged and normalized patient’s thoughts, feelings, and concerns by utilizing a person-centered approach in efforts to build appropriate  rapport and a positive therapeutic relationship with open and honest communication. Patient given education on medication side effects, diagnosis/illness and relapse symptoms. Plan to continue supportive psychotherapy in next appointment to provide symptom relief.  Diagnoses: as above  Symptoms: as above  Functional status: good  Mental Status Exam: as above    Treatment plan: Medication management and supportive psychotherapy  Prognosis: good  Progress: improving  6 wks      7/15: Encouraged continued cessation of cannabis. Start seroquel. Suspicion of bipolar. Hyperverbal, not sleeping well. 17 minutes of supportive psychotherapy with goal to strengthen defenses, promote problems solving, restore adaptive functioning and provide symptom relief. The therapeutic alliance was strengthened to encourage the patient to express their thoughts and feelings. Esteem building was enhanced through praise, reassurance, normalizing and encouragement. Coping skills were enhanced to build distress tolerance skills and emotional regulation. Allowed patient to freely discuss issues without interruption or judgement with unconditional positive regard, active listening skills, and empathy. Provided a safe, confidential environment to facilitate the development of a positive therapeutic relationship and encourage open, honest communication. Assisted patient in identifying risk factors which would indicate the need for higher level of care including thoughts to harm self or others and/or self-harming behavior and encouraged patient to contact this office, call 911, or present to the nearest emergency room should any of these events occur. Assisted patient in processing session content; acknowledged and normalized patient’s thoughts, feelings, and concerns by utilizing a person-centered approach in efforts to build appropriate rapport and a positive therapeutic relationship with open and honest communication. Patient given education on  medication side effects, diagnosis/illness and relapse symptoms. Plan to continue supportive psychotherapy in next appointment to provide symptom relief.  Diagnoses: as above  Symptoms: as above  Functional status: good  Mental Status Exam: as above    Treatment plan: Medication management and supportive psychotherapy  Prognosis: good  Progress: better, but not at goal  4 wks      6/16: Increase depakote for irritability, anxiety, insomnia, HA's, poor appetite. 17 minutes of supportive psychotherapy with goal to strengthen defenses, promote problems solving, restore adaptive functioning and provide symptom relief. The therapeutic alliance was strengthened to encourage the patient to express their thoughts and feelings. Esteem building was enhanced through praise, reassurance, normalizing and encouragement. Coping skills were enhanced to build distress tolerance skills and emotional regulation. Allowed patient to freely discuss issues without interruption or judgement with unconditional positive regard, active listening skills, and empathy. Provided a safe, confidential environment to facilitate the development of a positive therapeutic relationship and encourage open, honest communication. Assisted patient in identifying risk factors which would indicate the need for higher level of care including thoughts to harm self or others and/or self-harming behavior and encouraged patient to contact this office, call 911, or present to the nearest emergency room should any of these events occur. Assisted patient in processing session content; acknowledged and normalized patient’s thoughts, feelings, and concerns by utilizing a person-centered approach in efforts to build appropriate rapport and a positive therapeutic relationship with open and honest communication. Patient given education on medication side effects, diagnosis/illness and relapse symptoms. Plan to continue supportive psychotherapy in next appointment to  provide symptom relief.  Diagnoses: as above  Symptoms: as above  Functional status: good  Mental Status Exam: as above    Treatment plan: Medication management and supportive psychotherapy  Prognosis: good  Progress: better  2 mos      4/28: Already on propranolol.  Tolerating the Depakote which is helping.  Start Wellbutrin to target depression, anxiety, ADHD.  Patient smokes cannabis so we cannot start a stimulant. 10 minutes of supportive psychotherapy with goal to strengthen defenses, promote problems solving, restore adaptive functioning and provide symptom relief. The therapeutic alliance was strengthened to encourage the patient to express their thoughts and feelings. Esteem building was enhanced through praise, reassurance, normalizing and encouragement. Coping skills were enhanced to build distress tolerance skills and emotional regulation. Patient given education on medication side effects, diagnosis/illness and relapse symptoms. Plan to continue supportive psychotherapy in next appointment to provide symptom relief.  Diagnoses: as above  Symptoms: as above  Functional status: Good  Mental Status Exam: as above    Treatment plan: Medication management and supportive psychotherapy  Prognosis: Good  Progress: Some, significant  7 weeks    3/3: Start therapy.  TBI, ADHD, PTSD, Panic disorder. R/O bipolar. Has never been on a stimulant. Start depakote at night. Consider klonipin, prazosin, stimulant. 15 minutes of supportive psychotherapy with goal to strengthen defenses, promote problems solving, restore adaptive functioning and provide symptom relief. The therapeutic alliance was strengthened to encourage the patient to express their thoughts and feelings. Esteem building was enhanced through praise, reassurance, normalizing and encouragement. Coping skills were enhanced to build distress tolerance skills and emotional regulation. Patient given education on medication side effects, diagnosis/illness and  relapse symptoms. Plan to continue supportive psychotherapy in next appointment to provide symptom relief.  6 wks      PLAN:  49. Safety: No acute safety concerns  50. Therapy: Referral Made  51. Risk Assessment: Risk of self-harm acutely is moderate.  Risk factors include anxiety disorder, mood disorder, PTSD, AODA, access to weapons, and recent psychosocial stressors (pandemic). Protective factors include no family history, no present SI, no history of suicide attempts or self-harm in the past, healthcare seeking, future orientation, willingness to engage in care.  Risk of self-harm chronically is also moderate, but could be further elevated in the event of treatment noncompliance and/or AODA.  52. Meds:  a. AGREE with propranolol.  b. CONTINUE Depakote 500 mg p.o. nightly. Risks, benefits, alternatives discussed with patient including nausea and vomiting, GI upset, sedation, dizziness/falls risk, increased appetite. After discussion of these risks and benefits, the patient voiced understanding and agreed to proceed. Patient also informed of the need to take as prescribed, and for periodic labwork.  c. INCREASE seroquel 50 to 100 mg nightly. Risks, benefits, alternatives discussed with patient including nausea and vomiting, GI upset, sedation, dizziness, falls, akathisia, hypotension, increased appetite, lowering of seizure threshold, theoretical risk of tardive dyskinesia, extrapyramidal symptoms, restless legs syndrome. After discussion of these risks and benefits, the patient voiced understanding and agreed to proceed.  d. STOP Wellbutrin  mg daily. Risks, benefits, alternatives discussed with patient including nausea, GI upset, increased energy, exacerbation of irritability, insomnia, lowering of seizure threshold.  After discussion of these risks and benefits, the patient voiced understanding and agreed to proceed.  53. Labs: Needs a urine drug screen soon  54. Follow up: 6 weeks    Patient screened  positive for depression based on a PHQ-9 score of  on . Follow-up recommendations include: Suicide Risk Assessment performed.           TREATMENT PLAN/GOALS: Continue supportive psychotherapy efforts and medications as indicated. Treatment and medication options discussed during today's visit. Patient acknowledged and verbally consented to continue with current treatment plan and was educated on the importance of compliance with treatment and follow-up appointments.    MEDICATION ISSUES:  MAURA reviewed as expected.  Discussed medication options and treatment plan of prescribed medication as well as the risks, benefits, and side effects including potential falls, possible impaired driving and metabolic adversities among others. Patient is agreeable to call the office with any worsening of symptoms or onset of side effects. Patient is agreeable to call 911 or go to the nearest ER should he/she begin having SI/HI. No medication side effects or related complaints today.     MEDS ORDERED DURING VISIT:  New Medications Ordered This Visit   Medications   • QUEtiapine (SEROquel) 100 MG tablet     Sig: Take 1 tablet by mouth Every Night.     Dispense:  30 tablet     Refill:  2       Return in about 6 weeks (around 9/27/2022).         This document has been electronically signed by Sarita Schultz MD  August 16, 2022 11:00 EDT      Part of this note may be an electronic transcription/translation of spoken language to printed text using the Dragon Dictation System.

## 2022-08-22 ENCOUNTER — TELEPHONE (OUTPATIENT)
Dept: PSYCHIATRY | Facility: CLINIC | Age: 37
End: 2022-08-22

## 2022-08-22 NOTE — TELEPHONE ENCOUNTER
Pt left voicemail requesting letter in regards to her treatment. Will call back to gather more information.

## 2022-08-22 NOTE — TELEPHONE ENCOUNTER
Called pt back to gather more information in regards to what she requires in the letter she is requesting. Pt did not answer, LVMTCB with details in regards to what she needs in the letter and that we would work on it this week.

## 2022-08-22 NOTE — TELEPHONE ENCOUNTER
Pt called and said that she needed the letter to state what her diagnosis and treatment plans were

## 2022-08-23 NOTE — TELEPHONE ENCOUNTER
The urine drug screen has to be obtained randomly, otherwise its results are not significant. We will have to obtain a random screen at a later date.

## 2022-08-23 NOTE — PROGRESS NOTES
"Follow-up (Discuss right breast pain and surgery options)            History of Present Illness  Seda Simmons is a 37 y.o. female who presents to [facility] for ongoing breast pain and surgery options. States discomfort and pulling in right breast and axilla.  Mostly in scar area.  Rates pain at a 3.    Subjective       Bee venom, Latex, Metronidazole, Omeprazole, and Paroxetine  Allergies Reconciled.    Review of Systems    All review of system has been reviewed and it  is negative except the ones note above.     Objective     /82 (BP Location: Left arm, Patient Position: Sitting)   Pulse 109   Temp 98.4 °F (36.9 °C) (Temporal)   Ht 172.7 cm (67.99\")   Wt 54.5 kg (120 lb 3.2 oz)   SpO2 96%   BMI 18.28 kg/m²     Body mass index is 18.28 kg/m².    Physical Exam      Right Breast animation deformity  Reducible umbilical hernia  Hypertrophic scar        Result Review :       Procedures         Assessment and Plan      Diagnoses and all orders for this visit:    1. Breast pain (Primary)    2. Umbilical hernia without obstruction or gangrene    3. Hypertrophic scar        Plan: Right breast reconstruction with implant repositioning from subpec to prepec, mesh and fat graft 1hr    abdominal scar revision with umbilical hernia repair 1hr  2hrs total     I will keep right breast implant the same    I will use mesh to the right breast  Implant:  Sientra [id number] x1    Alloderm contour   X-Large      [id number] x1          I spent 15 minutes caring for Seda on this date of service. This time includes time spent by me in the following activities:performing a medically appropriate examination and/or evaluation  and documenting information in the medical record    Follow Up     No follow-ups on file.    Patient was given instructions and counseling regarding her condition. Please see specific information pulled into the AVS if appropriate.     Arjun " PARRISH Humphrey MD  08/25/2022

## 2022-08-23 NOTE — TELEPHONE ENCOUNTER
Called pt back to inform that her letter is ready to be picked up. Pt understood and confirmed. Pt also asked if provider had a chance to review her UDS results yet to start a new prescription. Pt was informed UDS would be passed to provider for review.     Urine Drug Screen - Urine, Clean Catch (08/16/2022 10:08)

## 2022-08-25 ENCOUNTER — OFFICE VISIT (OUTPATIENT)
Dept: PLASTIC SURGERY | Facility: CLINIC | Age: 37
End: 2022-08-25

## 2022-08-25 VITALS
OXYGEN SATURATION: 96 % | DIASTOLIC BLOOD PRESSURE: 82 MMHG | BODY MASS INDEX: 18.22 KG/M2 | SYSTOLIC BLOOD PRESSURE: 116 MMHG | TEMPERATURE: 98.4 F | HEIGHT: 68 IN | HEART RATE: 109 BPM | WEIGHT: 120.2 LBS

## 2022-08-25 DIAGNOSIS — L91.0 HYPERTROPHIC SCAR: ICD-10-CM

## 2022-08-25 DIAGNOSIS — K42.9 UMBILICAL HERNIA WITHOUT OBSTRUCTION OR GANGRENE: ICD-10-CM

## 2022-08-25 DIAGNOSIS — N64.4 BREAST PAIN: Primary | ICD-10-CM

## 2022-08-25 PROCEDURE — 99212 OFFICE O/P EST SF 10 MIN: CPT | Performed by: SURGERY

## 2022-08-30 ENCOUNTER — OFFICE VISIT (OUTPATIENT)
Dept: FAMILY MEDICINE CLINIC | Facility: CLINIC | Age: 37
End: 2022-08-30

## 2022-08-30 VITALS
SYSTOLIC BLOOD PRESSURE: 114 MMHG | DIASTOLIC BLOOD PRESSURE: 88 MMHG | TEMPERATURE: 98.2 F | OXYGEN SATURATION: 98 % | BODY MASS INDEX: 18.34 KG/M2 | HEIGHT: 68 IN | HEART RATE: 88 BPM | WEIGHT: 121 LBS

## 2022-08-30 DIAGNOSIS — Z01.419 WELL WOMAN EXAM WITH ROUTINE GYNECOLOGICAL EXAM: Primary | ICD-10-CM

## 2022-08-30 DIAGNOSIS — Z23 NEED FOR PNEUMOCOCCAL VACCINATION: ICD-10-CM

## 2022-08-30 DIAGNOSIS — F17.200 NICOTINE DEPENDENCE WITH CURRENT USE: ICD-10-CM

## 2022-08-30 LAB
CANDIDA SPECIES: NEGATIVE
GARDNERELLA VAGINALIS: NEGATIVE
T VAGINALIS DNA VAG QL PROBE+SIG AMP: NEGATIVE

## 2022-08-30 PROCEDURE — G0123 SCREEN CERV/VAG THIN LAYER: HCPCS | Performed by: NURSE PRACTITIONER

## 2022-08-30 PROCEDURE — 87624 HPV HI-RISK TYP POOLED RSLT: CPT | Performed by: NURSE PRACTITIONER

## 2022-08-30 PROCEDURE — 87510 GARDNER VAG DNA DIR PROBE: CPT | Performed by: NURSE PRACTITIONER

## 2022-08-30 PROCEDURE — 90677 PCV20 VACCINE IM: CPT | Performed by: NURSE PRACTITIONER

## 2022-08-30 PROCEDURE — 87480 CANDIDA DNA DIR PROBE: CPT | Performed by: NURSE PRACTITIONER

## 2022-08-30 PROCEDURE — 87660 TRICHOMONAS VAGIN DIR PROBE: CPT | Performed by: NURSE PRACTITIONER

## 2022-08-30 PROCEDURE — 99395 PREV VISIT EST AGE 18-39: CPT | Performed by: NURSE PRACTITIONER

## 2022-08-30 PROCEDURE — 90471 IMMUNIZATION ADMIN: CPT | Performed by: NURSE PRACTITIONER

## 2022-08-30 NOTE — PROGRESS NOTES
"Answers for HPI/ROS submitted by the patient on 8/29/2022  What is the primary reason for your visit?: Physical    Chief Complaint  Gynecologic Exam    Subjective        Seda BALLARD Edvin Brotherslucila presents to [facility]  History of Present Illness  Presents today for well woman's gynecological exam.  Last menstrual period was on 8/16/2022.  Last Pap was 2 to 3 years ago that was normal.  Patient is in a monogamous relationship.  She reports that her menstrual cycles are typically regular.  Denies vaginal discharge or vaginal irritation.  Denies urinary frequency and urgency.    History of breast augmentation.  Recently seen plastic surgeon for evaluation of breast pain.  She has had both breast exams done by plastic surgeon in which she is going to have to have reconstruction on her right breast.    Social History     Socioeconomic History   • Marital status:    Tobacco Use   • Smoking status: Current Every Day Smoker     Packs/day: 1.00     Years: 20.00     Pack years: 20.00     Types: Cigarettes, Cigarettes   • Smokeless tobacco: Never Used   • Tobacco comment: I've temporarily quit several times but always end up going back to smoking   Vaping Use   • Vaping Use: Former   • Substances: Nicotine, CBD, Flavoring   • Devices: Pre-filled pod   Substance and Sexual Activity   • Alcohol use: Yes     Comment: Socially, very occasionally   • Drug use: Never   • Sexual activity: Yes     Partners: Male     Birth control/protection: Rhythm       Objective   Vital Signs:  /88 (BP Location: Left arm, Patient Position: Sitting)   Pulse 88   Temp 98.2 °F (36.8 °C)   Ht 172.7 cm (68\")   Wt 54.9 kg (121 lb)   SpO2 98%   BMI 18.40 kg/m²   Estimated body mass index is 18.4 kg/m² as calculated from the following:    Height as of this encounter: 172.7 cm (68\").    Weight as of this encounter: 54.9 kg (121 lb).          Physical Exam  Vitals reviewed. Exam conducted with a chaperone " present.   Constitutional:       General: She is not in acute distress.     Appearance: Normal appearance. She is well-developed. She is not diaphoretic.   HENT:      Head: Normocephalic and atraumatic. Hair is normal.      Right Ear: Hearing, tympanic membrane, ear canal and external ear normal. No decreased hearing noted. No drainage.      Left Ear: Hearing, tympanic membrane, ear canal and external ear normal. No decreased hearing noted.      Nose: Nose normal. No nasal deformity.      Mouth/Throat:      Mouth: Mucous membranes are moist.   Eyes:      General: Lids are normal.         Right eye: No discharge.         Left eye: No discharge.      Extraocular Movements: Extraocular movements intact.      Conjunctiva/sclera: Conjunctivae normal.      Pupils: Pupils are equal, round, and reactive to light.   Neck:      Thyroid: No thyromegaly.      Vascular: No JVD.   Cardiovascular:      Rate and Rhythm: Normal rate and regular rhythm.      Pulses: Normal pulses.      Heart sounds: Normal heart sounds. No murmur heard.    No friction rub. No gallop.   Pulmonary:      Effort: Pulmonary effort is normal. No respiratory distress.      Breath sounds: Normal breath sounds. No wheezing or rales.   Abdominal:      General: Bowel sounds are normal. There is no distension.      Palpations: Abdomen is soft. There is no mass.      Tenderness: There is no abdominal tenderness. There is no guarding or rebound.      Hernia: No hernia is present.   Genitourinary:     General: Normal vulva.      Pubic Area: No rash.       Labia:         Right: No rash or lesion.         Left: No rash or lesion.       Urethra: No urethral swelling.      Vagina: Normal. No vaginal discharge or lesions.      Cervix: No discharge or cervical bleeding.      Uterus: Normal. Not tender.       Adnexa: Right adnexa normal and left adnexa normal.        Right: No tenderness.          Left: No tenderness.        Rectum: Normal.   Musculoskeletal:          General: No tenderness or deformity. Normal range of motion.      Cervical back: Normal range of motion and neck supple.   Lymphadenopathy:      Cervical: No cervical adenopathy.   Skin:     General: Skin is warm and dry.      Findings: No erythema or rash.   Neurological:      Mental Status: She is alert and oriented to person, place, and time.      Motor: No abnormal muscle tone.      Coordination: Coordination normal.      Deep Tendon Reflexes: Reflexes are normal and symmetric. Reflexes normal.   Psychiatric:         Mood and Affect: Mood normal.         Behavior: Behavior normal.         Thought Content: Thought content normal.         Judgment: Judgment normal.        Result Review :                Assessment and Plan   Diagnoses and all orders for this visit:    1. Well woman exam with routine gynecological exam (Primary)  -     IgP, Aptima HPV  -     Gardnerella vaginalis, Trichomonas vaginalis, Candida albicans, DNA - Swab, Vagina    2. Need for pneumococcal vaccination  -     Pneumococcal Conjugate Vaccine 20-Valent (PCV20)    3. Nicotine dependence with current use    Patient will continue follow-ups with plastic surgeon for breast pain and reconstruction of breast augmentation.  She also has an umbilical hernia which she has been scheduled for surgery.  Discussed nicotine cessation.  We will give Prevnar 20 for pneumococcal vaccination.    Seda BALLARD Edvin Simmons  reports that she has been smoking cigarettes and cigarettes. She has a 20.00 pack-year smoking history. She has never used smokeless tobacco.. I have educated her on the risk of diseases from using tobacco products such as cancer, COPD and heart disease.     I advised her to quit and she is not willing to quit.    I spent 3  minutes counseling the patient.                Follow Up   Return if symptoms worsen or fail to improve.  Patient was given instructions and counseling regarding her condition or for health maintenance advice. Please see  specific information pulled into the AVS if appropriate.

## 2022-09-07 LAB
CYTOLOGIST CVX/VAG CYTO: NORMAL
CYTOLOGY CVX/VAG DOC CYTO: NORMAL
CYTOLOGY CVX/VAG DOC THIN PREP: NORMAL
DX ICD CODE: NORMAL
HIV 1 & 2 AB SER-IMP: NORMAL
HPV I/H RISK 4 DNA CVX QL PROBE+SIG AMP: NEGATIVE
OTHER STN SPEC: NORMAL
STAT OF ADQ CVX/VAG CYTO-IMP: NORMAL

## 2022-09-27 ENCOUNTER — OFFICE VISIT (OUTPATIENT)
Dept: PSYCHIATRY | Facility: CLINIC | Age: 37
End: 2022-09-27

## 2022-09-27 VITALS
SYSTOLIC BLOOD PRESSURE: 128 MMHG | HEIGHT: 68 IN | BODY MASS INDEX: 19.4 KG/M2 | WEIGHT: 128 LBS | DIASTOLIC BLOOD PRESSURE: 73 MMHG

## 2022-09-27 DIAGNOSIS — F41.0 PANIC ATTACKS: ICD-10-CM

## 2022-09-27 DIAGNOSIS — F33.1 MAJOR DEPRESSIVE DISORDER, RECURRENT EPISODE, MODERATE: ICD-10-CM

## 2022-09-27 DIAGNOSIS — S06.9X9D TRAUMATIC BRAIN INJURY WITH LOSS OF CONSCIOUSNESS, SUBSEQUENT ENCOUNTER: Primary | ICD-10-CM

## 2022-09-27 DIAGNOSIS — F51.05 INSOMNIA DUE TO MENTAL CONDITION: ICD-10-CM

## 2022-09-27 DIAGNOSIS — F90.0 ADHD (ATTENTION DEFICIT HYPERACTIVITY DISORDER), INATTENTIVE TYPE: ICD-10-CM

## 2022-09-27 DIAGNOSIS — F43.10 POST TRAUMATIC STRESS DISORDER (PTSD): ICD-10-CM

## 2022-09-27 DIAGNOSIS — F41.1 GENERALIZED ANXIETY DISORDER: ICD-10-CM

## 2022-09-27 PROCEDURE — 90833 PSYTX W PT W E/M 30 MIN: CPT | Performed by: STUDENT IN AN ORGANIZED HEALTH CARE EDUCATION/TRAINING PROGRAM

## 2022-09-27 PROCEDURE — 99214 OFFICE O/P EST MOD 30 MIN: CPT | Performed by: STUDENT IN AN ORGANIZED HEALTH CARE EDUCATION/TRAINING PROGRAM

## 2022-09-27 RX ORDER — DIVALPROEX SODIUM 250 MG/1
250 TABLET, EXTENDED RELEASE ORAL DAILY
COMMUNITY
End: 2023-01-10 | Stop reason: SDUPTHER

## 2022-09-27 RX ORDER — VILOXAZINE HYDROCHLORIDE 200 MG/1
200 CAPSULE, EXTENDED RELEASE ORAL DAILY
Qty: 28 CAPSULE | Refills: 0 | COMMUNITY
Start: 2022-09-27 | End: 2022-11-08 | Stop reason: SDUPTHER

## 2022-09-27 NOTE — PATIENT INSTRUCTIONS
1.  Please return to clinic at your next scheduled visit.  Contact the clinic ([phone]) at least 24 hours prior in the event you need to cancel.  2.  Do no harm to yourself or others.    3.  Avoid alcohol and drugs.    4.  Take all medications as prescribed.  Please contact the clinic with any concerns. If you are in need of medication refills, please call the clinic at [phone].    5. Should you want to get in touch with your provider, Dr. Sarita Schultz, please utilize [facility] or contact the office ([phone]), and staff will be able to page Dr. Schultz directly.  6.  In the event you have personal crisis, contact the following crisis numbers: Suicide Prevention Hotline 1-710.374.7628; [facility] Helpline 6-791-520-AGMB; [facility] Emergency Room [phone]; text HELLO to 523758; or 971.     SPECIFIC RECOMMENDATIONS:     1.      Medications discussed at this encounter:                   - no changes     2.      Psychotherapy recommendations:      3.     Return to clinic: 6 weeks

## 2022-09-27 NOTE — PROGRESS NOTES
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   • T4, Total 04/05/2022 6.00  4.50 - 11.70 mcg/dL Final    T4 results may be falsely increased if patient taking Biotin.   • Phosphorus 04/05/2022 3.8  2.5 - 4.5 mg/dL Final   • WBC 04/05/2022 4.53  3.40 - 10.80 10*3/mm3 Final   • RBC 04/05/2022 4.57  3.77 - 5.28 10*6/mm3 Final   • Hemoglobin 04/05/2022 13.7  12.0 - 15.9 g/dL Final   • Hematocrit 04/05/2022 42.1  34.0 - 46.6 % Final   • MCV 04/05/2022 92.1  79.0 - 97.0 fL Final   • MCH 04/05/2022 30.0  26.6 - 33.0 pg Final   • MCHC 04/05/2022 32.5  31.5 - 35.7 g/dL Final   • RDW 04/05/2022 13.3  12.3 - 15.4 % Final   • RDW-SD 04/05/2022 45.4  37.0 - 54.0 fl Final   • MPV 04/05/2022 9.6  6.0 - 12.0 fL Final   • Platelets 04/05/2022 440  140 - 450 10*3/mm3 Final   • Neutrophil % 04/05/2022 29.2 (A) 42.7 - 76.0 % Final   • Lymphocyte % 04/05/2022 47.9 (A) 19.6 - 45.3 % Final   • Monocyte % 04/05/2022 17.7 (A) 5.0 - 12.0 % Final   • Eosinophil % 04/05/2022 5.2  0.3 - 6.2 % Final   • Neutrophils Absolute 04/05/2022 1.32 (A) 1.70 - 7.00 10*3/mm3 Final   • Lymphocytes Absolute 04/05/2022 2.17  0.70 - 3.10 10*3/mm3 Final   • Monocytes Absolute 04/05/2022 0.80  0.10 - 0.90 10*3/mm3 Final   • Eosinophils Absolute 04/05/2022 0.24  0.00 - 0.40 10*3/mm3 Final   • [place] Cells 04/05/2022 Mod/2+  None Seen Final   • Poikilocytes 04/05/2022 Mod/2+   None Seen Final   • WBC Morphology 04/05/2022 Normal  Normal Final   • Platelet Morphology 04/05/2022 Normal  Normal Final   Hospital Outpatient Visit on 03/31/2022   Component Date Value Ref Range Status   • Target HR (85%) 03/31/2022 156  bpm Final   • Max. Pred. HR (100%) 03/31/2022 183  bpm Final       EKG Results:  No orders to display       Imaging Results:  US Thyroid    Result Date: 1/28/2022   Stable thyroid nodules     LAURIE TROTTER MD       Electronically Signed and Approved By: LAURIE TROTTER MD on 1/28/2022 at 8:56                 Assessment & Plan   Diagnoses and all orders for this visit:    1. Traumatic brain injury with loss of consciousness, subsequent encounter (Primary)    2. Major depressive disorder, recurrent episode, moderate (HCC)    3. Generalized anxiety disorder    4. ADHD (attention deficit hyperactivity disorder), inattentive type  -     Viloxazine HCl ER (Qelbree) 200 MG capsule sustained-release 24 hr; Take 200 mg by mouth Daily.  Dispense: 28 capsule; Refill: 0    5. Post traumatic stress disorder (PTSD)    6. Insomnia due to mental condition    7. Panic attacks        Visit Diagnoses:    ICD-10-CM ICD-9-CM   1. Traumatic brain injury with loss of consciousness, subsequent encounter  S06.9X9D V58.89     854.06   2. Major depressive disorder, recurrent episode, moderate (HCC)  F33.1 296.32   3. Generalized anxiety disorder  F41.1 300.02   4. ADHD (attention deficit hyperactivity disorder), inattentive type  F90.0 314.00   5. Post traumatic stress disorder (PTSD)  F43.10 309.81   6. Insomnia due to mental condition  F51.05 300.9     327.02   7. Panic attacks  F41.0 300.01     9/27: Start qelbree for ADHD. Residual anxiety. 18 minutes of supportive psychotherapy with goal to strengthen defenses, promote problems solving, restore adaptive functioning and provide symptom relief. The therapeutic alliance was strengthened to encourage the patient to express their thoughts and feelings. Esteem  building was enhanced through praise, reassurance, normalizing and encouragement. Coping skills were enhanced to build distress tolerance skills and emotional regulation. Allowed patient to freely discuss issues without interruption or judgement with unconditional positive regard, active listening skills, and empathy. Provided a safe, confidential environment to facilitate the development of a positive therapeutic relationship and encourage open, honest communication. Assisted patient in identifying risk factors which would indicate the need for higher level of care including thoughts to harm self or others and/or self-harming behavior and encouraged patient to contact this office, call 911, or present to the nearest emergency room should any of these events occur. Assisted patient in processing session content; acknowledged and normalized patient’s thoughts, feelings, and concerns by utilizing a person-centered approach in efforts to build appropriate rapport and a positive therapeutic relationship with open and honest communication. Patient given education on medication side effects, diagnosis/illness and relapse symptoms. Plan to continue supportive psychotherapy in next appointment to provide symptom relief.  Diagnoses: as above  Symptoms: as above  Functional status: good  Mental Status Exam: as above    Treatment plan: Medication management and supportive psychotherapy  Prognosis: good  Progress: stable, not at goal re: anxiety  6 wks      8/16: Improving. Increase seroquel. Await UDS. Low threshold to start adderall for ADHD (if negative then start and have her sign CSA in 6 wks). 17 minutes of supportive psychotherapy  Treatment plan: Medication management and supportive psychotherapy  Prognosis: good  Progress: improving  6 wks      7/15: Encouraged continued cessation of cannabis. Start seroquel. Suspicion of bipolar. Hyperverbal, not sleeping well. 17 minutes of supportive psychotherapy Treatment plan:  Medication management and supportive psychotherapy  Prognosis: good  Progress: better, but not at goal  4 wks      6/16: Increase depakote for irritability, anxiety, insomnia, HA's, poor appetite. 17 minutes of supportive psychotherapyTreatment plan: Medication management and supportive psychotherapy  Prognosis: good  Progress: better  2 mos      4/28: Already on propranolol.  Tolerating the Depakote which is helping.  Start Wellbutrin to target depression, anxiety, ADHD.  Patient smokes cannabis so we cannot start a stimulant. 10 minutes of supportive psychotherapy   Treatment plan: Medication management and supportive psychotherapy  Prognosis: Good  Progress: Some, significant  7 weeks    3/3: Start therapy.  TBI, ADHD, PTSD, Panic disorder. R/O bipolar. Has never been on a stimulant. Start depakote at night. Consider klonipin, prazosin, stimulant. 15 minutes of supportive psychotherapy  6 wks      PLAN:  62. Safety: No acute safety concerns  63. Therapy: Referral Made  64. Risk Assessment: Risk of self-harm acutely is moderate.  Risk factors include anxiety disorder, mood disorder, PTSD, AODA, access to weapons, and recent psychosocial stressors (pandemic). Protective factors include no family history, no present SI, no history of suicide attempts or self-harm in the past, healthcare seeking, future orientation, willingness to engage in care.  Risk of self-harm chronically is also moderate, but could be further elevated in the event of treatment noncompliance and/or AODA.  65. Meds:  a. AGREE with propranolol.  b. START qelbree 200 mg daily. Risks, benefits, side effects discussed with patient including elevated heart rate, elevated blood pressure, irritability, insomnia, sexual dysfunction, appetite suppressing properties, psychosis.  After discussion of these risks and benefits, the patient voiced understanding and agreed to proceed.  c. SHE REDUCED Depakote 500 to 250 mg p.o. nightly. Risks, benefits,  alternatives discussed with patient including nausea and vomiting, GI upset, sedation, dizziness/falls risk, increased appetite. After discussion of these risks and benefits, the patient voiced understanding and agreed to proceed. Patient also informed of the need to take as prescribed, and for periodic labwork.  d. STOPPED seroquel 100 mg nightly. Crazy dreams.  e. STOP Wellbutrin  mg daily. Risks, benefits, alternatives discussed with patient including nausea, GI upset, increased energy, exacerbation of irritability, insomnia, lowering of seizure threshold.  After discussion of these risks and benefits, the patient voiced understanding and agreed to proceed.  66. Labs: Needs a urine drug screen soon  67. Follow up: 6 weeks    Patient screened positive for depression based on a PHQ-9 score of  on . Follow-up recommendations include: Suicide Risk Assessment performed.           TREATMENT PLAN/GOALS: Continue supportive psychotherapy efforts and medications as indicated. Treatment and medication options discussed during today's visit. Patient acknowledged and verbally consented to continue with current treatment plan and was educated on the importance of compliance with treatment and follow-up appointments.    MEDICATION ISSUES:  MAURA reviewed as expected.  Discussed medication options and treatment plan of prescribed medication as well as the risks, benefits, and side effects including potential falls, possible impaired driving and metabolic adversities among others. Patient is agreeable to call the office with any worsening of symptoms or onset of side effects. Patient is agreeable to call 911 or go to the nearest ER should he/she begin having SI/HI. No medication side effects or related complaints today.     MEDS ORDERED DURING VISIT:  New Medications Ordered This Visit   Medications   • Viloxazine HCl ER (Qelbree) 200 MG capsule sustained-release 24 hr     Sig: Take 200 mg by mouth Daily.     Dispense:   28 capsule     Refill:  0       Return in about 6 weeks (around 11/8/2022).         This document has been electronically signed by Sarita Schultz MD  September 27, 2022 11:56 EDT      Part of this note may be an electronic transcription/translation of spoken language to printed text using the Dragon Dictation System.

## 2022-09-28 DIAGNOSIS — E55.9 VITAMIN D DEFICIENCY: ICD-10-CM

## 2022-09-28 RX ORDER — ERGOCALCIFEROL 1.25 MG/1
CAPSULE ORAL
Qty: 13 CAPSULE | Refills: 1 | Status: SHIPPED | OUTPATIENT
Start: 2022-09-28

## 2022-09-30 ENCOUNTER — OFFICE VISIT (OUTPATIENT)
Dept: FAMILY MEDICINE CLINIC | Facility: CLINIC | Age: 37
End: 2022-09-30

## 2022-09-30 VITALS
OXYGEN SATURATION: 98 % | SYSTOLIC BLOOD PRESSURE: 114 MMHG | HEART RATE: 85 BPM | HEIGHT: 68 IN | WEIGHT: 128.4 LBS | TEMPERATURE: 98.3 F | BODY MASS INDEX: 19.46 KG/M2 | DIASTOLIC BLOOD PRESSURE: 66 MMHG

## 2022-09-30 DIAGNOSIS — J01.90 ACUTE NON-RECURRENT SINUSITIS, UNSPECIFIED LOCATION: ICD-10-CM

## 2022-09-30 DIAGNOSIS — M25.552 LEFT HIP PAIN: ICD-10-CM

## 2022-09-30 DIAGNOSIS — Z23 NEED FOR INFLUENZA VACCINATION: Primary | ICD-10-CM

## 2022-09-30 DIAGNOSIS — G89.29 CHRONIC BILATERAL LOW BACK PAIN WITH LEFT-SIDED SCIATICA: ICD-10-CM

## 2022-09-30 DIAGNOSIS — M54.42 CHRONIC BILATERAL LOW BACK PAIN WITH LEFT-SIDED SCIATICA: ICD-10-CM

## 2022-09-30 PROCEDURE — 90471 IMMUNIZATION ADMIN: CPT | Performed by: NURSE PRACTITIONER

## 2022-09-30 PROCEDURE — 99214 OFFICE O/P EST MOD 30 MIN: CPT | Performed by: NURSE PRACTITIONER

## 2022-09-30 PROCEDURE — 90686 IIV4 VACC NO PRSV 0.5 ML IM: CPT | Performed by: NURSE PRACTITIONER

## 2022-09-30 PROCEDURE — 96372 THER/PROPH/DIAG INJ SC/IM: CPT | Performed by: NURSE PRACTITIONER

## 2022-09-30 RX ORDER — LIDOCAINE 50 MG/G
1 PATCH TOPICAL EVERY 24 HOURS
Qty: 30 EACH | Refills: 3 | Status: SHIPPED | OUTPATIENT
Start: 2022-09-30 | End: 2023-01-19

## 2022-09-30 RX ORDER — AMOXICILLIN AND CLAVULANATE POTASSIUM 875; 125 MG/1; MG/1
1 TABLET, FILM COATED ORAL 2 TIMES DAILY
Qty: 14 TABLET | Refills: 0 | Status: SHIPPED | OUTPATIENT
Start: 2022-09-30 | End: 2022-11-08

## 2022-09-30 RX ORDER — FLUTICASONE PROPIONATE 50 MCG
2 SPRAY, SUSPENSION (ML) NASAL DAILY
Qty: 16 G | Refills: 1 | Status: SHIPPED | OUTPATIENT
Start: 2022-09-30

## 2022-09-30 RX ORDER — KETOROLAC TROMETHAMINE 30 MG/ML
60 INJECTION, SOLUTION INTRAMUSCULAR; INTRAVENOUS ONCE
Status: COMPLETED | OUTPATIENT
Start: 2022-09-30 | End: 2022-09-30

## 2022-09-30 RX ADMIN — KETOROLAC TROMETHAMINE 60 MG: 30 INJECTION, SOLUTION INTRAMUSCULAR; INTRAVENOUS at 11:43

## 2022-09-30 NOTE — PROGRESS NOTES
"Chief Complaint  Leg Pain, Back Pain, Shoulder Pain, and Sinus Problem    Maryjo Stack ELIO Simmons presents to [facility]   Presents today for follow-up on back and hip pain and for acute sinusitis.  Back Pain  This is a chronic problem. The current episode started more than 1 year ago. The problem occurs constantly. The problem has been waxing and waning since onset. The pain is present in the gluteal, lumbar spine, sacro-iliac and thoracic spine. The quality of the pain is described as aching, burning, shooting and stabbing. The pain radiates to the left foot, left knee, left thigh and right thigh. The pain is at a severity of 7/10. The pain is the same all the time. The symptoms are aggravated by position, lying down, sitting and standing. Stiffness is present all day. Associated symptoms include chest pain, paresthesias, pelvic pain and weakness. Pertinent negatives include no abdominal pain, bladder incontinence, bowel incontinence, dysuria, fever, paresis, perianal numbness or weight loss. She has tried bed rest, NSAIDs, ice, muscle relaxant and home exercises for the symptoms. The treatment provided mild relief.     She has been having nasal congestion nasal pressure over the past week.  She has mild sore throat.  Denies fever and chills.  She has been taking Benadryl as needed.    Objective   Vital Signs:  /66 (BP Location: Left arm, Patient Position: Sitting)   Pulse 85   Temp 98.3 °F (36.8 °C)   Ht 172.7 cm (68\")   Wt 58.2 kg (128 lb 6.4 oz)   SpO2 98%   BMI 19.52 kg/m²   Estimated body mass index is 19.52 kg/m² as calculated from the following:    Height as of this encounter: 172.7 cm (68\").    Weight as of this encounter: 58.2 kg (128 lb 6.4 oz).    BMI is within normal parameters. No other follow-up for BMI required.      Physical Exam  Vitals reviewed.   Constitutional:       Appearance: Normal appearance. She is well-developed.   HENT:    "   Head: Normocephalic and atraumatic.      Right Ear: Tympanic membrane and external ear normal. No tenderness. No middle ear effusion. There is no impacted cerumen. Tympanic membrane is not injected.      Left Ear: Tympanic membrane and external ear normal. No tenderness.  No middle ear effusion. There is no impacted cerumen. Tympanic membrane is not injected.      Nose: Congestion present.      Right Sinus: Maxillary sinus tenderness and frontal sinus tenderness present.      Left Sinus: Maxillary sinus tenderness and frontal sinus tenderness present.      Mouth/Throat:      Lips: Pink.      Mouth: Mucous membranes are moist.      Pharynx: Posterior oropharyngeal erythema present. No oropharyngeal exudate.      Tonsils: No tonsillar exudate.   Eyes:      Conjunctiva/sclera: Conjunctivae normal.      Pupils: Pupils are equal, round, and reactive to light.   Cardiovascular:      Rate and Rhythm: Normal rate and regular rhythm.      Heart sounds: No murmur heard.    No friction rub. No gallop.   Pulmonary:      Effort: Pulmonary effort is normal.      Breath sounds: Normal breath sounds. No wheezing or rhonchi.   Abdominal:      General: Bowel sounds are normal. There is no distension.      Palpations: Abdomen is soft.      Tenderness: There is no abdominal tenderness.   Musculoskeletal:      Cervical back: Normal range of motion and neck supple.      Lumbar back: Spasms and tenderness present.      Left hip: Tenderness present.   Lymphadenopathy:      Cervical: No cervical adenopathy.   Skin:     General: Skin is warm and dry.   Neurological:      Mental Status: She is alert and oriented to person, place, and time.   Psychiatric:         Mood and Affect: Mood and affect normal.         Behavior: Behavior normal.         Thought Content: Thought content normal.         Judgment: Judgment normal.        Result Review :                Assessment and Plan   Diagnoses and all orders for this visit:    1. Need for  influenza vaccination (Primary)  -     FluLaval/Fluzone >6 mos ([id number])    2. Left hip pain  -     XR Hip With or Without Pelvis 2 - 3 View Left; Future  -     ketorolac (TORADOL) injection 60 mg    3. Chronic bilateral low back pain with left-sided sciatica  -     XR Spine Lumbar Complete 4+VW; Future  -     ketorolac (TORADOL) injection 60 mg    4. Acute non-recurrent sinusitis, unspecified location  -     amoxicillin-clavulanate (Augmentin) 875-125 MG per tablet; Take 1 tablet by mouth 2 (Two) Times a Day.  Dispense: 14 tablet; Refill: 0    Other orders  -     lidocaine (LIDODERM) 5 %; Place 1 patch on the skin as directed by provider Daily. Remove & Discard patch within 12 hours or as directed by MD  Dispense: 30 each; Refill: 3  -     fluticasone (Flonase) 50 MCG/ACT nasal spray; 2 sprays into the nostril(s) as directed by provider Daily.  Dispense: 16 g; Refill: 1    Order x-rays of her hip and low back.  Also order lidocaine patches.  Continue stretching and exercising at home.  Given Toradol 60 mg IM today.  Prescribed Augmentin for acute sinusitis.  Instructed to also take a antihistamine with a Sudafed.  Also discussed symptomatic treatment including throat lozenges and warm fluids.         Follow Up   Return in about 3 months (around 12/30/2022), or if symptoms worsen or fail to improve, for Next scheduled follow up.  Patient was given instructions and counseling regarding her condition or for health maintenance advice. Please see specific information pulled into the AVS if appropriate.

## 2022-11-08 ENCOUNTER — OFFICE VISIT (OUTPATIENT)
Dept: PSYCHIATRY | Facility: CLINIC | Age: 37
End: 2022-11-08

## 2022-11-08 ENCOUNTER — PRIOR AUTHORIZATION (OUTPATIENT)
Dept: PSYCHIATRY | Facility: CLINIC | Age: 37
End: 2022-11-08

## 2022-11-08 VITALS
WEIGHT: 134.8 LBS | DIASTOLIC BLOOD PRESSURE: 76 MMHG | BODY MASS INDEX: 20.43 KG/M2 | HEART RATE: 91 BPM | HEIGHT: 68 IN | SYSTOLIC BLOOD PRESSURE: 120 MMHG

## 2022-11-08 DIAGNOSIS — F90.0 ADHD (ATTENTION DEFICIT HYPERACTIVITY DISORDER), INATTENTIVE TYPE: ICD-10-CM

## 2022-11-08 DIAGNOSIS — F51.05 INSOMNIA DUE TO MENTAL CONDITION: ICD-10-CM

## 2022-11-08 DIAGNOSIS — F41.1 GENERALIZED ANXIETY DISORDER: ICD-10-CM

## 2022-11-08 DIAGNOSIS — F33.1 MAJOR DEPRESSIVE DISORDER, RECURRENT EPISODE, MODERATE: Primary | ICD-10-CM

## 2022-11-08 DIAGNOSIS — F43.10 POST TRAUMATIC STRESS DISORDER (PTSD): ICD-10-CM

## 2022-11-08 DIAGNOSIS — S06.9X9D TRAUMATIC BRAIN INJURY WITH LOSS OF CONSCIOUSNESS, SUBSEQUENT ENCOUNTER: ICD-10-CM

## 2022-11-08 DIAGNOSIS — F41.0 PANIC ATTACKS: ICD-10-CM

## 2022-11-08 PROCEDURE — 90833 PSYTX W PT W E/M 30 MIN: CPT | Performed by: STUDENT IN AN ORGANIZED HEALTH CARE EDUCATION/TRAINING PROGRAM

## 2022-11-08 PROCEDURE — 99214 OFFICE O/P EST MOD 30 MIN: CPT | Performed by: STUDENT IN AN ORGANIZED HEALTH CARE EDUCATION/TRAINING PROGRAM

## 2022-11-08 RX ORDER — VILOXAZINE HYDROCHLORIDE 200 MG/1
400 CAPSULE, EXTENDED RELEASE ORAL DAILY
Qty: 60 CAPSULE | Refills: 2 | Status: SHIPPED | OUTPATIENT
Start: 2022-11-08 | End: 2022-11-10 | Stop reason: SDUPTHER

## 2022-11-08 NOTE — PATIENT INSTRUCTIONS
1.  Please return to clinic at your next scheduled visit.  Contact the clinic ([phone]) at least 24 hours prior in the event you need to cancel.  2.  Do no harm to yourself or others.    3.  Avoid alcohol and drugs.    4.  Take all medications as prescribed.  Please contact the clinic with any concerns. If you are in need of medication refills, please call the clinic at [phone].    5. Should you want to get in touch with your provider, Dr. Sarita Schultz, please utilize [employer] or contact the office ([phone]), and staff will be able to page Dr. Schultz directly.  6.  In the event you have personal crisis, contact the following crisis numbers: Suicide Prevention Hotline 1-737.564.7932; [facility] Helpline 0-535-421-KSCA; [facility] Emergency Room [phone]; text HELLO to [phone]; or 572.     SPECIFIC RECOMMENDATIONS:     1.      Medications discussed at this encounter:                   - increase qelbree     2.      Psychotherapy recommendations:      3.     Return to clinic: 6 weeks

## 2022-11-08 NOTE — PROGRESS NOTES
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 therapeutic alliance was strengthened to encourage the patient to express their thoughts and feelings. Esteem building was enhanced through praise, reassurance, normalizing and encouragement. Coping skills were enhanced to build distress tolerance skills and emotional regulation. Allowed patient to freely discuss issues without interruption or judgement with unconditional positive regard, active listening skills, and empathy. Provided a safe, confidential environment to facilitate the development of a positive therapeutic relationship and encourage open, honest communication. Assisted patient in identifying risk factors which would indicate the need for higher level of care including thoughts to harm self or others and/or self-harming behavior and encouraged patient to contact this office, call 911, or present to the nearest emergency room should any of these events occur. Assisted patient in processing session content; acknowledged and normalized patient’s thoughts, feelings, and concerns by utilizing a person-centered approach in efforts to build appropriate rapport and a positive therapeutic relationship with open and honest communication. Patient given education on medication side effects, diagnosis/illness and relapse symptoms. Plan to continue supportive psychotherapy in next  appointment to provide symptom relief.  Diagnoses: as above  Symptoms: as above  Functional status: good  Mental Status Exam: as above    Treatment plan: Medication management and supportive psychotherapy  Prognosis: good  Progress: residual adhd, dep, anx  6 wks      9/27: Start qelbree for ADHD. Residual anxiety. 18 minutes of supportive psychotherapy   Treatment plan: Medication management and supportive psychotherapy  Prognosis: good  Progress: stable, not at goal re: anxiety  6 wks      8/16: Improving. Increase seroquel. Await UDS. Low threshold to start adderall for ADHD (if negative then start and have her sign CSA in 6 wks). 17 minutes of supportive psychotherapy  Treatment plan: Medication management and supportive psychotherapy  Prognosis: good  Progress: improving  6 wks      7/15: Encouraged continued cessation of cannabis. Start seroquel. Suspicion of bipolar. Hyperverbal, not sleeping well. 17 minutes of supportive psychotherapy Treatment plan: Medication management and supportive psychotherapy  Prognosis: good  Progress: better, but not at goal  4 wks      6/16: Increase depakote for irritability, anxiety, insomnia, HA's, poor appetite. 17 minutes of supportive psychotherapyTreatment plan: Medication management and supportive psychotherapy  Prognosis: good  Progress: better  2 mos      4/28: Already on propranolol.  Tolerating the Depakote which is helping.  Start Wellbutrin to target depression, anxiety, ADHD.  Patient smokes cannabis so we cannot start a stimulant. 10 minutes of supportive psychotherapy   Treatment plan: Medication management and supportive psychotherapy  Prognosis: Good  Progress: Some, significant  7 weeks    3/3: Start therapy.  TBI, ADHD, PTSD, Panic disorder. R/O bipolar. Has never been on a stimulant. Start depakote at night. Consider klonipin, prazosin, stimulant. 15 minutes of supportive psychotherapy  6 wks      PLAN:  78. Safety: No acute safety concerns  79. Therapy:  Referral Made  80. Risk Assessment: Risk of self-harm acutely is moderate.  Risk factors include anxiety disorder, mood disorder, PTSD, AODA, access to weapons, and recent psychosocial stressors (pandemic). Protective factors include no family history, no present SI, no history of suicide attempts or self-harm in the past, healthcare seeking, future orientation, willingness to engage in care.  Risk of self-harm chronically is also moderate, but could be further elevated in the event of treatment noncompliance and/or AODA.  81. Meds:  a. AGREE with propranolol 20 tid.  b. INCREASE qelbree 200 to 400 mg daily. Risks, benefits, side effects discussed with patient including elevated heart rate, elevated blood pressure, irritability, insomnia, sexual dysfunction, appetite suppressing properties, psychosis.  After discussion of these risks and benefits, the patient voiced understanding and agreed to proceed.  c. SHE REDUCED Depakote 500 to 250 mg p.o. nightly. Risks, benefits, alternatives discussed with patient including nausea and vomiting, GI upset, sedation, dizziness/falls risk, increased appetite. After discussion of these risks and benefits, the patient voiced understanding and agreed to proceed. Patient also informed of the need to take as prescribed, and for periodic labwork.  d. STOPPED seroquel 100 mg nightly. Crazy dreams.  e. STOP Wellbutrin  mg daily. Risks, benefits, alternatives discussed with patient including nausea, GI upset, increased energy, exacerbation of irritability, insomnia, lowering of seizure threshold.  After discussion of these risks and benefits, the patient voiced understanding and agreed to proceed.  82. Labs: Needs a urine drug screen soon  83. Follow up: 6 weeks    Patient screened positive for depression based on a PHQ-9 score of  on . Follow-up recommendations include: Suicide Risk Assessment performed.           TREATMENT PLAN/GOALS: Continue supportive psychotherapy efforts  and medications as indicated. Treatment and medication options discussed during today's visit. Patient acknowledged and verbally consented to continue with current treatment plan and was educated on the importance of compliance with treatment and follow-up appointments.    MEDICATION ISSUES:  MAURA reviewed as expected.  Discussed medication options and treatment plan of prescribed medication as well as the risks, benefits, and side effects including potential falls, possible impaired driving and metabolic adversities among others. Patient is agreeable to call the office with any worsening of symptoms or onset of side effects. Patient is agreeable to call 911 or go to the nearest ER should he/she begin having SI/HI. No medication side effects or related complaints today.     MEDS ORDERED DURING VISIT:  New Medications Ordered This Visit   Medications   • Viloxazine HCl ER (Qelbree) 200 MG capsule sustained-release 24 hr     Sig: Take 400 mg by mouth Daily.     Dispense:  60 capsule     Refill:  2     Order Specific Question:   Lot Number?     Answer:   [id number]     Order Specific Question:   Expiration Date?     Answer:   7/31/2024     Order Specific Question:   Quantity     Answer:   28       Return in about 6 weeks (around 12/20/2022) for urgent.         This document has been electronically signed by Sarita Schultz MD  November 8, 2022 11:04 EST      Part of this note may be an electronic transcription/translation of spoken language to printed text using the Dragon Dictation System.

## 2022-11-10 RX ORDER — VILOXAZINE HYDROCHLORIDE 200 MG/1
400 CAPSULE, EXTENDED RELEASE ORAL DAILY
Qty: 56 CAPSULE | Refills: 0 | COMMUNITY
Start: 2022-11-10 | End: 2023-01-19

## 2022-11-10 NOTE — TELEPHONE ENCOUNTER
PRIOR AUTHORIZATION FOR QELBREE 200MG DENIED. PLEASE REVIEW.     BEHAVIORAL HEALTH - SCAN - CAROLINE TURNER, 11/08/2022 (11/08/2022)

## 2022-11-10 NOTE — TELEPHONE ENCOUNTER
CALLED PT TO OFFER SAMPLES FOR GINETTE. PT DID NOT ANSWER, LVM TO NOTIFY SAMPLES ARE AVAILABLE TO PICKUP IF SHE'S INTERESTED AND TO CALL BACK WITH QUESTIONS OR TO CONFIRM SAMPLES.

## 2022-11-17 ENCOUNTER — HOSPITAL ENCOUNTER (EMERGENCY)
Facility: HOSPITAL | Age: 37
Discharge: LEFT BEFORE DISCHARGE | End: 2022-11-17

## 2022-11-17 VITALS
HEIGHT: 68 IN | HEART RATE: 106 BPM | SYSTOLIC BLOOD PRESSURE: 112 MMHG | RESPIRATION RATE: 18 BRPM | WEIGHT: 121.25 LBS | TEMPERATURE: 98.1 F | BODY MASS INDEX: 18.38 KG/M2 | OXYGEN SATURATION: 100 % | DIASTOLIC BLOOD PRESSURE: 79 MMHG

## 2022-11-17 LAB
HOLD SPECIMEN: NORMAL
HOLD SPECIMEN: NORMAL
WHOLE BLOOD HOLD COAG: NORMAL
WHOLE BLOOD HOLD SPECIMEN: NORMAL

## 2022-11-17 PROCEDURE — 36415 COLL VENOUS BLD VENIPUNCTURE: CPT

## 2022-11-17 PROCEDURE — 99211 OFF/OP EST MAY X REQ PHY/QHP: CPT

## 2022-11-17 RX ORDER — SODIUM CHLORIDE 0.9 % (FLUSH) 0.9 %
10 SYRINGE (ML) INJECTION AS NEEDED
Status: DISCONTINUED | OUTPATIENT
Start: 2022-11-17 | End: 2022-11-17 | Stop reason: HOSPADM

## 2022-11-18 ENCOUNTER — HOSPITAL ENCOUNTER (EMERGENCY)
Facility: HOSPITAL | Age: 37
Discharge: HOME | End: 2022-11-18
Attending: EMERGENCY MEDICINE | Admitting: EMERGENCY MEDICINE

## 2022-11-18 VITALS
SYSTOLIC BLOOD PRESSURE: 113 MMHG | HEIGHT: 68 IN | TEMPERATURE: 97.2 F | DIASTOLIC BLOOD PRESSURE: 74 MMHG | WEIGHT: 126.98 LBS | RESPIRATION RATE: 18 BRPM | OXYGEN SATURATION: 98 % | HEART RATE: 73 BPM | BODY MASS INDEX: 19.25 KG/M2

## 2022-11-18 DIAGNOSIS — G43.001 MIGRAINE WITHOUT AURA AND WITH STATUS MIGRAINOSUS, NOT INTRACTABLE: Primary | ICD-10-CM

## 2022-11-18 PROCEDURE — 25010000002 DIPHENHYDRAMINE PER 50 MG: Performed by: NURSE PRACTITIONER

## 2022-11-18 PROCEDURE — 96375 TX/PRO/DX INJ NEW DRUG ADDON: CPT

## 2022-11-18 PROCEDURE — 25010000002 KETOROLAC TROMETHAMINE PER 15 MG: Performed by: NURSE PRACTITIONER

## 2022-11-18 PROCEDURE — 99283 EMERGENCY DEPT VISIT LOW MDM: CPT

## 2022-11-18 PROCEDURE — 25010000002 METOCLOPRAMIDE PER 10 MG: Performed by: NURSE PRACTITIONER

## 2022-11-18 PROCEDURE — 96374 THER/PROPH/DIAG INJ IV PUSH: CPT

## 2022-11-18 RX ORDER — KETOROLAC TROMETHAMINE 30 MG/ML
30 INJECTION, SOLUTION INTRAMUSCULAR; INTRAVENOUS ONCE
Status: COMPLETED | OUTPATIENT
Start: 2022-11-18 | End: 2022-11-18

## 2022-11-18 RX ORDER — METOCLOPRAMIDE HYDROCHLORIDE 5 MG/ML
10 INJECTION INTRAMUSCULAR; INTRAVENOUS ONCE
Status: COMPLETED | OUTPATIENT
Start: 2022-11-18 | End: 2022-11-18

## 2022-11-18 RX ORDER — DIPHENHYDRAMINE HYDROCHLORIDE 50 MG/ML
25 INJECTION INTRAMUSCULAR; INTRAVENOUS ONCE
Status: COMPLETED | OUTPATIENT
Start: 2022-11-18 | End: 2022-11-18

## 2022-11-18 RX ADMIN — DIPHENHYDRAMINE HYDROCHLORIDE 25 MG: 50 INJECTION, SOLUTION INTRAMUSCULAR; INTRAVENOUS at 09:43

## 2022-11-18 RX ADMIN — METOCLOPRAMIDE HYDROCHLORIDE 10 MG: 5 INJECTION INTRAMUSCULAR; INTRAVENOUS at 09:42

## 2022-11-18 RX ADMIN — KETOROLAC TROMETHAMINE 30 MG: 30 INJECTION, SOLUTION INTRAMUSCULAR; INTRAVENOUS at 09:42

## 2022-11-18 NOTE — DISCHARGE INSTRUCTIONS
Rest.  Drink plenty fluids.    Take home migraine medications.    Follow-up with your neurologist for further management.    Return for new or worsening symptoms

## 2022-11-18 NOTE — ED PROVIDER NOTES
"Time: 11:13 EST  Arrived by: Vehicle  Chief Complaint: Migraine  History provided by: Patient  History is limited by: N/A    History of Present Illness:  Patient is a 37 y.o. year old female that presents to the emergency department with migraine      History provided by:  Patient  Headache  Pain location:  Generalized  Quality:  Sharp, stabbing and dull  Radiates to:  Eyes  Severity currently:  9/10  Severity at highest:  10/10  Onset quality:  Gradual  Duration:  5 days  Timing:  Constant  Progression:  Unchanged  Chronicity:  Chronic  Similar to prior headaches: yes    Context: bright light and loud noise    Relieved by:  Nothing  Worsened by:  Activity, light and sound  Ineffective treatments:  Prescription medications  Associated symptoms: ear pain, eye pain, nausea and vomiting    Associated symptoms: no abdominal pain, no back pain, no blurred vision, no congestion, no cough, no diarrhea, no dizziness, no drainage, no facial pain, no fatigue, no fever, no focal weakness, no hearing loss, no loss of balance, no myalgias, no near-syncope, no neck pain, no neck stiffness, no numbness, no paresthesias, no photophobia, no seizures, no sinus pressure, no sore throat, no swollen glands, no syncope, no tingling, no URI, no visual change and no weakness        Similar Symptoms Previously: yes  Recently seen: changed  Migraine meds from imitrex to maxalt      Patient Care Team  Primary Care Provider: zunilda martinez    Past Medical History:     Allergies   Allergen Reactions   • Bee Venom Shortness Of Breath and Swelling   • Latex Itching, Swelling and Rash     Swelling at site, rash and itching    • Metronidazole Rash   • Omeprazole Other (See Comments)     Causes thrush.   • Paroxetine Other (See Comments)     Severe migraine      Past Medical History:   Diagnosis Date   • ADHD (attention deficit hyperactivity disorder) 2000    Never treated due to anxiety being \"worse\"   • Anemia    • Anxiety    • Arthritis    • Chronic " pain disorder 2003    Back issues following childbirth,  2010 was crushed   • Colon polyp    • Condition not found 09/04/2015    left gluteus medius insufficiency   • Depression    • Disease of thyroid gland 2020    Hyperthyroidism,enlarged,moderate nodules   • Family history of malignant neoplasm in father 07/02/2021    Added automatically from request for surgery [id number]   • GERD (gastroesophageal reflux disease)    • Head injury 07/02/2021   • Hemorrhoids    • History of MRSA infection     2010- WOUND COLINIZED   • HL (hearing loss)    • Irritable bowel syndrome    • Kidney stone    • Lactose intolerance    • Migraine headache    • Obsessive-compulsive disorder 2000   • Panic disorder 2000   • PONV (postoperative nausea and vomiting)     slight nausea   • Psychiatric illness 2000   • PTSD (post-traumatic stress disorder) 2000   • Spinal headache     post epidural post child birth   • Tobacco use 02/08/2022   • Trace mitral valve regurgitation 02/08/2022   • Withdrawal symptoms, drug or narcotic (HCC) 2011    From pain medication following getting crushed and hospitali     Past Surgical History:   Procedure Laterality Date   • ABDOMINAL SURGERY  2004; 2010    Laproscopic; splenectomy and multiple organ fixations   • BONY PELVIS SURGERY     • BREAST AUGMENTATION  01/08/2020   • BREAST SURGERY  2010; 2020    Trauma/removal of breast; 2 reconstructive surgeries   • COLONOSCOPY N/A 09/01/2021    Procedure: COLONOSCOPY;  Surgeon: Haroon Collins MD;  Location: [facility] ENDOSCOPY;  Service: Gastroenterology;  Laterality: N/A;  NORMAL COLONOSCOPY   • COSMETIC SURGERY  2020    2 reconstructive breast surheries w implants   • DILATION AND CURETTAGE, DIAGNOSTIC / THERAPEUTIC  2004   • ENDOSCOPY  2007 2015   • ENDOSCOPY N/A 09/01/2021    Procedure: ESOPHAGOGASTRODUODENOSCOPY WITH BIOPSY;  Surgeon: Haroon Collins MD;  Location: [facility] ENDOSCOPY;  Service: Gastroenterology;  Laterality: N/A;  HIATAL HERNIA   • FAT  GRAFTING  01/08/2020    fat grafting to right breast    • FRACTURE SURGERY  1990; 2010    Arm; hips, leg, wrist   • HARDWARE REMOVAL  2011 2014   • HEMORRHOIDECTOMY  2014    During childbirth   • HIP ORIF W/ CAPSULOTOMY     • LUNG SURGERY     • OTHER SURGICAL HISTORY      metal implants   • SPLENECTOMY  2010   • UPPER GASTROINTESTINAL ENDOSCOPY     • WRIST SURGERY  2020     Family History   Problem Relation Age of Onset   • Heart disease Mother    • Arthritis Mother    • Anxiety disorder Mother    • Bipolar disorder Mother    • Depression Mother    • Colon cancer Father    • Cancer Father    • Arthritis Father    • Osteoporosis Father    • Heart disease Father    • Bipolar disorder Father    • Depression Father    • Colon polyps Father    • Bipolar disorder Sister    • Depression Sister    • Self-Injurious Behavior  Sister    • Hypertension Brother    • Anxiety disorder Brother    • Depression Brother    • Irritable bowel syndrome Brother    • Hypertension Brother    • Depression Brother    • Lung cancer Other    • Clotting disorder Other    • Diabetes Other    • Uterine cancer Other    • Irritable bowel syndrome Maternal Grandmother    • Colon cancer Paternal Grandmother    • Malig Hyperthermia Neg Hx        Home Medications:  Prior to Admission medications    Medication Sig Start Date End Date Taking? Authorizing Provider   cyclobenzaprine (FLEXERIL) 10 MG tablet Take 1 tablet by mouth 2 (Two) Times a Day As Needed for Muscle Spasms. 8/12/22   Bernard Freitas APRN   diclofenac (VOLTAREN) 50 MG EC tablet Take 1 tablet by mouth 2 (Two) Times a Day. 8/12/22   Bernard Freitas APRN   divalproex (DEPAKOTE ER) 250 MG 24 hr tablet Take 250 mg by mouth Daily.    Provider, MD Tyshawn   fluticasone (Flonase) 50 MCG/ACT nasal spray 2 sprays into the nostril(s) as directed by provider Daily. 9/30/22   Bernard Freitas APRN   hyoscyamine (ANASPAZ,LEVSIN) 0.125 MG tablet Take 1 tablet by mouth Every 6 (Six) Hours As Needed  for Cramping. 5/2/22   Soha Bernard APRN   lidocaine (LIDODERM) 5 % Place 1 patch on the skin as directed by provider Daily. Remove & Discard patch within 12 hours or as directed by MD 9/30/22   Bernard Freitas APRN   propranolol (INDERAL) 20 MG tablet Take 1 tablet by mouth 3 (Three) Times a Day. 1/5/22   Bernard Freitas APRN   rizatriptan (MAXALT) 10 MG tablet Take 10 mg by mouth. 1/27/22   Provider, MD Tyshawn   Viloxazine HCl ER (Qelbree) 200 MG capsule sustained-release 24 hr Take 400 mg by mouth Daily. 11/10/22   Sarita Schultz MD   vitamin D (ERGOCALCIFEROL) 1.25 MG (89939 UT) capsule capsule TAKE 1 CAPSULE BY MOUTH 1 TIME EVERY WEEK 9/28/22   Bernard Freitas APRN        Social History:   PT  reports that she has been smoking cigarettes. She has a 22.00 pack-year smoking history. She has never used smokeless tobacco. She reports current alcohol use. She reports that she does not use drugs.    Record Review:  I have reviewed the patient's records in [employer].     Review of Systems  Review of Systems   Constitutional: Negative for chills, fatigue and fever.   HENT: Positive for ear pain. Negative for congestion, hearing loss, postnasal drip, rhinorrhea, sinus pressure and sore throat.    Eyes: Positive for pain. Negative for blurred vision, photophobia and visual disturbance.   Respiratory: Negative for cough and shortness of breath.    Cardiovascular: Negative for chest pain, syncope and near-syncope.   Gastrointestinal: Positive for nausea and vomiting. Negative for abdominal pain and diarrhea.   Genitourinary: Negative for difficulty urinating.   Musculoskeletal: Negative for arthralgias, back pain, myalgias, neck pain and neck stiffness.   Skin: Negative for rash.   Neurological: Positive for headaches. Negative for dizziness, focal weakness, seizures, weakness, light-headedness, numbness, paresthesias and loss of balance.   Hematological: Negative for adenopathy.   Psychiatric/Behavioral: Negative.   "       Physical Exam  /74   Pulse 72   Temp 97.2 °F (36.2 °C) (Oral)   Resp 18   Ht 172.7 cm (68\")   Wt 57.6 kg (126 lb 15.8 oz)   LMP 11/02/2022 (Exact Date)   SpO2 98%   BMI 19.31 kg/m²     Physical Exam  Vitals and nursing note reviewed.   Constitutional:       General: She is not in acute distress.     Appearance: Normal appearance. She is not toxic-appearing.   HENT:      Head: Normocephalic and atraumatic.      Right Ear: Tympanic membrane, ear canal and external ear normal.      Left Ear: Tympanic membrane, ear canal and external ear normal.      Nose: Nose normal.      Mouth/Throat:      Mouth: Mucous membranes are moist.   Eyes:      Extraocular Movements: Extraocular movements intact.      Conjunctiva/sclera: Conjunctivae normal.      Pupils: Pupils are equal, round, and reactive to light.   Cardiovascular:      Rate and Rhythm: Normal rate and regular rhythm.      Pulses: Normal pulses.      Heart sounds: Normal heart sounds.   Pulmonary:      Effort: Pulmonary effort is normal.      Breath sounds: Normal breath sounds.   Abdominal:      Tenderness: There is no abdominal tenderness.   Musculoskeletal:         General: Normal range of motion.      Cervical back: Normal range of motion.   Lymphadenopathy:      Cervical: No cervical adenopathy.   Skin:     General: Skin is warm and dry.   Neurological:      General: No focal deficit present.      Mental Status: She is alert and oriented to person, place, and time.   Psychiatric:         Mood and Affect: Mood normal.         Behavior: Behavior normal.         Thought Content: Thought content normal.         Judgment: Judgment normal.                  ED Course  /74   Pulse 72   Temp 97.2 °F (36.2 °C) (Oral)   Resp 18   Ht 172.7 cm (68\")   Wt 57.6 kg (126 lb 15.8 oz)   LMP 11/02/2022 (Exact Date)   SpO2 98%   BMI 19.31 kg/m²   Results for orders placed or performed in visit on 08/30/22   Gardnerella vaginalis, Trichomonas vaginalis, " Candida albicans, DNA - Swab, Vagina    Specimen: Vagina; Swab   Result Value Ref Range    GARDNERELLA VAGINALIS Negative Negative    TRICHOMONAS VAGINALIS Negative Negative    CANDIDA SPECIES Negative Negative   IgP, Aptima HPV    Specimen: Cervix; ThinPrep Vial   Result Value Ref Range    Diagnosis Comment     Specimen adequacy: Comment     Clinician Provided ICD-10: Comment     Performed by: Comment     . .     Note: Comment     Method: Comment     HPV Aptima Negative Negative     Medications   ketorolac (TORADOL) injection 30 mg (30 mg Intravenous Given 11/18/22 0942)   metoclopramide (REGLAN) injection 10 mg (10 mg Intravenous Given 11/18/22 0942)   diphenhydrAMINE (BENADRYL) injection 25 mg (25 mg Intravenous Given 11/18/22 0943)     No results found.      Medical Decision Making:                     MDM  Number of Diagnoses or Management Options  Migraine without aura and with status migrainosus, not intractable  Diagnosis management comments: The patient presented to the emergency department with a headache. The patient is now resting comfortably in feels better, is alert, talkative, interactive and in no distress after ED treatment. The patient appears well and is able to tolerate PO fluids. Repeat examination is unremarkable and benign. The patient is neurologically intact, has a normal mental status, and this ambulatory in the ED. The history, exam, diagnostic testing (if any) and the patient's current condition do not suggest meningitis, stroke, sepsis, subarachnoid hemorrhage, intracranial bleeding, encephalitis, temporal arteritis or other significant pathology to warrant further testing, continued ED treatment, admission, neurological consultation, for other specialist evaluation at this point. The vital signs have been stable. The patient's condition is stable and appropriate for discharge. The patient will pursue further outpatient evaluation with the primary care physician or other designated or  consulting physician as indicated in the discharge instructions.         Amount and/or Complexity of Data Reviewed  Tests in the medicine section of CPT®: ordered and reviewed    Risk of Complications, Morbidity, and/or Mortality  Presenting problems: low  Diagnostic procedures: low  Management options: low    Patient Progress  Patient progress: stable       Final diagnoses:   Migraine without aura and with status migrainosus, not intractable        Disposition:  ED Disposition     ED Disposition   Discharge    Condition   Stable    Comment   --              Yun Weinstein, APRN  11/18/22 1110

## 2022-12-15 ENCOUNTER — HOSPITAL ENCOUNTER (OUTPATIENT)
Dept: GENERAL RADIOLOGY | Facility: HOSPITAL | Age: 37
Discharge: HOME | End: 2022-12-15
Admitting: NURSE PRACTITIONER

## 2022-12-15 DIAGNOSIS — M54.42 CHRONIC BILATERAL LOW BACK PAIN WITH LEFT-SIDED SCIATICA: ICD-10-CM

## 2022-12-15 DIAGNOSIS — G89.29 CHRONIC BILATERAL LOW BACK PAIN WITH LEFT-SIDED SCIATICA: ICD-10-CM

## 2022-12-15 PROCEDURE — 72110 X-RAY EXAM L-2 SPINE 4/>VWS: CPT

## 2023-01-10 ENCOUNTER — OFFICE VISIT (OUTPATIENT)
Dept: FAMILY MEDICINE CLINIC | Facility: CLINIC | Age: 38
End: 2023-01-10
Payer: COMMERCIAL

## 2023-01-10 VITALS
OXYGEN SATURATION: 99 % | HEIGHT: 68 IN | WEIGHT: 130 LBS | BODY MASS INDEX: 19.7 KG/M2 | DIASTOLIC BLOOD PRESSURE: 58 MMHG | HEART RATE: 82 BPM | SYSTOLIC BLOOD PRESSURE: 102 MMHG | TEMPERATURE: 97.8 F

## 2023-01-10 DIAGNOSIS — Z51.81 MEDICATION MONITORING ENCOUNTER: ICD-10-CM

## 2023-01-10 DIAGNOSIS — F41.1 GENERALIZED ANXIETY DISORDER: Primary | ICD-10-CM

## 2023-01-10 DIAGNOSIS — G62.9 NEUROPATHY: ICD-10-CM

## 2023-01-10 DIAGNOSIS — M54.6 ACUTE BILATERAL THORACIC BACK PAIN: ICD-10-CM

## 2023-01-10 DIAGNOSIS — G89.29 CHRONIC BILATERAL LOW BACK PAIN WITH LEFT-SIDED SCIATICA: Primary | ICD-10-CM

## 2023-01-10 DIAGNOSIS — M50.30 DDD (DEGENERATIVE DISC DISEASE), CERVICAL: ICD-10-CM

## 2023-01-10 DIAGNOSIS — M54.42 CHRONIC BILATERAL LOW BACK PAIN WITH LEFT-SIDED SCIATICA: Primary | ICD-10-CM

## 2023-01-10 LAB
AMPHET+METHAMPHET UR QL: NEGATIVE
AMPHETAMINE INTERNAL CONTROL: ABNORMAL
AMPHETAMINES UR QL: NEGATIVE
BARBITURATE INTERNAL CONTROL: ABNORMAL
BARBITURATES UR QL SCN: NEGATIVE
BENZODIAZ UR QL SCN: NEGATIVE
BENZODIAZEPINE INTERNAL CONTROL: ABNORMAL
BUPRENORPHINE INTERNAL CONTROL: ABNORMAL
BUPRENORPHINE SERPL-MCNC: NEGATIVE NG/ML
CANNABINOIDS SERPL QL: POSITIVE
COCAINE INTERNAL CONTROL: ABNORMAL
COCAINE UR QL: NEGATIVE
EXPIRATION DATE: ABNORMAL
Lab: ABNORMAL
MDMA (ECSTASY) INTERNAL CONTROL: ABNORMAL
MDMA UR QL SCN: NEGATIVE
METHADONE INTERNAL CONTROL: ABNORMAL
METHADONE UR QL SCN: NEGATIVE
METHAMPHETAMINE INTERNAL CONTROL: ABNORMAL
OPIATES INTERNAL CONTROL: ABNORMAL
OPIATES UR QL: NEGATIVE
OXYCODONE INTERNAL CONTROL: ABNORMAL
OXYCODONE UR QL SCN: NEGATIVE
PCP UR QL SCN: NEGATIVE
PHENCYCLIDINE INTERNAL CONTROL: ABNORMAL
THC INTERNAL CONTROL: ABNORMAL

## 2023-01-10 PROCEDURE — 99213 OFFICE O/P EST LOW 20 MIN: CPT | Performed by: NURSE PRACTITIONER

## 2023-01-10 PROCEDURE — 80305 DRUG TEST PRSMV DIR OPT OBS: CPT | Performed by: NURSE PRACTITIONER

## 2023-01-10 RX ORDER — PREGABALIN 50 MG/1
50 CAPSULE ORAL 2 TIMES DAILY
Qty: 60 CAPSULE | Refills: 2 | Status: SHIPPED | OUTPATIENT
Start: 2023-01-10 | End: 2023-02-10

## 2023-01-10 RX ORDER — SUMATRIPTAN 100 MG/1
100 TABLET, FILM COATED ORAL ONCE AS NEEDED
COMMUNITY
Start: 2022-12-28

## 2023-01-10 RX ORDER — DIVALPROEX SODIUM 250 MG/1
250 TABLET, EXTENDED RELEASE ORAL NIGHTLY
Qty: 90 TABLET | Refills: 1 | Status: SHIPPED | OUTPATIENT
Start: 2023-01-10
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
## 2023-01-10 NOTE — TELEPHONE ENCOUNTER
PT CALLED TO REQUEST MEDICATION REFILL FOR DEPAKOTE 250MG.     PT HAS UPCOMING APPT ON 02/10/2023.     MEDICATION ORDER PENDED. PLEASE REVIEW AND UPDATE SCRIPT WITH APPROPRIATE DX CODES AND DAY SUPPLY BEFORE SENDING.

## 2023-01-16 RX ORDER — CYCLOBENZAPRINE HCL 10 MG
10 TABLET ORAL 2 TIMES DAILY PRN
Qty: 60 TABLET | Refills: 1 | Status: SHIPPED | OUTPATIENT
Start: 2023-01-16

## 2023-01-18 ENCOUNTER — TELEPHONE (OUTPATIENT)
Dept: PLASTIC SURGERY | Facility: CLINIC | Age: 38
End: 2023-01-18

## 2023-01-19 ENCOUNTER — OFFICE VISIT (OUTPATIENT)
Dept: PLASTIC SURGERY | Facility: CLINIC | Age: 38
End: 2023-01-19
Payer: OTHER GOVERNMENT

## 2023-01-19 VITALS
DIASTOLIC BLOOD PRESSURE: 68 MMHG | SYSTOLIC BLOOD PRESSURE: 100 MMHG | HEIGHT: 68 IN | BODY MASS INDEX: 20.16 KG/M2 | WEIGHT: 133 LBS | HEART RATE: 100 BPM | TEMPERATURE: 98.7 F | OXYGEN SATURATION: 94 %

## 2023-01-19 DIAGNOSIS — L90.5 PAINFUL SCAR: ICD-10-CM

## 2023-01-19 DIAGNOSIS — T85.42XD MALPOSITION OF BREAST IMPLANT, SUBSEQUENT ENCOUNTER: ICD-10-CM

## 2023-01-19 DIAGNOSIS — R52 PAINFUL SCAR: ICD-10-CM

## 2023-01-19 DIAGNOSIS — Z01.818 PRE-OPERATIVE EXAMINATION: Primary | ICD-10-CM

## 2023-01-19 DIAGNOSIS — N64.4 BREAST PAIN: ICD-10-CM

## 2023-01-19 DIAGNOSIS — L91.0 HYPERTROPHIC SCAR: ICD-10-CM

## 2023-01-19 PROCEDURE — 99214 OFFICE O/P EST MOD 30 MIN: CPT | Performed by: SURGERY
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
## 2023-01-20 ENCOUNTER — PREP FOR SURGERY (OUTPATIENT)
Dept: OTHER | Facility: HOSPITAL | Age: 38
End: 2023-01-20
Payer: COMMERCIAL

## 2023-01-20 ENCOUNTER — TELEPHONE (OUTPATIENT)
Dept: PLASTIC SURGERY | Facility: CLINIC | Age: 38
End: 2023-01-20
Payer: COMMERCIAL

## 2023-01-20 DIAGNOSIS — T85.42XD MALPOSITION OF BREAST IMPLANT, SUBSEQUENT ENCOUNTER: Primary | ICD-10-CM

## 2023-01-20 RX ORDER — CEFAZOLIN SODIUM 2 G/100ML
2 INJECTION, SOLUTION INTRAVENOUS ONCE
Status: CANCELLED | OUTPATIENT
Start: 2023-01-20 | End: 2023-01-20

## 2023-01-27 ENCOUNTER — PATIENT MESSAGE (OUTPATIENT)
Dept: FAMILY MEDICINE CLINIC | Facility: CLINIC | Age: 38
End: 2023-01-27
Payer: COMMERCIAL

## 2023-01-27 DIAGNOSIS — R00.2 PALPITATIONS: Primary | ICD-10-CM

## 2023-02-03 ENCOUNTER — LAB (OUTPATIENT)
Dept: LAB | Facility: HOSPITAL | Age: 38
End: 2023-02-03
Payer: OTHER GOVERNMENT

## 2023-02-03 DIAGNOSIS — Z01.818 PRE-OPERATIVE EXAMINATION: ICD-10-CM

## 2023-02-03 LAB — COTININE UR-MCNC: NEGATIVE NG/ML

## 2023-02-03 PROCEDURE — G0480 DRUG TEST DEF 1-7 CLASSES: HCPCS

## 2023-02-10 ENCOUNTER — OFFICE VISIT (OUTPATIENT)
Dept: PSYCHIATRY | Facility: CLINIC | Age: 38
End: 2023-02-10
Payer: OTHER GOVERNMENT

## 2023-02-10 ENCOUNTER — OFFICE VISIT (OUTPATIENT)
Dept: FAMILY MEDICINE CLINIC | Facility: CLINIC | Age: 38
End: 2023-02-10
Payer: OTHER GOVERNMENT

## 2023-02-10 VITALS
OXYGEN SATURATION: 99 % | BODY MASS INDEX: 20.95 KG/M2 | WEIGHT: 138.2 LBS | TEMPERATURE: 98.3 F | HEART RATE: 75 BPM | HEIGHT: 68 IN | SYSTOLIC BLOOD PRESSURE: 98 MMHG | DIASTOLIC BLOOD PRESSURE: 64 MMHG

## 2023-02-10 VITALS
BODY MASS INDEX: 21.07 KG/M2 | DIASTOLIC BLOOD PRESSURE: 65 MMHG | HEIGHT: 68 IN | HEART RATE: 76 BPM | WEIGHT: 139 LBS | SYSTOLIC BLOOD PRESSURE: 112 MMHG

## 2023-02-10 DIAGNOSIS — Z51.81 MEDICATION MONITORING ENCOUNTER: ICD-10-CM

## 2023-02-10 DIAGNOSIS — E04.1 THYROID NODULE: ICD-10-CM

## 2023-02-10 DIAGNOSIS — F33.1 MAJOR DEPRESSIVE DISORDER, RECURRENT EPISODE, MODERATE: ICD-10-CM

## 2023-02-10 DIAGNOSIS — S06.9X9D TRAUMATIC BRAIN INJURY WITH LOSS OF CONSCIOUSNESS, SUBSEQUENT ENCOUNTER: ICD-10-CM

## 2023-02-10 DIAGNOSIS — G62.9 NEUROPATHY: Primary | ICD-10-CM

## 2023-02-10 DIAGNOSIS — F51.05 INSOMNIA DUE TO MENTAL CONDITION: ICD-10-CM

## 2023-02-10 DIAGNOSIS — M50.30 DDD (DEGENERATIVE DISC DISEASE), CERVICAL: ICD-10-CM

## 2023-02-10 DIAGNOSIS — F90.0 ADHD (ATTENTION DEFICIT HYPERACTIVITY DISORDER), INATTENTIVE TYPE: Primary | ICD-10-CM

## 2023-02-10 DIAGNOSIS — M54.42 CHRONIC BILATERAL LOW BACK PAIN WITH LEFT-SIDED SCIATICA: ICD-10-CM

## 2023-02-10 DIAGNOSIS — F43.10 POST TRAUMATIC STRESS DISORDER (PTSD): ICD-10-CM

## 2023-02-10 DIAGNOSIS — E55.9 VITAMIN D DEFICIENCY: ICD-10-CM

## 2023-02-10 DIAGNOSIS — F41.0 PANIC ATTACKS: ICD-10-CM

## 2023-02-10 DIAGNOSIS — F41.1 GENERALIZED ANXIETY DISORDER: ICD-10-CM

## 2023-02-10 DIAGNOSIS — G89.29 CHRONIC BILATERAL LOW BACK PAIN WITH LEFT-SIDED SCIATICA: ICD-10-CM

## 2023-02-10 LAB
25(OH)D3 SERPL-MCNC: 30.1 NG/ML (ref 30–100)
ALBUMIN SERPL-MCNC: 4.9 G/DL (ref 3.5–5.2)
ALBUMIN/GLOB SERPL: 2 G/DL
ALP SERPL-CCNC: 62 U/L (ref 39–117)
ALT SERPL W P-5'-P-CCNC: 13 U/L (ref 1–33)
ANION GAP SERPL CALCULATED.3IONS-SCNC: 7.4 MMOL/L (ref 5–15)
AST SERPL-CCNC: 17 U/L (ref 1–32)
BILIRUB SERPL-MCNC: 0.4 MG/DL (ref 0–1.2)
BUN SERPL-MCNC: 12 MG/DL (ref 6–20)
BUN/CREAT SERPL: 17.1 (ref 7–25)
CALCIUM SPEC-SCNC: 10.1 MG/DL (ref 8.6–10.5)
CHLORIDE SERPL-SCNC: 101 MMOL/L (ref 98–107)
CO2 SERPL-SCNC: 29.6 MMOL/L (ref 22–29)
CREAT SERPL-MCNC: 0.7 MG/DL (ref 0.57–1)
EGFRCR SERPLBLD CKD-EPI 2021: 114.4 ML/MIN/1.73
GLOBULIN UR ELPH-MCNC: 2.4 GM/DL
GLUCOSE SERPL-MCNC: 102 MG/DL (ref 65–99)
POTASSIUM SERPL-SCNC: 4.2 MMOL/L (ref 3.5–5.2)
PROT SERPL-MCNC: 7.3 G/DL (ref 6–8.5)
SODIUM SERPL-SCNC: 138 MMOL/L (ref 136–145)
T4 FREE SERPL-MCNC: 1.17 NG/DL (ref 0.93–1.7)
TSH SERPL DL<=0.05 MIU/L-ACNC: 2.2 UIU/ML (ref 0.27–4.2)

## 2023-02-10 PROCEDURE — 82306 VITAMIN D 25 HYDROXY: CPT | Performed by: NURSE PRACTITIONER

## 2023-02-10 PROCEDURE — 99214 OFFICE O/P EST MOD 30 MIN: CPT | Performed by: STUDENT IN AN ORGANIZED HEALTH CARE EDUCATION/TRAINING PROGRAM

## 2023-02-10 PROCEDURE — 99214 OFFICE O/P EST MOD 30 MIN: CPT | Performed by: NURSE PRACTITIONER

## 2023-02-10 PROCEDURE — 90833 PSYTX W PT W E/M 30 MIN: CPT | Performed by: STUDENT IN AN ORGANIZED HEALTH CARE EDUCATION/TRAINING PROGRAM

## 2023-02-10 PROCEDURE — 36415 COLL VENOUS BLD VENIPUNCTURE: CPT | Performed by: NURSE PRACTITIONER

## 2023-02-10 PROCEDURE — 80053 COMPREHEN METABOLIC PANEL: CPT | Performed by: NURSE PRACTITIONER

## 2023-02-10 PROCEDURE — 84443 ASSAY THYROID STIM HORMONE: CPT | Performed by: NURSE PRACTITIONER

## 2023-02-10 PROCEDURE — 84439 ASSAY OF FREE THYROXINE: CPT | Performed by: NURSE PRACTITIONER

## 2023-02-10 RX ORDER — GABAPENTIN 300 MG/1
300 CAPSULE ORAL 2 TIMES DAILY
Qty: 60 CAPSULE | Refills: 2 | Status: SHIPPED | OUTPATIENT
Start: 2023-02-10

## 2023-02-10 RX ORDER — GUANFACINE 1 MG/1
1 TABLET, EXTENDED RELEASE ORAL NIGHTLY
Qty: 30 TABLET | Refills: 5 | Status: SHIPPED | OUTPATIENT
Start: 2023-02-10 | End: 2023-03-09

## 2023-02-10 NOTE — PROGRESS NOTES
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 recent psychosocial stressors (pandemic). Protective factors include no family history, no present SI, no history of suicide attempts or self-harm in the past, healthcare seeking, future orientation, willingness to engage in care.  Risk of self-harm chronically is also moderate, but could be further elevated in the event of treatment noncompliance and/or AODA.  59. Meds:  a. AGREE with propranolol 20 tid.  b. STOPPED qelbree 200 to 400 mg daily. HA. 2/23.  c. START guanfacine ER 1 mg nightly. Risks, benefits, alternatives discussed with patient and mom including sedation, dizziness/falls risk, GI upset, dry mouth, constipation, hypotension. After discussion of these risks and benefits, the patient and mom voiced understanding and agreed to proceed.  d. SHE REDUCED Depakote 500 to 250 mg p.o. nightly. Risks, benefits, alternatives discussed with patient including nausea and vomiting, GI upset, sedation, dizziness/falls risk, increased appetite. After discussion of these risks and benefits, the patient voiced understanding and agreed to proceed. Patient also informed of the need to take as prescribed, and for periodic labwork.  e. S/P:  i. seroquel 100 mg  nightly. Crazy dreams.  ii. Wellbutrin  mg daily.   60. Labs: UDS pos for thc 1/2023    Patient screened positive for depression based on a PHQ-9 score of  on . Follow-up recommendations include: Suicide Risk Assessment performed.           TREATMENT PLAN/GOALS: Continue supportive psychotherapy efforts and medications as indicated. Treatment and medication options discussed during today's visit. Patient acknowledged and verbally consented to continue with current treatment plan and was educated on the importance of compliance with treatment and follow-up appointments.    MEDICATION ISSUES:  MAURA reviewed as expected.  Discussed medication options and treatment plan of prescribed medication as well as the risks, benefits, and side effects including potential falls, possible impaired driving and metabolic adversities among others. Patient is agreeable to call the office with any worsening of symptoms or onset of side effects. Patient is agreeable to call 911 or go to the nearest ER should he/she begin having SI/HI. No medication side effects or related complaints today.     MEDS ORDERED DURING VISIT:  New Medications Ordered This Visit   Medications   • guanFACINE HCl ER (INTUNIV) 1 MG tablet sustained-release 24 hour     Sig: Take 1 mg by mouth Every Night.     Dispense:  30 tablet     Refill:  5       Return in about 6 weeks (around 3/24/2023).         This document has been electronically signed by Sarita Schultz MD  February 10, 2023 09:04 EST      Part of this note may be an electronic transcription/translation of spoken language to printed text using the Dragon Dictation System.

## 2023-02-10 NOTE — PATIENT INSTRUCTIONS
1.  Please return to clinic at your next scheduled visit.  Contact the clinic ([phone]) at least 24 hours prior in the event you need to cancel.  2.  Do no harm to yourself or others.    3.  Avoid alcohol and drugs.    4.  Take all medications as prescribed.  Please contact the clinic with any concerns. If you are in need of medication refills, please call the clinic at [phone].    5. Should you want to get in touch with your provider, Dr. Sarita Schultz, please utilize [employer] or contact the office ([phone]), and staff will be able to page Dr. Schultz directly.  6.  In the event you have personal crisis, contact the following crisis numbers: Suicide Prevention Hotline 1-336.883.8627; [facility] Helpline 4-014-412-NRYR; [facility] Emergency Room [phone]; text HELLO to 399607; or 045.     SPECIFIC RECOMMENDATIONS:     1.      Medications discussed at this encounter:                   - stop qelbree and start guanfacine     2.      Psychotherapy recommendations:

## 2023-02-10 NOTE — PROGRESS NOTES
"Chief Complaint  Follow-up  Neck pain  Subjective         Seda STEARNS presents to [facility]  HPI   Presents today for follow-up on cervical neck pain and low back pain.  She has been going to physical therapy.  She has noted some improvement with her pain with physical therapy.  Insurance will not cover Lyrica.  Currently taking Flexeril as needed and diclofenac 50 mg twice daily as needed.    She has a history of a thyroid nodule.  1 year ago she had a thyroid ultrasound and recommended a 12-month follow-up.  T RADS 4    Social History     Socioeconomic History   • Marital status:    Tobacco Use   • Smoking status: Former     Packs/day: 1.00     Years: 22.00     Pack years: 22.00     Types: Cigarettes     Quit date: 2023     Years since quittin.0   • Smokeless tobacco: Never   • Tobacco comments:     I am tryin to work towards quiting on my own; in the future   Vaping Use   • Vaping Use: Former   • Substances: Nicotine, CBD, Flavoring   • Devices: Pre-filled pod   Substance and Sexual Activity   • Alcohol use: Yes     Comment: Socially, occasionally   • Drug use: Never   • Sexual activity: Yes     Partners: Male     Birth control/protection: Rhythm        Objective     Vitals:    02/10/23 1447   BP: 98/64   Pulse: 75   Temp: 98.3 °F (36.8 °C)   SpO2: 99%   Weight: 62.7 kg (138 lb 3.2 oz)   Height: 172.7 cm (68\")        Body mass index is 21.01 kg/m².    Wt Readings from Last 3 Encounters:   23 62.6 kg (138 lb)   02/10/23 62.7 kg (138 lb 3.2 oz)   02/10/23 63 kg (139 lb)       BP Readings from Last 3 Encounters:   23 101/57   02/10/23 98/64   02/10/23 112/65         Physical Exam  Vitals reviewed.   Constitutional:       Appearance: Normal appearance. She is well-developed.   HENT:      Head: Normocephalic and atraumatic.      Right Ear: External ear normal.      Left Ear: External ear normal.      Mouth/Throat:      Pharynx: No oropharyngeal exudate. "   Eyes:      Conjunctiva/sclera: Conjunctivae normal.      Pupils: Pupils are equal, round, and reactive to light.   Neck:      Thyroid: No thyroid mass or thyroid tenderness.   Cardiovascular:      Rate and Rhythm: Normal rate and regular rhythm.      Heart sounds: No murmur heard.    No friction rub. No gallop.   Pulmonary:      Effort: Pulmonary effort is normal.      Breath sounds: Normal breath sounds. No wheezing or rhonchi.   Abdominal:      General: Bowel sounds are normal. There is no distension.      Palpations: Abdomen is soft.      Tenderness: There is no abdominal tenderness.   Lymphadenopathy:      Cervical: No cervical adenopathy.   Skin:     General: Skin is warm and dry.   Neurological:      Mental Status: She is alert and oriented to person, place, and time.   Psychiatric:         Mood and Affect: Mood and affect normal.         Behavior: Behavior normal.         Thought Content: Thought content normal.         Judgment: Judgment normal.          Result Review :   The following data was reviewed by: ALDAIR Bey on 02/10/2023:      Procedures    Assessment and Plan   Diagnoses and all orders for this visit:    1. Neuropathy (Primary)  -     gabapentin (NEURONTIN) 300 MG capsule; Take 1 capsule by mouth 2 (Two) Times a Day.  Dispense: 60 capsule; Refill: 2    2. DDD (degenerative disc disease), cervical  -     gabapentin (NEURONTIN) 300 MG capsule; Take 1 capsule by mouth 2 (Two) Times a Day.  Dispense: 60 capsule; Refill: 2    3. Chronic bilateral low back pain with left-sided sciatica  -     gabapentin (NEURONTIN) 300 MG capsule; Take 1 capsule by mouth 2 (Two) Times a Day.  Dispense: 60 capsule; Refill: 2    4. Thyroid nodule  -     US Thyroid; Future  -     TSH+Free T4    5. Medication monitoring encounter  -     Comprehensive Metabolic Panel    6. Vitamin D deficiency  -     Vitamin D,25-Hydroxy    For the nerve pain neuropathy and chronic neck and back pain will prescribe gabapentin  300 mg take 1 tablet daily for the first week.  If needed may increase frequency to twice weekly.  UDS is appropriate.  Ronan reviewed.  We will order a thyroid ultrasound for follow-up on thyroid nodule.  Check a TSH free T4.  Also has a history of vitamin D deficiency.  Check vitamin D level today.    Risk and benefits of gabapentin discussed including side effect of increase somnolence/fatigue, misuse, abuse, risk of dependence, or diversion.  Need for routine monitoring discussed with patient.    BMI is within normal parameters. No other follow-up for BMI required.        Follow Up   Return in about 6 weeks (around 3/24/2023), or if symptoms worsen or fail to improve, for Next scheduled follow up.  Patient was given instructions and counseling regarding her condition or for health maintenance advice. Please see specific information pulled into the AVS if appropriate.     Please note that portions of this note were completed with a voice recognition program.

## 2023-02-12 NOTE — PROGRESS NOTES
[facility]  Cardiology progress Note    Patient Name: Seda STEARNS  : 1985    CHIEF COMPLAINT  Palpitations        Subjective   Subjective     HISTORY OF PRESENT ILLNESS    Seda STEARNS is a 37 y.o. female with history of palpitations.  She has some occasional palpitations.  No syncopal or presyncopal episode.    REVIEW OF SYSTEMS    Constitutional:    No fever, no weight loss  Skin:     No rash  Otolaryngeal:    No difficulty swallowing  Cardiovascular: See HPI.  Pulmonary:    No cough, no sputum production    Personal History     Social History:    reports that she quit smoking about 4 weeks ago. Her smoking use included cigarettes. She has a 22.00 pack-year smoking history. She has never used smokeless tobacco. She reports current alcohol use. She reports that she does not use drugs.    Home Medications:  Current Outpatient Medications on File Prior to Visit   Medication Sig   • cyclobenzaprine (FLEXERIL) 10 MG tablet Take 1 tablet by mouth 2 (Two) Times a Day As Needed for Muscle Spasms.   • diclofenac (VOLTAREN) 50 MG EC tablet Take 1 tablet by mouth 2 (Two) Times a Day.   • divalproex (DEPAKOTE ER) 250 MG 24 hr tablet Take 1 tablet by mouth Every Night.   • fluticasone (Flonase) 50 MCG/ACT nasal spray 2 sprays into the nostril(s) as directed by provider Daily.   • gabapentin (NEURONTIN) 300 MG capsule Take 1 capsule by mouth 2 (Two) Times a Day.   • guanFACINE HCl ER (INTUNIV) 1 MG tablet sustained-release 24 hour Take 1 mg by mouth Every Night.   • hyoscyamine (ANASPAZ,LEVSIN) 0.125 MG tablet Take 1 tablet by mouth Every 6 (Six) Hours As Needed for Cramping.   • SUMAtriptan (IMITREX) 100 MG tablet Take 100 mg by mouth.   • vitamin D (ERGOCALCIFEROL) 1.25 MG (41662 UT) capsule capsule TAKE 1 CAPSULE BY MOUTH 1 TIME EVERY WEEK   • [DISCONTINUED] propranolol (INDERAL) 20 MG tablet Take 1 tablet by mouth 3 (Three) Times a Day.     No current facility-administered medications on file prior  "to visit.       Past Medical History:   Diagnosis Date   • ADHD (attention deficit hyperactivity disorder) 2000    Never treated due to anxiety being \"worse\"   • Anemia    • Anxiety    • Arthritis    • Chronic pain disorder 2003    Back issues following childbirth,  2010 was crushed   • Colon polyp    • Condition not found 09/04/2015    left gluteus medius insufficiency   • Depression    • Disease of thyroid gland 2020    Hyperthyroidism,enlarged,moderate nodules   • Family history of malignant neoplasm in father 07/02/2021    Added automatically from request for surgery [id number]   • GERD (gastroesophageal reflux disease)    • Head injury 07/02/2021   • Hemorrhoids    • History of MRSA infection     2010- WOUND COLINIZED   • HL (hearing loss)    • Irritable bowel syndrome    • Kidney stone    • Lactose intolerance    • Migraine headache    • Obsessive-compulsive disorder 2000   • Panic disorder 2000   • PONV (postoperative nausea and vomiting)     slight nausea   • Psychiatric illness 2000   • PTSD (post-traumatic stress disorder) 2000   • Spinal headache     post epidural post child birth   • Tobacco use 02/08/2022   • Trace mitral valve regurgitation 02/08/2022   • Withdrawal symptoms, drug or narcotic (HCC) 2011    From pain medication following getting crushed and hospitali       Allergies:  Allergies   Allergen Reactions   • Bee Venom Shortness Of Breath and Swelling   • Latex Itching, Swelling and Rash     Swelling at site, rash and itching    • Metronidazole Rash   • Omeprazole Other (See Comments)     Causes thrush.   • Paroxetine Other (See Comments)     Severe migraine        Objective    Objective       Vitals:   Heart Rate:  [72] 72  BP: (101)/(57) 101/57  Body mass index is 20.98 kg/m².     PHYSICAL EXAM:    General Appearance:   · well developed  · well nourished  HENT:   · oropharynx moist  · lips not cyanotic  Neck:  · thyroid not enlarged  · supple  Respiratory:  · no respiratory distress  · normal " breath sounds  · no rales  Cardiovascular:  · no jugular venous distention  · regular rhythm  · apical impulse normal  · S1 normal, S2 normal  · no S3, no S4   · no murmur  · no rub, no thrill  · carotid pulses normal; no bruit  · pedal pulses normal  · lower extremity edema: none    Skin:   · warm, dry  Psychiatric:  · judgement and insight appropriate  · normal mood and affect        Result Review:  I have personally reviewed the available results from  [x]  Laboratory  [x]  EKG  [x]  Cardiology  [x]  Medications  [x]  Old records  []  Other:     Procedures  Results for orders placed in visit on 01/25/22    Adult Transthoracic Echo Complete W/ Cont if Necessary Per Protocol    Interpretation Summary  Normal left ventricular systolic function.  Trace MR and trace TR.     Impression/Plan:  1. Palpitations: Change propranolol to Toprol-XL 25 mg twice a day and see if this improves her palpitations.  Echocardiogram within normal limits.  Holter monitoring showed no significant arrhythmias.           Cirilo Chong MD   02/14/23   13:10 EST

## 2023-02-14 ENCOUNTER — OFFICE VISIT (OUTPATIENT)
Dept: CARDIOLOGY | Facility: CLINIC | Age: 38
End: 2023-02-14
Payer: OTHER GOVERNMENT

## 2023-02-14 VITALS
HEART RATE: 72 BPM | DIASTOLIC BLOOD PRESSURE: 57 MMHG | SYSTOLIC BLOOD PRESSURE: 101 MMHG | BODY MASS INDEX: 20.92 KG/M2 | HEIGHT: 68 IN | WEIGHT: 138 LBS

## 2023-02-14 DIAGNOSIS — R00.2 PALPITATIONS: Primary | ICD-10-CM

## 2023-02-14 PROCEDURE — 99213 OFFICE O/P EST LOW 20 MIN: CPT | Performed by: SPECIALIST

## 2023-02-14 RX ORDER — METOPROLOL SUCCINATE 25 MG/1
25 TABLET, EXTENDED RELEASE ORAL 2 TIMES DAILY
Qty: 180 TABLET | Refills: 3 | Status: SHIPPED | OUTPATIENT
Start: 2023-02-14

## 2023-02-20 ENCOUNTER — HOSPITAL ENCOUNTER (OUTPATIENT)
Dept: ULTRASOUND IMAGING | Facility: HOSPITAL | Age: 38
Discharge: HOME | End: 2023-02-20
Admitting: NURSE PRACTITIONER
Payer: OTHER GOVERNMENT

## 2023-02-20 DIAGNOSIS — E04.1 THYROID NODULE: ICD-10-CM

## 2023-02-20 PROCEDURE — 76536 US EXAM OF HEAD AND NECK: CPT

## 2023-03-02 ENCOUNTER — OFFICE VISIT (OUTPATIENT)
Dept: FAMILY MEDICINE CLINIC | Facility: CLINIC | Age: 38
End: 2023-03-02
Payer: OTHER GOVERNMENT

## 2023-03-02 VITALS
WEIGHT: 141.6 LBS | HEIGHT: 68 IN | TEMPERATURE: 98 F | SYSTOLIC BLOOD PRESSURE: 98 MMHG | HEART RATE: 84 BPM | DIASTOLIC BLOOD PRESSURE: 60 MMHG | OXYGEN SATURATION: 98 % | BODY MASS INDEX: 21.46 KG/M2

## 2023-03-02 DIAGNOSIS — R53.83 FATIGUE, UNSPECIFIED TYPE: Primary | ICD-10-CM

## 2023-03-02 DIAGNOSIS — R42 DIZZINESS: ICD-10-CM

## 2023-03-02 PROCEDURE — 99213 OFFICE O/P EST LOW 20 MIN: CPT | Performed by: NURSE PRACTITIONER

## 2023-03-02 NOTE — PROGRESS NOTES
"Chief Complaint  Fatigue, Dizziness, and Chest Pressure    Subjective         Seda Mobley presents to [facility]  HPI   Presents today for an acute visit for dizziness and fatigue.  She recently had medication changes.  She was started on gabapentin 300 mg twice daily as needed for neck pain and neuropathy.  She was also recently started on Intuniv 1 mg daily by Dr. Schultz.  She also was recently switched from propranolol to metoprolol succinate.    Social History     Socioeconomic History   • Marital status:    Tobacco Use   • Smoking status: Former     Packs/day: 1.00     Years: 22.00     Pack years: 22.00     Types: Cigarettes     Quit date: 2023     Years since quittin.1   • Smokeless tobacco: Never   • Tobacco comments:     I am tryin to work towards quiting on my own; in the future   Vaping Use   • Vaping Use: Every day   • Substances: CBD, Flavoring   • Devices: Pre-filled pod   Substance and Sexual Activity   • Alcohol use: Yes     Comment: Socially, occasionally   • Drug use: Never   • Sexual activity: Yes     Partners: Male     Birth control/protection: Rhythm        Objective     Vitals:    23 1313   BP: 98/60   BP Location: Left arm   Patient Position: Sitting   Cuff Size: Adult   Pulse: 84   Temp: 98 °F (36.7 °C)   TempSrc: Oral   SpO2: 98%   Weight: 64.2 kg (141 lb 9.6 oz)   Height: 172.7 cm (68\")        Body mass index is 21.53 kg/m².    Wt Readings from Last 3 Encounters:   23 64.2 kg (141 lb 9.6 oz)   23 62.6 kg (138 lb)   02/10/23 62.7 kg (138 lb 3.2 oz)       BP Readings from Last 3 Encounters:   23 98/60   23 101/57   02/10/23 98/64         Physical Exam  Vitals reviewed.   Constitutional:       Appearance: Normal appearance. She is well-developed.   HENT:      Head: Normocephalic and atraumatic.      Right Ear: External ear normal.      Left Ear: External ear normal.      Mouth/Throat:      Pharynx: No " oropharyngeal exudate.   Eyes:      Conjunctiva/sclera: Conjunctivae normal.      Pupils: Pupils are equal, round, and reactive to light.   Cardiovascular:      Rate and Rhythm: Normal rate and regular rhythm.      Heart sounds: No murmur heard.    No friction rub. No gallop.   Pulmonary:      Effort: Pulmonary effort is normal.      Breath sounds: Normal breath sounds. No wheezing or rhonchi.   Abdominal:      General: Bowel sounds are normal. There is no distension.      Palpations: Abdomen is soft.      Tenderness: There is no abdominal tenderness.   Skin:     General: Skin is warm and dry.   Neurological:      Mental Status: She is alert and oriented to person, place, and time.   Psychiatric:         Mood and Affect: Mood and affect normal.         Behavior: Behavior normal.         Thought Content: Thought content normal.         Judgment: Judgment normal.          Result Review :   The following data was reviewed by: ALDAIR Bey on 03/02/2023:      Procedures    Assessment and Plan   Diagnoses and all orders for this visit:    1. Fatigue, unspecified type (Primary)    2. Dizziness    Discussed with patient the most likelihood of the fatigue and dizziness is medication related.  Both Intuniv and gabapentin can cause fatigue and drowsiness.  Instructed patient to stop the Intuniv first.  If symptoms do resolve then call Dr. Schultz to notify him that she did not tolerate the medication.  If symptoms do not resolve after stopping Intuniv decrease the frequency of gabapentin and stop the medication.  If symptoms do not resolve with stopping both the Intuniv and gabapentin to follow back up in office.      BMI is within normal parameters. No other follow-up for BMI required.        Follow Up   Return if symptoms worsen or fail to improve.  Patient was given instructions and counseling regarding her condition or for health maintenance advice. Please see specific information pulled into the AVS if appropriate.      Please note that portions of this note were completed with a voice recognition program.

## 2023-03-07 PROBLEM — T85.42XA MALPOSITION OF BREAST IMPLANT: Status: ACTIVE | Noted: 2023-03-07

## 2023-03-07 NOTE — PROGRESS NOTES
"Pre-op Exam (Pre op 3/15/23)            History of Present Illness  Seda Mobley is a 37 y.o. female who presents to [facility] for Pre-Operative Examination for   BREAST CAPSULOTOMY 3/15/23.    Pt presents today for pre op.    Pt states no issues to discuss.    Subjective        Bee venom, Latex, Metronidazole, Omeprazole, and Paroxetine  Allergies Reconciled.    Review of Systems   All review of system has been reviewed and it  is negative except the ones note above.     Objective     /75 (BP Location: Left arm, Patient Position: Sitting, Cuff Size: Adult)   Pulse 89   Temp 98 °F (36.7 °C) (Temporal)   Ht 172.7 cm (67.99\")   Wt 64.4 kg (142 lb)   SpO2 99%   BMI 21.60 kg/m²     Body mass index is 21.6 kg/m².    Physical Exam   Cardiovascular: Normal rate.     Pulmonary/Chest  Effort normal.   Breast  No interval change on breast examination.  The right breast implant/inframammary fold is approximately 1-1/2 cm lower than the left.      Result Review :                Assessment and Plan      Diagnoses and all orders for this visit:    1. Pre-operative examination (Primary)  -     promethazine (PHENERGAN) 25 MG tablet; Take 1 tablet by mouth Every 6 (Six) Hours As Needed for Nausea or Vomiting.  Dispense: 20 tablet; Refill: 0  -     oxyCODONE-acetaminophen (Percocet) 5-325 MG per tablet; Take 1 tablet by mouth Every 6 (Six) Hours As Needed for Moderate Pain.  Dispense: 28 tablet; Refill: 0  -     Nicotine Screen, Urine - Urine, Clean Catch; Future  -     cephalexin (KEFLEX) 500 MG capsule; Take 1 capsule by mouth 4 (Four) Times a Day for 5 days.  Dispense: 20 capsule; Refill: 0        Plan:  Right breast capsulorrhaphy    Plication of the inferior portion of the capsule to raise the inframammary fold was discussed with the patient.  The risks and benefits were described.  The risk described included, but not limited to, infection of the implant, " recurrent malposition. Her questions were answered and she still agrees to the procedure.    Post op medication sent to pharmacy.  Discussed upcoming procedure.  Pt aware of Nicotine test today      Scribed by Zuleima Carrillo, acting as a scribe for Zia Fragoso MD, 03/09/23 10:23 EST.  Zia Fragoso MD's signature on the note affirms that the note adequately documents the care provided.    Follow Up     Return pt has post op.    Patient was given instructions and counseling regarding her condition. Please see specific information pulled into the AVS if appropriate.     Zia Fragoso MD  03/09/2023

## 2023-03-07 NOTE — H&P (VIEW-ONLY)
"Pre-op Exam (Pre op 3/15/23)            History of Present Illness  Seda Mobley is a 37 y.o. female who presents to [facility] for Pre-Operative Examination for   BREAST CAPSULOTOMY 3/15/23.    Pt presents today for pre op.    Pt states no issues to discuss.    Subjective        Bee venom, Latex, Metronidazole, Omeprazole, and Paroxetine  Allergies Reconciled.    Review of Systems   All review of system has been reviewed and it  is negative except the ones note above.     Objective     /75 (BP Location: Left arm, Patient Position: Sitting, Cuff Size: Adult)   Pulse 89   Temp 98 °F (36.7 °C) (Temporal)   Ht 172.7 cm (67.99\")   Wt 64.4 kg (142 lb)   SpO2 99%   BMI 21.60 kg/m²     Body mass index is 21.6 kg/m².    Physical Exam   Cardiovascular: Normal rate.     Pulmonary/Chest  Effort normal.   Breast  No interval change on breast examination.  The right breast implant/inframammary fold is approximately 1-1/2 cm lower than the left.      Result Review :                Assessment and Plan      Diagnoses and all orders for this visit:    1. Pre-operative examination (Primary)  -     promethazine (PHENERGAN) 25 MG tablet; Take 1 tablet by mouth Every 6 (Six) Hours As Needed for Nausea or Vomiting.  Dispense: 20 tablet; Refill: 0  -     oxyCODONE-acetaminophen (Percocet) 5-325 MG per tablet; Take 1 tablet by mouth Every 6 (Six) Hours As Needed for Moderate Pain.  Dispense: 28 tablet; Refill: 0  -     Nicotine Screen, Urine - Urine, Clean Catch; Future  -     cephalexin (KEFLEX) 500 MG capsule; Take 1 capsule by mouth 4 (Four) Times a Day for 5 days.  Dispense: 20 capsule; Refill: 0        Plan:  Right breast capsulorrhaphy    Plication of the inferior portion of the capsule to raise the inframammary fold was discussed with the patient.  The risks and benefits were described.  The risk described included, but not limited to, infection of the implant, " recurrent malposition. Her questions were answered and she still agrees to the procedure.    Post op medication sent to pharmacy.  Discussed upcoming procedure.  Pt aware of Nicotine test today      Scribed by Zuleima Carrillo, acting as a scribe for Zia Fragoso MD, 03/09/23 10:23 EST.  Zia Fragoso MD's signature on the note affirms that the note adequately documents the care provided.    Follow Up     Return pt has post op.    Patient was given instructions and counseling regarding her condition. Please see specific information pulled into the AVS if appropriate.     Zia Fragoso MD  03/09/2023

## 2023-03-09 ENCOUNTER — LAB (OUTPATIENT)
Dept: LAB | Facility: HOSPITAL | Age: 38
End: 2023-03-09
Payer: OTHER GOVERNMENT

## 2023-03-09 ENCOUNTER — OFFICE VISIT (OUTPATIENT)
Dept: PLASTIC SURGERY | Facility: CLINIC | Age: 38
End: 2023-03-09
Payer: OTHER GOVERNMENT

## 2023-03-09 VITALS
OXYGEN SATURATION: 99 % | HEIGHT: 68 IN | HEART RATE: 89 BPM | TEMPERATURE: 98 F | WEIGHT: 142 LBS | BODY MASS INDEX: 21.52 KG/M2 | SYSTOLIC BLOOD PRESSURE: 114 MMHG | DIASTOLIC BLOOD PRESSURE: 75 MMHG

## 2023-03-09 DIAGNOSIS — Z01.818 PRE-OPERATIVE EXAMINATION: Primary | ICD-10-CM

## 2023-03-09 DIAGNOSIS — Z01.818 PRE-OPERATIVE EXAMINATION: ICD-10-CM

## 2023-03-09 LAB — COTININE UR-MCNC: NEGATIVE NG/ML

## 2023-03-09 PROCEDURE — G0480 DRUG TEST DEF 1-7 CLASSES: HCPCS

## 2023-03-09 PROCEDURE — 99213 OFFICE O/P EST LOW 20 MIN: CPT | Performed by: SURGERY

## 2023-03-09 RX ORDER — OXYCODONE HYDROCHLORIDE AND ACETAMINOPHEN 5; 325 MG/1; MG/1
1 TABLET ORAL EVERY 6 HOURS PRN
Qty: 28 TABLET | Refills: 0 | Status: SHIPPED | OUTPATIENT
Start: 2023-03-09 | End: 2023-03-30

## 2023-03-09 RX ORDER — SULFAMETHOXAZOLE AND TRIMETHOPRIM 800; 160 MG/1; MG/1
1 TABLET ORAL 2 TIMES DAILY
Qty: 20 TABLET | Refills: 0 | Status: CANCELLED | OUTPATIENT
Start: 2023-03-09 | End: 2023-03-19

## 2023-03-09 RX ORDER — PROMETHAZINE HYDROCHLORIDE 25 MG/1
25 TABLET ORAL EVERY 6 HOURS PRN
Qty: 20 TABLET | Refills: 0 | Status: SHIPPED | OUTPATIENT
Start: 2023-03-09 | End: 2023-03-30

## 2023-03-09 RX ORDER — CEPHALEXIN 500 MG/1
500 CAPSULE ORAL 4 TIMES DAILY
Qty: 20 CAPSULE | Refills: 0 | Status: ON HOLD | OUTPATIENT
Start: 2023-03-09 | End: 2023-03-15

## 2023-03-13 NOTE — PRE-PROCEDURE INSTRUCTIONS
Patient instructed to have no food past midnight, clears up to 2 hours prior to arrival time. Patient instructed to wear no lotions, jewelry or piercing's day of surgery.  Patient to shower with surgical soap am of surgery.  Patient to take Flonase, gabapentin, metoprolol, and if needed Imitrex and flexeril, hyoscyamine.

## 2023-03-13 NOTE — PROGRESS NOTES
"Chief Complaint  Post-op Follow-up (1 week post op f/u rt breast capsulectomy with mesh)    Subjective          History of Present Illness  Seda Mobley is a 38 y.o. female who presents to [facility] for Postoperative Follow-Up of right breast capsulectomy on 3/15/2023.    Pt presents today for 1 week post op.  Doing ok.  No concerns.    Drain 30/20/20.  The drainage is been less than 30 cc a day for 2 days now.    Allergies: Bee venom, Latex, Metronidazole, Omeprazole, and Paroxetine  Allergies Reconciled.    Review of Systems   All review of system has been reviewed and it  is negative except the ones note above.     Objective     /73 (BP Location: Left arm, Patient Position: Sitting)   Temp 94.6 °F (34.8 °C) (Temporal)   Ht 172.7 cm (67.99\")   Wt 64.2 kg (141 lb 9.6 oz)   SpO2 99%   BMI 21.54 kg/m²     Body mass index is 21.54 kg/m².    Physical Exam  General Inspection: Incision healing well, no swelling, no erythema, no drainage.  Patient with good symmetry with regards to the inframammary fold.  There is more upper pole fullness on the right side than the left, but this is consistent with normal postoperative swelling.  No erythema or induration.  No fluctuance.    Result Review :                Assessment and Plan      Diagnoses and all orders for this visit:    1. Postoperative follow-up (Primary)        Plan:  • Drain was removed today in clinic.  • Discussed swelling with pt.  • Pathology report reviewed with patient.  Patient provided with copy of the report.  Patient may also access the report at any time through [employer].  • Patient understands that as the swelling decreases, the result will become more symmetric.  • Continue current care  • No worrisome changes      Scribed by Zuleima Carrillo, acting as a scribe for Zia Fragoso MD, 03/23/23 11:00 EDT.  Zia Fragoso MD's signature on the note affirms that the note " adequately documents the care provided.        Follow Up     RTC in 2 weeks    Patient was given instructions and counseling regarding her condition. Please see specific information pulled into the AVS if appropriate.     Zia Fragoso MD  03/23/2023

## 2023-03-15 ENCOUNTER — ANESTHESIA EVENT (OUTPATIENT)
Dept: PERIOP | Facility: HOSPITAL | Age: 38
End: 2023-03-15
Payer: OTHER GOVERNMENT

## 2023-03-15 ENCOUNTER — HOSPITAL ENCOUNTER (OUTPATIENT)
Facility: HOSPITAL | Age: 38
Setting detail: HOSPITAL OUTPATIENT SURGERY
Discharge: HOME | End: 2023-03-15
Attending: SURGERY | Admitting: SURGERY
Payer: OTHER GOVERNMENT

## 2023-03-15 ENCOUNTER — ANESTHESIA (OUTPATIENT)
Dept: PERIOP | Facility: HOSPITAL | Age: 38
End: 2023-03-15
Payer: OTHER GOVERNMENT

## 2023-03-15 VITALS
RESPIRATION RATE: 14 BRPM | TEMPERATURE: 97 F | OXYGEN SATURATION: 97 % | HEART RATE: 76 BPM | DIASTOLIC BLOOD PRESSURE: 68 MMHG | HEIGHT: 68 IN | BODY MASS INDEX: 21.68 KG/M2 | WEIGHT: 143.08 LBS | SYSTOLIC BLOOD PRESSURE: 115 MMHG

## 2023-03-15 DIAGNOSIS — Z01.818 PRE-OPERATIVE EXAMINATION: ICD-10-CM

## 2023-03-15 DIAGNOSIS — T85.42XD MALPOSITION OF BREAST IMPLANT, SUBSEQUENT ENCOUNTER: ICD-10-CM

## 2023-03-15 LAB
B-HCG UR QL: NEGATIVE
COTININE UR-MCNC: NEGATIVE NG/ML

## 2023-03-15 PROCEDURE — 25010000002 MIDAZOLAM PER 1MG: Performed by: ANESTHESIOLOGY

## 2023-03-15 PROCEDURE — 81025 URINE PREGNANCY TEST: CPT | Performed by: ANESTHESIOLOGY

## 2023-03-15 PROCEDURE — 25010000002 HYDROMORPHONE 1 MG/ML SOLUTION: Performed by: NURSE ANESTHETIST, CERTIFIED REGISTERED

## 2023-03-15 PROCEDURE — 15777 ACELLULAR DERM MATRIX IMPLT: CPT | Performed by: SURGERY

## 2023-03-15 PROCEDURE — 25010000002 ONDANSETRON PER 1 MG: Performed by: NURSE ANESTHETIST, CERTIFIED REGISTERED

## 2023-03-15 PROCEDURE — 25010000002 CEFAZOLIN IN DEXTROSE 2-4 GM/100ML-% SOLUTION: Performed by: SURGERY

## 2023-03-15 PROCEDURE — 88302 TISSUE EXAM BY PATHOLOGIST: CPT | Performed by: SURGERY

## 2023-03-15 PROCEDURE — 25010000002 PROPOFOL 10 MG/ML EMULSION: Performed by: NURSE ANESTHETIST, CERTIFIED REGISTERED

## 2023-03-15 PROCEDURE — G0480 DRUG TEST DEF 1-7 CLASSES: HCPCS | Performed by: SURGERY

## 2023-03-15 PROCEDURE — 19370 REVJ PERI-IMPLT CAPSULE BRST: CPT | Performed by: SURGERY

## 2023-03-15 PROCEDURE — 25010000002 DEXAMETHASONE PER 1 MG: Performed by: NURSE ANESTHETIST, CERTIFIED REGISTERED

## 2023-03-15 PROCEDURE — 25010000002 FENTANYL CITRATE (PF) 50 MCG/ML SOLUTION: Performed by: NURSE ANESTHETIST, CERTIFIED REGISTERED

## 2023-03-15 DEVICE — GRFT TISS ALLODERMSELECT RTM CONTR PERF MD/THK 200CM/SQ XL: Type: IMPLANTABLE DEVICE | Site: BREAST | Status: FUNCTIONAL

## 2023-03-15 RX ORDER — ONDANSETRON 2 MG/ML
4 INJECTION INTRAMUSCULAR; INTRAVENOUS ONCE AS NEEDED
Status: DISCONTINUED | OUTPATIENT
Start: 2023-03-15 | End: 2023-03-15 | Stop reason: HOSPADM

## 2023-03-15 RX ORDER — DEXMEDETOMIDINE HYDROCHLORIDE 100 UG/ML
INJECTION, SOLUTION INTRAVENOUS AS NEEDED
Status: DISCONTINUED | OUTPATIENT
Start: 2023-03-15 | End: 2023-03-15 | Stop reason: SURG

## 2023-03-15 RX ORDER — ROCURONIUM BROMIDE 10 MG/ML
INJECTION, SOLUTION INTRAVENOUS AS NEEDED
Status: DISCONTINUED | OUTPATIENT
Start: 2023-03-15 | End: 2023-03-15 | Stop reason: SURG

## 2023-03-15 RX ORDER — CEFAZOLIN SODIUM 2 G/100ML
2 INJECTION, SOLUTION INTRAVENOUS ONCE
Status: COMPLETED | OUTPATIENT
Start: 2023-03-15 | End: 2023-03-15

## 2023-03-15 RX ORDER — LIDOCAINE HYDROCHLORIDE 20 MG/ML
INJECTION, SOLUTION EPIDURAL; INFILTRATION; INTRACAUDAL; PERINEURAL AS NEEDED
Status: DISCONTINUED | OUTPATIENT
Start: 2023-03-15 | End: 2023-03-15 | Stop reason: SURG

## 2023-03-15 RX ORDER — ONDANSETRON 2 MG/ML
INJECTION INTRAMUSCULAR; INTRAVENOUS AS NEEDED
Status: DISCONTINUED | OUTPATIENT
Start: 2023-03-15 | End: 2023-03-15 | Stop reason: SURG

## 2023-03-15 RX ORDER — PROPOFOL 10 MG/ML
VIAL (ML) INTRAVENOUS AS NEEDED
Status: DISCONTINUED | OUTPATIENT
Start: 2023-03-15 | End: 2023-03-15 | Stop reason: SURG

## 2023-03-15 RX ORDER — MEPERIDINE HYDROCHLORIDE 25 MG/ML
12.5 INJECTION INTRAMUSCULAR; INTRAVENOUS; SUBCUTANEOUS
Status: DISCONTINUED | OUTPATIENT
Start: 2023-03-15 | End: 2023-03-15 | Stop reason: HOSPADM

## 2023-03-15 RX ORDER — ACETAMINOPHEN 500 MG
1000 TABLET ORAL ONCE
Status: COMPLETED | OUTPATIENT
Start: 2023-03-15 | End: 2023-03-15

## 2023-03-15 RX ORDER — DEXAMETHASONE SODIUM PHOSPHATE 4 MG/ML
INJECTION, SOLUTION INTRA-ARTICULAR; INTRALESIONAL; INTRAMUSCULAR; INTRAVENOUS; SOFT TISSUE AS NEEDED
Status: DISCONTINUED | OUTPATIENT
Start: 2023-03-15 | End: 2023-03-15 | Stop reason: SURG

## 2023-03-15 RX ORDER — PROMETHAZINE HYDROCHLORIDE 25 MG/1
25 SUPPOSITORY RECTAL ONCE AS NEEDED
Status: DISCONTINUED | OUTPATIENT
Start: 2023-03-15 | End: 2023-03-15 | Stop reason: HOSPADM

## 2023-03-15 RX ORDER — SCOLOPAMINE TRANSDERMAL SYSTEM 1 MG/1
1 PATCH, EXTENDED RELEASE TRANSDERMAL ONCE
Status: DISCONTINUED | OUTPATIENT
Start: 2023-03-15 | End: 2023-03-15 | Stop reason: HOSPADM

## 2023-03-15 RX ORDER — PROMETHAZINE HYDROCHLORIDE 12.5 MG/1
25 TABLET ORAL ONCE AS NEEDED
Status: DISCONTINUED | OUTPATIENT
Start: 2023-03-15 | End: 2023-03-15 | Stop reason: HOSPADM

## 2023-03-15 RX ORDER — OXYCODONE HYDROCHLORIDE 5 MG/1
5 TABLET ORAL
Status: COMPLETED | OUTPATIENT
Start: 2023-03-15 | End: 2023-03-15

## 2023-03-15 RX ORDER — SODIUM CHLORIDE, SODIUM LACTATE, POTASSIUM CHLORIDE, CALCIUM CHLORIDE 600; 310; 30; 20 MG/100ML; MG/100ML; MG/100ML; MG/100ML
9 INJECTION, SOLUTION INTRAVENOUS CONTINUOUS PRN
Status: DISCONTINUED | OUTPATIENT
Start: 2023-03-15 | End: 2023-03-15 | Stop reason: HOSPADM

## 2023-03-15 RX ORDER — MIDAZOLAM HYDROCHLORIDE 2 MG/2ML
2 INJECTION, SOLUTION INTRAMUSCULAR; INTRAVENOUS ONCE
Status: COMPLETED | OUTPATIENT
Start: 2023-03-15 | End: 2023-03-15

## 2023-03-15 RX ORDER — FENTANYL CITRATE 50 UG/ML
INJECTION, SOLUTION INTRAMUSCULAR; INTRAVENOUS AS NEEDED
Status: DISCONTINUED | OUTPATIENT
Start: 2023-03-15 | End: 2023-03-15 | Stop reason: SURG

## 2023-03-15 RX ORDER — PHENYLEPHRINE HCL IN 0.9% NACL 1 MG/10 ML
SYRINGE (ML) INTRAVENOUS AS NEEDED
Status: DISCONTINUED | OUTPATIENT
Start: 2023-03-15 | End: 2023-03-15 | Stop reason: SURG

## 2023-03-15 RX ORDER — MAGNESIUM HYDROXIDE 1200 MG/15ML
LIQUID ORAL AS NEEDED
Status: DISCONTINUED | OUTPATIENT
Start: 2023-03-15 | End: 2023-03-15 | Stop reason: HOSPADM

## 2023-03-15 RX ORDER — CEPHALEXIN 500 MG/1
500 CAPSULE ORAL 4 TIMES DAILY
Qty: 20 CAPSULE | Refills: 0 | Status: SHIPPED | OUTPATIENT
Start: 2023-03-15 | End: 2023-03-20

## 2023-03-15 RX ORDER — CEPHALEXIN 500 MG/1
500 CAPSULE ORAL 4 TIMES DAILY
Qty: 20 CAPSULE | Refills: 0 | Status: CANCELLED | OUTPATIENT
Start: 2023-03-15 | End: 2023-03-20

## 2023-03-15 RX ADMIN — SCOPALAMINE 1 PATCH: 1 PATCH, EXTENDED RELEASE TRANSDERMAL at 08:24

## 2023-03-15 RX ADMIN — FENTANYL CITRATE 50 MCG: 50 INJECTION, SOLUTION INTRAMUSCULAR; INTRAVENOUS at 09:27

## 2023-03-15 RX ADMIN — OXYCODONE HYDROCHLORIDE 5 MG: 5 TABLET ORAL at 12:27

## 2023-03-15 RX ADMIN — PROPOFOL 130 MG: 10 INJECTION, EMULSION INTRAVENOUS at 09:08

## 2023-03-15 RX ADMIN — ONDANSETRON 4 MG: 2 INJECTION INTRAMUSCULAR; INTRAVENOUS at 10:33

## 2023-03-15 RX ADMIN — SUGAMMADEX 200 MG: 100 INJECTION, SOLUTION INTRAVENOUS at 10:33

## 2023-03-15 RX ADMIN — MIDAZOLAM HYDROCHLORIDE 2 MG: 1 INJECTION, SOLUTION INTRAMUSCULAR; INTRAVENOUS at 08:51

## 2023-03-15 RX ADMIN — HYDROMORPHONE HYDROCHLORIDE 0.5 MG: 1 INJECTION, SOLUTION INTRAMUSCULAR; INTRAVENOUS; SUBCUTANEOUS at 11:51

## 2023-03-15 RX ADMIN — ACETAMINOPHEN 1000 MG: 500 TABLET ORAL at 08:24

## 2023-03-15 RX ADMIN — FENTANYL CITRATE 50 MCG: 50 INJECTION, SOLUTION INTRAMUSCULAR; INTRAVENOUS at 09:37

## 2023-03-15 RX ADMIN — ROCURONIUM BROMIDE 50 MG: 10 INJECTION, SOLUTION INTRAVENOUS at 09:08

## 2023-03-15 RX ADMIN — HYDROMORPHONE HYDROCHLORIDE 0.5 MG: 1 INJECTION, SOLUTION INTRAMUSCULAR; INTRAVENOUS; SUBCUTANEOUS at 11:34

## 2023-03-15 RX ADMIN — SODIUM CHLORIDE, POTASSIUM CHLORIDE, SODIUM LACTATE AND CALCIUM CHLORIDE: 600; 310; 30; 20 INJECTION, SOLUTION INTRAVENOUS at 10:11

## 2023-03-15 RX ADMIN — LIDOCAINE HYDROCHLORIDE 100 MG: 20 INJECTION, SOLUTION EPIDURAL; INFILTRATION; INTRACAUDAL; PERINEURAL at 09:08

## 2023-03-15 RX ADMIN — CEFAZOLIN SODIUM 2 G: 2 INJECTION, SOLUTION INTRAVENOUS at 09:06

## 2023-03-15 RX ADMIN — OXYCODONE HYDROCHLORIDE 5 MG: 5 TABLET ORAL at 12:07

## 2023-03-15 RX ADMIN — DEXAMETHASONE SODIUM PHOSPHATE 8 MG: 4 INJECTION, SOLUTION INTRA-ARTICULAR; INTRALESIONAL; INTRAMUSCULAR; INTRAVENOUS; SOFT TISSUE at 09:17

## 2023-03-15 RX ADMIN — FENTANYL CITRATE 50 MCG: 50 INJECTION, SOLUTION INTRAMUSCULAR; INTRAVENOUS at 09:08

## 2023-03-15 RX ADMIN — DEXMEDETOMIDINE HYDROCHLORIDE 35 MCG: 100 INJECTION, SOLUTION, CONCENTRATE INTRAVENOUS at 10:24

## 2023-03-15 RX ADMIN — SODIUM CHLORIDE, POTASSIUM CHLORIDE, SODIUM LACTATE AND CALCIUM CHLORIDE 9 ML/HR: 600; 310; 30; 20 INJECTION, SOLUTION INTRAVENOUS at 08:25

## 2023-03-15 RX ADMIN — Medication 100 MCG: at 10:17

## 2023-03-15 NOTE — ANESTHESIA PREPROCEDURE EVALUATION
Anesthesia Evaluation     Patient summary reviewed and Nursing notes reviewed                Airway   Mallampati: I  TM distance: >3 FB  Neck ROM: full  No difficulty expected  Dental      Pulmonary - negative pulmonary ROS and normal exam    breath sounds clear to auscultation  Cardiovascular - normal exam    Rhythm: regular  Rate: normal    (+) valvular problems/murmurs MR,       Neuro/Psych  (+) headaches, psychiatric history Depression,    GI/Hepatic/Renal/Endo    (+)  GERD,  renal disease, thyroid problem hyperthyroidism    Musculoskeletal     Abdominal    Substance History - negative use     OB/GYN negative ob/gyn ROS         Other   arthritis,                      Anesthesia Plan    ASA 2     general     intravenous induction     Anesthetic plan, risks, benefits, and alternatives have been provided, discussed and informed consent has been obtained with: patient.        CODE STATUS:

## 2023-03-15 NOTE — DISCHARGE INSTRUCTIONS
DISCHARGE INSTRUCTIONS  SURGICAL / AMBULATORY  PROCEDURES      For your surgery you had:  General anesthesia (you may have a sore throat for the first 24 hours)  You received a medicated patch for nausea prevention today (behind your ear). It is recommended that you remove it 24-48 hours post-operatively. It must be removed within 72 hours.   You have received an anesthesia medication today that can cause hormonal forms of birth control to be ineffective. You should use a different form of birth control (to prevent pregnancy) for 7 days.   You may experience dizziness, drowsiness, or light-headedness for several hours following surgery/procedure.  Do not stay alone today or tonight.  Limit your activity for 24 hours.  Resume your diet slowly.  Follow whatever special dietary instructions you may have been given by your doctor.  You should not drive or operate machinery, drink alcohol, or sign legally binding documents for 24 hours or while you are taking pain medication.  Last dose of pain medication was given at:   .  NOTIFY YOUR DOCTOR IF YOU EXPERIENCE ANY OF THE FOLLOWING:  Temperature greater than 101 degrees Fahrenheit  Shaking Chills  Redness or excessive drainage from incision  Nausea, vomiting and/or pain that is not controlled by prescribed medications  Increase in bleeding or bleeding that is excessive  Unable to urinate in 6 hours after surgery  If unable to reach your doctor, please go to the closest Emergency Room  You may begin dressing changes:     [] in 24 hours   [x] in 2 DAYS   [] Other:    You may shower IN 2 DAYS  .  Medications per physician instructions as indicated on Discharge Medication Information Sheet.      SPECIAL INSTRUCTIONS:     FOLLOW DR MEDINA'S INSTRUCTIONS

## 2023-03-15 NOTE — ANESTHESIA POSTPROCEDURE EVALUATION
Patient: Seda Mobley    Procedure Summary     Date: 03/15/23 Room / Location: [facility] OR  / [facility]    Anesthesia Start: 0905 Anesthesia Stop: 1049    Procedure: BREAST CAPSULOTOMY CAPSULECTOMY WITH MESH (Right: Breast) Diagnosis:       Malposition of breast implant, subsequent encounter      (Malposition of breast implant, subsequent encounter [T85.42XD])    Surgeons: Zia Fragoso MD Provider: Joaquim Dunham MD    Anesthesia Type: general ASA Status: 2          Anesthesia Type: general    Vitals  Vitals Value Taken Time   /64 03/15/23 1137   Temp 36.1 °C (97 °F) 03/15/23 1047   Pulse 72 03/15/23 1147   Resp 9 03/15/23 1100   SpO2 95 % 03/15/23 1147   Vitals shown include unvalidated device data.        Post Anesthesia Care and Evaluation    Patient location during evaluation: bedside  Patient participation: complete - patient participated  Level of consciousness: awake  Pain management: adequate    Airway patency: patent  PONV Status: none  Cardiovascular status: acceptable  Respiratory status: acceptable  Hydration status: acceptable    Comments: An Anesthesiologist personally participated in the most demanding procedures (including induction and emergence if applicable) in the anesthesia plan, monitored the course of anesthesia administration at frequent intervals and remained physically present and available for immediate diagnosis and treatment of emergencies.
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
## 2023-03-15 NOTE — INTERVAL H&P NOTE
H&P reviewed. The patient was examined and there are no changes to the H&P.      The risks and benefits of the procedure were discussed with the patient.  The risk described included, but not limited to, bleeding, infection, need for revision surgeries, seroma, hematoma, poor cosmetic result, poor functional result, edge necrosis, edge separation, deep venous thrombosis, pulmonary embolus, pneumonia, heart attack, stroke, death.  Her questions were answered.  She still agrees to the procedure.    Patient marked in the standing position.   present throughout  Nurse present throughout

## 2023-03-23 ENCOUNTER — OFFICE VISIT (OUTPATIENT)
Dept: PLASTIC SURGERY | Facility: CLINIC | Age: 38
End: 2023-03-23
Payer: OTHER GOVERNMENT

## 2023-03-23 VITALS
DIASTOLIC BLOOD PRESSURE: 73 MMHG | SYSTOLIC BLOOD PRESSURE: 112 MMHG | HEIGHT: 68 IN | WEIGHT: 141.6 LBS | TEMPERATURE: 94.6 F | OXYGEN SATURATION: 99 % | BODY MASS INDEX: 21.46 KG/M2

## 2023-03-23 DIAGNOSIS — Z09 POSTOPERATIVE FOLLOW-UP: Primary | ICD-10-CM

## 2023-03-23 PROCEDURE — 99024 POSTOP FOLLOW-UP VISIT: CPT | Performed by: SURGERY

## 2023-03-27 ENCOUNTER — OFFICE VISIT (OUTPATIENT)
Dept: PSYCHIATRY | Facility: CLINIC | Age: 38
End: 2023-03-27
Payer: COMMERCIAL

## 2023-03-27 VITALS
HEART RATE: 102 BPM | SYSTOLIC BLOOD PRESSURE: 114 MMHG | WEIGHT: 144.8 LBS | BODY MASS INDEX: 21.95 KG/M2 | DIASTOLIC BLOOD PRESSURE: 70 MMHG | HEIGHT: 68 IN

## 2023-03-27 DIAGNOSIS — F51.05 INSOMNIA DUE TO MENTAL CONDITION: ICD-10-CM

## 2023-03-27 DIAGNOSIS — F33.1 MAJOR DEPRESSIVE DISORDER, RECURRENT EPISODE, MODERATE: ICD-10-CM

## 2023-03-27 DIAGNOSIS — F41.1 GENERALIZED ANXIETY DISORDER: Primary | ICD-10-CM

## 2023-03-27 DIAGNOSIS — F90.0 ADHD (ATTENTION DEFICIT HYPERACTIVITY DISORDER), INATTENTIVE TYPE: ICD-10-CM

## 2023-03-27 DIAGNOSIS — F43.10 POST TRAUMATIC STRESS DISORDER (PTSD): ICD-10-CM

## 2023-03-27 DIAGNOSIS — S06.9X9D TRAUMATIC BRAIN INJURY WITH LOSS OF CONSCIOUSNESS, SUBSEQUENT ENCOUNTER: ICD-10-CM

## 2023-03-27 DIAGNOSIS — F41.0 PANIC ATTACKS: ICD-10-CM

## 2023-03-27 PROCEDURE — 90833 PSYTX W PT W E/M 30 MIN: CPT | Performed by: STUDENT IN AN ORGANIZED HEALTH CARE EDUCATION/TRAINING PROGRAM

## 2023-03-27 PROCEDURE — 99214 OFFICE O/P EST MOD 30 MIN: CPT | Performed by: STUDENT IN AN ORGANIZED HEALTH CARE EDUCATION/TRAINING PROGRAM

## 2023-03-27 RX ORDER — ATOMOXETINE 25 MG/1
25 CAPSULE ORAL DAILY
Qty: 30 CAPSULE | Refills: 2 | Status: SHIPPED | OUTPATIENT
Start: 2023-03-27

## 2023-03-27 NOTE — PROGRESS NOTES
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 build appropriate rapport and a positive therapeutic relationship with open and honest communication. Patient given education on medication side effects, diagnosis/illness and relapse symptoms. Plan to continue supportive psychotherapy in next appointment to provide symptom relief.  Diagnoses: as above  Symptoms: as above  Functional status: good  Mental Status Exam: as above    Treatment plan: Medication management and supportive psychotherapy  Prognosis: good  Progress: improving, not at goal  6 wks      2/10: Stopped qelbree (HA). Start guanfacine for anx, adhd. Add cymbalta next visit (pain, dep, anx). Situational stressors: ex-, her mom is sick. 17   Progress: stable, not at goal  6 wks      11/8: Increase qelbree to target adhd, dep, anx. 17   Prognosis: good  Progress: residual adhd, dep, anx  6 wks      9/27: Start qelbree for ADHD. Residual anxiety. 18 minutes of supportive psychotherapy   Treatment plan: Medication management and supportive psychotherapy  Prognosis: good  Progress: stable, not at goal re: anxiety  6 wks      8/16: Improving. Increase seroquel. Await UDS. Low threshold  to start adderall for ADHD (if negative then start and have her sign CSA in 6 wks). 17 minutes of supportive psychotherapy  Treatment plan: Medication management and supportive psychotherapy  Prognosis: good  Progress: improving  6 wks      7/15: Encouraged continued cessation of cannabis. Start seroquel. Suspicion of bipolar. Hyperverbal, not sleeping well. 17 minutes of supportive psychotherapy Treatment plan: Medication management and supportive psychotherapy  Prognosis: good  Progress: better, but not at goal  4 wks      6/16: Increase depakote for irritability, anxiety, insomnia, HA's, poor appetite. 17 minutes of supportive psychotherapyTreatment plan: Medication management and supportive psychotherapy  Prognosis: good  Progress: better  2 mos      4/28: Already on propranolol.  Tolerating the Depakote which is helping.  Start Wellbutrin to target depression, anxiety, ADHD.  Patient smokes cannabis so we cannot start a stimulant. 10 minutes of supportive psychotherapy   Treatment plan: Medication management and supportive psychotherapy  Prognosis: Good  Progress: Some, significant  7 weeks    3/3: Start therapy.  TBI, ADHD, PTSD, Panic disorder. R/O bipolar. Has never been on a stimulant. Start depakote at night. Consider klonipin, prazosin, stimulant. 15 minutes of supportive psychotherapy  6 wks      PLAN:  78. Safety: No acute safety concerns  79. Therapy: Referral Made  80. Risk Assessment: Risk of self-harm acutely is moderate.  Risk factors include anxiety disorder, mood disorder, PTSD, AODA, access to weapons, and recent psychosocial stressors (pandemic). Protective factors include no family history, no present SI, no history of suicide attempts or self-harm in the past, healthcare seeking, future orientation, willingness to engage in care.  Risk of self-harm chronically is also moderate, but could be further elevated in the event of treatment noncompliance and/or AODA.  81. Meds:  a. START strattera 25  mg daily. Risks, benefits, alternatives discussed with patient including nausea, GI upset, sedation, exacerbation of irritability, dry mouth, constipation, urinary hesitancy. After discussion of these risks and benefits, the patient voiced understanding and agreed to proceed.  b. STOPPED propranolol 20 tid. Switched to metoprolol by cardiology.  c. STOPPED qelbree 200 to 400 mg daily. HA. 2/23.  d. STOPPED guanfacine ER 1 mg nightly. Dizziness. 3/23. We might come back to it.  e. SHE REDUCED Depakote 500 to 250 mg p.o. nightly. Risks, benefits, alternatives discussed with patient including nausea and vomiting, GI upset, sedation, dizziness/falls risk, increased appetite. After discussion of these risks and benefits, the patient voiced understanding and agreed to proceed. Patient also informed of the need to take as prescribed, and for periodic labwork.  f. S/P:  i. seroquel 100 mg nightly. Crazy dreams.  ii. Wellbutrin  mg daily.   82. Labs: UDS pos for thc 1/2023    Patient screened positive for depression based on a PHQ-9 score of  on . Follow-up recommendations include: Suicide Risk Assessment performed.           TREATMENT PLAN/GOALS: Continue supportive psychotherapy efforts and medications as indicated. Treatment and medication options discussed during today's visit. Patient acknowledged and verbally consented to continue with current treatment plan and was educated on the importance of compliance with treatment and follow-up appointments.    MEDICATION ISSUES:  MAURA reviewed as expected.  Discussed medication options and treatment plan of prescribed medication as well as the risks, benefits, and side effects including potential falls, possible impaired driving and metabolic adversities among others. Patient is agreeable to call the office with any worsening of symptoms or onset of side effects. Patient is agreeable to call 911 or go to the nearest ER should he/she begin having SI/HI. No medication  side effects or related complaints today.     MEDS ORDERED DURING VISIT:  New Medications Ordered This Visit   Medications   • atomoxetine (Strattera) 25 MG capsule     Sig: Take 1 capsule by mouth Daily.     Dispense:  30 capsule     Refill:  2       Return in about 6 weeks (around 5/8/2023).         This document has been electronically signed by Sarita Schultz MD  March 27, 2023 16:21 EDT      Part of this note may be an electronic transcription/translation of spoken language to printed text using the Dragon Dictation System.

## 2023-03-27 NOTE — PATIENT INSTRUCTIONS
1.  Please return to clinic at your next scheduled visit.  Contact the clinic ([phone]) at least 24 hours prior in the event you need to cancel.  2.  Do no harm to yourself or others.    3.  Avoid alcohol and drugs.    4.  Take all medications as prescribed.  Please contact the clinic with any concerns. If you are in need of medication refills, please call the clinic at [phone].    5. Should you want to get in touch with your provider, Dr. Sarita Schultz, please utilize [employer] or contact the office ([phone]), and staff will be able to page Dr. Schultz directly.  6.  In the event you have personal crisis, contact the following crisis numbers: Suicide Prevention Hotline 1-528.406.9024; [facility] Helpline 7-499-848-VSWC; [facility] Emergency Room [phone]; text HELLO to 098053; or 237.     SPECIFIC RECOMMENDATIONS:     1.      Medications discussed at this encounter:                   - start strattera     2.      Psychotherapy recommendations:

## 2023-03-28 ENCOUNTER — OFFICE VISIT (OUTPATIENT)
Dept: PLASTIC SURGERY | Facility: CLINIC | Age: 38
End: 2023-03-28
Payer: COMMERCIAL

## 2023-03-28 VITALS
HEART RATE: 91 BPM | BODY MASS INDEX: 21.67 KG/M2 | SYSTOLIC BLOOD PRESSURE: 106 MMHG | DIASTOLIC BLOOD PRESSURE: 71 MMHG | OXYGEN SATURATION: 98 % | HEIGHT: 68 IN | WEIGHT: 143 LBS | TEMPERATURE: 98.2 F

## 2023-03-28 DIAGNOSIS — Z09 POSTOPERATIVE FOLLOW-UP: ICD-10-CM

## 2023-03-28 DIAGNOSIS — T78.40XA ALLERGIC REACTION, INITIAL ENCOUNTER: Primary | ICD-10-CM

## 2023-03-28 PROCEDURE — 99024 POSTOP FOLLOW-UP VISIT: CPT | Performed by: SURGERY

## 2023-03-28 RX ORDER — METHYLPREDNISOLONE 4 MG/1
TABLET ORAL
Qty: 21 TABLET | Refills: 0 | Status: SHIPPED | OUTPATIENT
Start: 2023-03-28

## 2023-03-28 NOTE — PROGRESS NOTES
"Chief Complaint  Post-op Follow-up (3 week post op)    Subjective          History of Present Illness  Seda Mobley is a 38 y.o. female who presents to [facility] for Postoperative Follow-Up of right breast capsulectomy on 3/15/2023.    Pt presents today for 3 week post op.      Pt states she is have extreme itching and has noticed some drainage on rt breast.      Allergies: Bee venom, Latex, Metronidazole, Omeprazole, and Paroxetine  Allergies Reconciled.    Review of Systems   All review of system has been reviewed and it  is negative except the ones note above.     Objective     /71 (BP Location: Right arm, Patient Position: Sitting, Cuff Size: Adult)   Pulse 91   Temp 98.2 °F (36.8 °C) (Temporal)   Ht 172.7 cm (67.99\")   Wt 64.9 kg (143 lb)   SpO2 98%   BMI 21.75 kg/m²     Body mass index is 21.75 kg/m².    Physical Exam  General Inspection: Incision healing well, no swelling, no drainage.  Patient with good symmetry with regards to the inframammary fold.  There is improved symmetry with regards to the upper pole fullness on the right side than the left. No fluctuance.  The patient has a neatly geometric rectangular pattern around the inframammary fold incision of hyperemia and multiple small blisters.  Consistent with allergic reaction.  No cellulitis.     Result Review :                Assessment and Plan      Diagnoses and all orders for this visit:    1. Allergic reaction, initial encounter (Primary)  -     methylPREDNISolone (MEDROL) 4 MG dose pack; Take as directed on package instructions.  Dispense: 21 tablet; Refill: 0    2. Postoperative follow-up        Plan:  Contact dermatitis    No changes worrisome for infection or fluid collection.  No dehiscence of the edges.  The drainage appears to be coming not from the incision but rather from the numerous small blisters from the presumed allergic reaction.  Patient states she has a latex " allergy and this is happened previously and that the symptoms this time are similar to prior allergic reactions.    Advised pt to apply antifungal ointment to area to help sooth itching.  Sent in Videofropper to pharmacy          Follow Up   RTC 4/20/23    Patient was given instructions and counseling regarding her condition. Please see specific information pulled into the AVS if appropriate.     Zia Fragoso MD  03/28/2023

## 2023-03-30 ENCOUNTER — OFFICE VISIT (OUTPATIENT)
Dept: FAMILY MEDICINE CLINIC | Facility: CLINIC | Age: 38
End: 2023-03-30
Payer: COMMERCIAL

## 2023-03-30 VITALS
SYSTOLIC BLOOD PRESSURE: 102 MMHG | BODY MASS INDEX: 21.89 KG/M2 | OXYGEN SATURATION: 99 % | HEART RATE: 56 BPM | HEIGHT: 68 IN | TEMPERATURE: 97.7 F | DIASTOLIC BLOOD PRESSURE: 68 MMHG | WEIGHT: 144.4 LBS

## 2023-03-30 DIAGNOSIS — M50.30 DDD (DEGENERATIVE DISC DISEASE), CERVICAL: ICD-10-CM

## 2023-03-30 DIAGNOSIS — L98.9 SKIN LESION OF LEFT LEG: ICD-10-CM

## 2023-03-30 DIAGNOSIS — G62.9 NEUROPATHY: Primary | ICD-10-CM

## 2023-03-30 NOTE — PROGRESS NOTES
"Chief Complaint  Follow-up, neuropathy, skin lesion    Subjective         Seda Mobley presents to [facility]  HPI   Presents today for 1 month follow-up on neuropathy and fatigue.  Since stopping Intuniv her fatigue and dizziness had improved.  She was switched to Strattera.  Neuropathy is fairly controlled.  She reports physical therapy has helped with her back and hip pain she still has mild discomfort on her bilateral hip pain.  She does report having 2 skin lesions on her right left lower extremities.  States they have been there for years.  Denies itching but will scratch them at times and flex the skin off which they will bleed.    Social History     Socioeconomic History   • Marital status:    Tobacco Use   • Smoking status: Former     Packs/day: 1.00     Years: 22.00     Pack years: 22.00     Types: Cigarettes     Quit date: 2023     Years since quittin.2   • Smokeless tobacco: Never   Vaping Use   • Vaping Use: Some days   • Substances: CBD, Flavoring   • Devices: Pre-filled pod   Substance and Sexual Activity   • Alcohol use: Yes     Comment: Socially, occasionally   • Drug use: Never   • Sexual activity: Yes     Partners: Male     Birth control/protection: Rhythm        Objective     Vitals:    23 1309   BP: 102/68   Pulse: 56   Temp: 97.7 °F (36.5 °C)   SpO2: 99%   Weight: 65.5 kg (144 lb 6.4 oz)   Height: 172.7 cm (68\")        Body mass index is 21.96 kg/m².    Wt Readings from Last 3 Encounters:   23 65.5 kg (144 lb 6.4 oz)   23 64.9 kg (143 lb)   23 65.7 kg (144 lb 12.8 oz)       BP Readings from Last 3 Encounters:   23 102/68   23 106/71   23 114/70         Physical Exam  Vitals reviewed.   Constitutional:       Appearance: Normal appearance. She is well-developed.   HENT:      Head: Normocephalic and atraumatic.      Right Ear: External ear normal.      Left Ear: External ear normal.      Mouth/Throat:     "  Pharynx: No oropharyngeal exudate.   Eyes:      Conjunctiva/sclera: Conjunctivae normal.      Pupils: Pupils are equal, round, and reactive to light.   Cardiovascular:      Rate and Rhythm: Normal rate and regular rhythm.      Heart sounds: No murmur heard.    No friction rub. No gallop.   Pulmonary:      Effort: Pulmonary effort is normal.      Breath sounds: Normal breath sounds. No wheezing or rhonchi.   Abdominal:      General: Bowel sounds are normal. There is no distension.      Palpations: Abdomen is soft.      Tenderness: There is no abdominal tenderness.   Skin:     General: Skin is warm and dry.      Findings: Lesion present.      Comments: Bilateral lower extremities.  Left side papule on the left shin, right side to lesions that are small erythema papules   Neurological:      Mental Status: She is alert and oriented to person, place, and time.   Psychiatric:         Mood and Affect: Mood and affect normal.         Behavior: Behavior normal.         Thought Content: Thought content normal.         Judgment: Judgment normal.          Result Review :   The following data was reviewed by: ALDAIR Bey on 03/30/2023:      Procedures    Assessment and Plan   Diagnoses and all orders for this visit:    1. Neuropathy (Primary)    2. Skin lesion of left leg  -     Ambulatory Referral to Dermatology    3. DDD (degenerative disc disease), cervical      Continue gabapentin 300 mg twice a day.  Will consult dermatology for further evaluation and treatment.  Continue with physical therapy for the low back and hip pain.    BMI is within normal parameters. No other follow-up for BMI required.        Follow Up   Return in about 3 months (around 6/30/2023), or if symptoms worsen or fail to improve, for Next scheduled follow up.  Patient was given instructions and counseling regarding her condition or for health maintenance advice. Please see specific information pulled into the AVS if appropriate.     Please note  that portions of this note were completed with a voice recognition program.
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
## 2023-04-20 ENCOUNTER — PREP FOR SURGERY (OUTPATIENT)
Dept: OTHER | Facility: HOSPITAL | Age: 38
End: 2023-04-20
Payer: COMMERCIAL

## 2023-04-20 ENCOUNTER — OFFICE VISIT (OUTPATIENT)
Dept: PLASTIC SURGERY | Facility: CLINIC | Age: 38
End: 2023-04-20
Payer: COMMERCIAL

## 2023-04-20 VITALS
HEIGHT: 68 IN | OXYGEN SATURATION: 98 % | HEART RATE: 88 BPM | TEMPERATURE: 98.2 F | DIASTOLIC BLOOD PRESSURE: 72 MMHG | WEIGHT: 146 LBS | SYSTOLIC BLOOD PRESSURE: 106 MMHG | BODY MASS INDEX: 22.13 KG/M2

## 2023-04-20 DIAGNOSIS — R52 PAINFUL CUTANEOUS SCAR: Primary | ICD-10-CM

## 2023-04-20 DIAGNOSIS — L90.5 PAINFUL CUTANEOUS SCAR: Primary | ICD-10-CM

## 2023-04-20 RX ORDER — CEFAZOLIN SODIUM 2 G/100ML
2 INJECTION, SOLUTION INTRAVENOUS ONCE
OUTPATIENT
Start: 2023-04-20 | End: 2023-04-20

## 2023-04-21 ENCOUNTER — LAB (OUTPATIENT)
Dept: LAB | Facility: HOSPITAL | Age: 38
End: 2023-04-21
Payer: OTHER GOVERNMENT

## 2023-04-21 DIAGNOSIS — R52 PAINFUL CUTANEOUS SCAR: ICD-10-CM

## 2023-04-21 DIAGNOSIS — L90.5 PAINFUL CUTANEOUS SCAR: ICD-10-CM

## 2023-04-21 LAB — COTININE UR-MCNC: NEGATIVE NG/ML

## 2023-04-21 PROCEDURE — G0480 DRUG TEST DEF 1-7 CLASSES: HCPCS

## 2023-04-27 DIAGNOSIS — M54.6 ACUTE BILATERAL THORACIC BACK PAIN: ICD-10-CM

## 2023-04-27 DIAGNOSIS — M54.42 CHRONIC BILATERAL LOW BACK PAIN WITH LEFT-SIDED SCIATICA: Primary | ICD-10-CM

## 2023-04-27 DIAGNOSIS — M50.30 DDD (DEGENERATIVE DISC DISEASE), CERVICAL: ICD-10-CM

## 2023-04-27 DIAGNOSIS — G89.29 CHRONIC BILATERAL LOW BACK PAIN WITH LEFT-SIDED SCIATICA: Primary | ICD-10-CM

## 2023-05-09 DIAGNOSIS — G62.9 NEUROPATHY: ICD-10-CM

## 2023-05-09 DIAGNOSIS — M54.42 CHRONIC BILATERAL LOW BACK PAIN WITH LEFT-SIDED SCIATICA: ICD-10-CM

## 2023-05-09 DIAGNOSIS — E55.9 VITAMIN D DEFICIENCY: ICD-10-CM

## 2023-05-09 DIAGNOSIS — M50.30 DDD (DEGENERATIVE DISC DISEASE), CERVICAL: ICD-10-CM

## 2023-05-09 DIAGNOSIS — G89.29 CHRONIC BILATERAL LOW BACK PAIN WITH LEFT-SIDED SCIATICA: ICD-10-CM

## 2023-05-09 RX ORDER — ERGOCALCIFEROL 1.25 MG/1
CAPSULE ORAL
Qty: 13 CAPSULE | Refills: 1 | Status: SHIPPED | OUTPATIENT
Start: 2023-05-09

## 2023-05-09 NOTE — PROGRESS NOTES
"Pre-op Exam (Scar revision abdomen scheduled 6/9/2023)            History of Present Illness  Seda Mobley is a 38 y.o. female who presents to [facility] for Pre-Operative Examination for Scar revision trunk 6/9/23.       Subjective        Bee venom, Latex, Metronidazole, Omeprazole, and Paroxetine  Allergies Reconciled.    Review of Systems   All review of system has been reviewed and it  is negative except the ones note above.     Objective     /77 (BP Location: Left arm, Patient Position: Sitting)   Pulse 100   Temp 98 °F (36.7 °C) (Temporal)   Ht 172.7 cm (67.99\")   Wt 68.4 kg (150 lb 12.8 oz)   SpO2 95%   BMI 22.93 kg/m²     Body mass index is 22.93 kg/m².    Physical Exam   Cardiovascular: Normal rate.     Pulmonary/Chest  Effort normal.   Abdomen  No interval change on abdominal examination.  No change in the scar, no rashes or open wounds.    Result Review :                Assessment and Plan      Diagnoses and all orders for this visit:    1. Pre-operative examination (Primary)  -     Nicotine Screen, Urine - Urine, Clean Catch; Future        Plan:    Abdominal scar revision    The risks and benefits of the procedure were discussed with the patient.  The risk described included, but not limited to, bleeding, infection, need for revision surgeries, seroma, hematoma, poor cosmetic result, poor functional result, edge necrosis, edge separation, deep venous thrombosis, pulmonary embolus, pneumonia, heart attack, stroke, death.  Her questions were answered. She still agrees to the procedure.  The patient understands she might have a drain after the procedure      • Given these options, the patient has verbally expressed an understanding of the risks of surgery and finds these risks acceptable. We will proceed with surgery as soon as possible.    • Medications sent to pharmacy      Scribed by Zuleima Carrillo, acting as a scribe for Zia Ramsey" MD Fouzia, 05/18/23 09:55 EDT.  Zia Fragoso MD's signature on the note affirms that the note adequately documents the care provided.          Follow Up     Return for RTC 1 week after surgery.    Patient was given instructions and counseling regarding her condition. Please see specific information pulled into the AVS if appropriate.     Zia Fragoso MD  05/18/2023

## 2023-05-09 NOTE — H&P (VIEW-ONLY)
"Pre-op Exam (Scar revision abdomen scheduled 6/9/2023)            History of Present Illness  Seda Mobley is a 38 y.o. female who presents to [facility] for Pre-Operative Examination for Scar revision trunk 6/9/23.       Subjective        Bee venom, Latex, Metronidazole, Omeprazole, and Paroxetine  Allergies Reconciled.    Review of Systems   All review of system has been reviewed and it  is negative except the ones note above.     Objective     /77 (BP Location: Left arm, Patient Position: Sitting)   Pulse 100   Temp 98 °F (36.7 °C) (Temporal)   Ht 172.7 cm (67.99\")   Wt 68.4 kg (150 lb 12.8 oz)   SpO2 95%   BMI 22.93 kg/m²     Body mass index is 22.93 kg/m².    Physical Exam   Cardiovascular: Normal rate.     Pulmonary/Chest  Effort normal.   Abdomen  No interval change on abdominal examination.  No change in the scar, no rashes or open wounds.    Result Review :                Assessment and Plan      Diagnoses and all orders for this visit:    1. Pre-operative examination (Primary)  -     Nicotine Screen, Urine - Urine, Clean Catch; Future        Plan:    Abdominal scar revision    The risks and benefits of the procedure were discussed with the patient.  The risk described included, but not limited to, bleeding, infection, need for revision surgeries, seroma, hematoma, poor cosmetic result, poor functional result, edge necrosis, edge separation, deep venous thrombosis, pulmonary embolus, pneumonia, heart attack, stroke, death.  Her questions were answered. She still agrees to the procedure.  The patient understands she might have a drain after the procedure      • Given these options, the patient has verbally expressed an understanding of the risks of surgery and finds these risks acceptable. We will proceed with surgery as soon as possible.    • Medications sent to pharmacy      Scribed by Zuleima Carrillo, acting as a scribe for Zia Ramsey" MD Fouzia, 05/18/23 09:55 EDT.  Zia Fragoso MD's signature on the note affirms that the note adequately documents the care provided.          Follow Up     Return for RTC 1 week after surgery.    Patient was given instructions and counseling regarding her condition. Please see specific information pulled into the AVS if appropriate.     Zia Fragoso MD  05/18/2023

## 2023-05-10 RX ORDER — GABAPENTIN 300 MG/1
CAPSULE ORAL
Qty: 60 CAPSULE | Refills: 2 | Status: SHIPPED | OUTPATIENT
Start: 2023-05-10

## 2023-05-10 RX ORDER — CYCLOBENZAPRINE HCL 10 MG
TABLET ORAL
Qty: 60 TABLET | Refills: 1 | Status: SHIPPED | OUTPATIENT
Start: 2023-05-10

## 2023-05-10 NOTE — TELEPHONE ENCOUNTER
Controlled Medication Refill Request:    1.  Last Office Visit:  3/30/2023    2.  Next Office Visit:  6/27/2023     3.  Last UDS Date:  1/10/2023    4.  Last Consent Signed:  1/10/2023    5.  Medication Requested: Gabapentin    Pharmacy:   [id number] - [place], [place] - [address] AT [facility] [address] [facility] & [place] - [phone]  - [phone] [facility] [address]  [facility] [address]  Phone: [phone] Fax: [phone]

## 2023-05-18 ENCOUNTER — OFFICE VISIT (OUTPATIENT)
Dept: PLASTIC SURGERY | Facility: CLINIC | Age: 38
End: 2023-05-18
Payer: COMMERCIAL

## 2023-05-18 ENCOUNTER — LAB (OUTPATIENT)
Dept: LAB | Facility: HOSPITAL | Age: 38
End: 2023-05-18
Payer: OTHER GOVERNMENT

## 2023-05-18 VITALS
SYSTOLIC BLOOD PRESSURE: 112 MMHG | HEART RATE: 100 BPM | HEIGHT: 68 IN | TEMPERATURE: 98 F | BODY MASS INDEX: 22.85 KG/M2 | WEIGHT: 150.8 LBS | DIASTOLIC BLOOD PRESSURE: 77 MMHG | OXYGEN SATURATION: 95 %

## 2023-05-18 DIAGNOSIS — Z01.818 PRE-OPERATIVE EXAMINATION: Primary | ICD-10-CM

## 2023-05-18 DIAGNOSIS — Z01.818 PRE-OPERATIVE EXAMINATION: ICD-10-CM

## 2023-05-18 LAB — COTININE UR-MCNC: NEGATIVE NG/ML

## 2023-05-18 PROCEDURE — G0480 DRUG TEST DEF 1-7 CLASSES: HCPCS

## 2023-05-18 RX ORDER — OXYCODONE HYDROCHLORIDE AND ACETAMINOPHEN 5; 325 MG/1; MG/1
1 TABLET ORAL EVERY 6 HOURS PRN
Qty: 28 TABLET | Refills: 0 | Status: SHIPPED | OUTPATIENT
Start: 2023-05-18

## 2023-05-18 RX ORDER — CEPHALEXIN 500 MG/1
500 CAPSULE ORAL 4 TIMES DAILY
Qty: 20 CAPSULE | Refills: 0 | Status: SHIPPED | OUTPATIENT
Start: 2023-05-18 | End: 2023-05-23

## 2023-05-18 RX ORDER — PROMETHAZINE HYDROCHLORIDE 25 MG/1
25 TABLET ORAL EVERY 6 HOURS PRN
Qty: 20 TABLET | Refills: 0 | Status: SHIPPED | OUTPATIENT
Start: 2023-05-18

## 2023-05-23 ENCOUNTER — OFFICE VISIT (OUTPATIENT)
Dept: PSYCHIATRY | Facility: CLINIC | Age: 38
End: 2023-05-23
Payer: COMMERCIAL

## 2023-05-23 VITALS
HEIGHT: 68 IN | BODY MASS INDEX: 22.61 KG/M2 | SYSTOLIC BLOOD PRESSURE: 126 MMHG | WEIGHT: 149.2 LBS | HEART RATE: 98 BPM | DIASTOLIC BLOOD PRESSURE: 75 MMHG

## 2023-05-23 DIAGNOSIS — F90.0 ADHD (ATTENTION DEFICIT HYPERACTIVITY DISORDER), INATTENTIVE TYPE: ICD-10-CM

## 2023-05-23 DIAGNOSIS — F51.05 INSOMNIA DUE TO MENTAL CONDITION: ICD-10-CM

## 2023-05-23 DIAGNOSIS — F41.1 GENERALIZED ANXIETY DISORDER: Primary | ICD-10-CM

## 2023-05-23 DIAGNOSIS — F43.10 POST TRAUMATIC STRESS DISORDER (PTSD): ICD-10-CM

## 2023-05-23 DIAGNOSIS — S06.9X9D TRAUMATIC BRAIN INJURY WITH LOSS OF CONSCIOUSNESS, SUBSEQUENT ENCOUNTER: ICD-10-CM

## 2023-05-23 DIAGNOSIS — F41.0 PANIC ATTACKS: ICD-10-CM

## 2023-05-23 DIAGNOSIS — F33.1 MAJOR DEPRESSIVE DISORDER, RECURRENT EPISODE, MODERATE: ICD-10-CM

## 2023-05-23 NOTE — PATIENT INSTRUCTIONS
1.  Please return to clinic at your next scheduled visit.  Contact the clinic ([phone]) at least 24 hours prior in the event you need to cancel.  2.  Do no harm to yourself or others.    3.  Avoid alcohol and drugs.    4.  Take all medications as prescribed.  Please contact the clinic with any concerns. If you are in need of medication refills, please call the clinic at [phone].    5. Should you want to get in touch with your provider, Dr. Sarita Schultz, please utilize [employer] or contact the office ([phone]), and staff will be able to page Dr. Schultz directly.  6.  In the event you have personal crisis, contact the following crisis numbers: Suicide Prevention Hotline 1-554.333.1758; [facility] Helpline 9-101-330-LEONCIO; [facility] Emergency Room [phone]; text HELLO to 779314; or 243.
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
## 2023-06-06 NOTE — PROGRESS NOTES
"Chief Complaint  Post-op Follow-up (1wk post op follow up )    Subjective          History of Present Illness  Seda Mobley is a 38 y.o. female who presents to [facility] for Postoperative Follow-Up of SCAR REVISION TRUNK, abdomen 6/9/23.    Pt presents today for 1 week post op,  Patient without complaints.  States there is a small area of superficial edge separation when she stretched, but otherwise no drainage.  No fevers or chills.  Normal appetite.    Allergies: Bee venom, Latex, Metronidazole, Omeprazole, and Paroxetine  Allergies Reconciled.    Review of Systems   All review of system has been reviewed and it  is negative except the ones note above.     Objective     /77 (BP Location: Left arm)   Pulse 102   Temp 97.7 °F (36.5 °C) (Temporal)   Ht 172.7 cm (68\")   Wt 65.4 kg (144 lb 3.2 oz)   SpO2 98%   BMI 21.93 kg/m²     Body mass index is 21.93 kg/m².    Physical Exam   Cardiovascular: Normal rate.     Pulmonary/Chest  Effort normal.   Abdomen   incision healing well, no swelling, no erythema, no drainage.  Small area of edge separation in the middle of the abdomen.  Less than a millimeter.  No surrounding cellulitis or induration.  No reactive erythema.  No drainage, no fluctuance.    Result Review :                Assessment and Plan      Diagnoses and all orders for this visit:    1. Postoperative follow-up (Primary)        Plan:  Doing well with no worrisome changes  No evidence of infection or wound complication  The result is already significantly better than it was from the preoperative scar.  Patient satisfied with early result        Follow Up     Return in about 4 weeks (around 7/14/2023).    Patient was given instructions and counseling regarding her condition. Please see specific information pulled into the AVS if appropriate.     Zia Fragoso MD  06/16/2023   "

## 2023-06-09 ENCOUNTER — HOSPITAL ENCOUNTER (OUTPATIENT)
Facility: HOSPITAL | Age: 38
Setting detail: HOSPITAL OUTPATIENT SURGERY
Discharge: HOME | End: 2023-06-09
Attending: SURGERY | Admitting: SURGERY
Payer: OTHER GOVERNMENT

## 2023-06-09 ENCOUNTER — ANESTHESIA (OUTPATIENT)
Dept: PERIOP | Facility: HOSPITAL | Age: 38
End: 2023-06-09
Payer: OTHER GOVERNMENT

## 2023-06-09 ENCOUNTER — ANESTHESIA EVENT (OUTPATIENT)
Dept: PERIOP | Facility: HOSPITAL | Age: 38
End: 2023-06-09
Payer: OTHER GOVERNMENT

## 2023-06-09 VITALS
SYSTOLIC BLOOD PRESSURE: 99 MMHG | DIASTOLIC BLOOD PRESSURE: 65 MMHG | WEIGHT: 146.61 LBS | HEART RATE: 65 BPM | OXYGEN SATURATION: 97 % | HEIGHT: 68 IN | RESPIRATION RATE: 19 BRPM | BODY MASS INDEX: 22.22 KG/M2 | TEMPERATURE: 96.9 F

## 2023-06-09 DIAGNOSIS — R52 PAINFUL CUTANEOUS SCAR: ICD-10-CM

## 2023-06-09 DIAGNOSIS — L90.5 PAINFUL CUTANEOUS SCAR: ICD-10-CM

## 2023-06-09 LAB
B-HCG UR QL: NEGATIVE
COTININE UR-MCNC: NEGATIVE NG/ML

## 2023-06-09 PROCEDURE — 25010000002 PROPOFOL 10 MG/ML EMULSION: Performed by: NURSE ANESTHETIST, CERTIFIED REGISTERED

## 2023-06-09 PROCEDURE — 13101 CMPLX RPR TRUNK 2.6-7.5 CM: CPT | Performed by: SURGERY

## 2023-06-09 PROCEDURE — G0480 DRUG TEST DEF 1-7 CLASSES: HCPCS | Performed by: SURGERY

## 2023-06-09 PROCEDURE — 0 HYDROMORPHONE 1 MG/ML SOLUTION: Performed by: NURSE ANESTHETIST, CERTIFIED REGISTERED

## 2023-06-09 PROCEDURE — 25010000002 HYDROMORPHONE 1 MG/ML SOLUTION: Performed by: NURSE ANESTHETIST, CERTIFIED REGISTERED

## 2023-06-09 PROCEDURE — 13102 CMPLX RPR TRUNK ADDL 5CM/<: CPT | Performed by: SURGERY

## 2023-06-09 PROCEDURE — 25010000002 MIDAZOLAM PER 1MG: Performed by: ANESTHESIOLOGY

## 2023-06-09 PROCEDURE — 88304 TISSUE EXAM BY PATHOLOGIST: CPT | Performed by: SURGERY

## 2023-06-09 PROCEDURE — 25010000002 SUGAMMADEX 200 MG/2ML SOLUTION: Performed by: NURSE ANESTHETIST, CERTIFIED REGISTERED

## 2023-06-09 PROCEDURE — 25010000002 DEXAMETHASONE PER 1 MG: Performed by: NURSE ANESTHETIST, CERTIFIED REGISTERED

## 2023-06-09 PROCEDURE — 25010000002 ONDANSETRON PER 1 MG: Performed by: NURSE ANESTHETIST, CERTIFIED REGISTERED

## 2023-06-09 PROCEDURE — 25010000002 FENTANYL CITRATE (PF) 50 MCG/ML SOLUTION: Performed by: NURSE ANESTHETIST, CERTIFIED REGISTERED

## 2023-06-09 PROCEDURE — 25010000002 CEFAZOLIN IN DEXTROSE 2-4 GM/100ML-% SOLUTION: Performed by: SURGERY

## 2023-06-09 PROCEDURE — 81025 URINE PREGNANCY TEST: CPT | Performed by: ANESTHESIOLOGY

## 2023-06-09 DEVICE — DEV CONTRL TISS STRATAFIX SPIRAL PDS PLS CT1 2-0 45CM: Type: IMPLANTABLE DEVICE | Site: ABDOMEN | Status: FUNCTIONAL

## 2023-06-09 RX ORDER — OXYCODONE HYDROCHLORIDE 5 MG/1
5 TABLET ORAL
Status: DISCONTINUED | OUTPATIENT
Start: 2023-06-09 | End: 2023-06-09 | Stop reason: HOSPADM

## 2023-06-09 RX ORDER — ONDANSETRON 2 MG/ML
INJECTION INTRAMUSCULAR; INTRAVENOUS AS NEEDED
Status: DISCONTINUED | OUTPATIENT
Start: 2023-06-09 | End: 2023-06-09 | Stop reason: SURG

## 2023-06-09 RX ORDER — PROMETHAZINE HYDROCHLORIDE 25 MG/1
25 SUPPOSITORY RECTAL ONCE AS NEEDED
Status: DISCONTINUED | OUTPATIENT
Start: 2023-06-09 | End: 2023-06-09 | Stop reason: HOSPADM

## 2023-06-09 RX ORDER — SODIUM CHLORIDE, SODIUM LACTATE, POTASSIUM CHLORIDE, CALCIUM CHLORIDE 600; 310; 30; 20 MG/100ML; MG/100ML; MG/100ML; MG/100ML
9 INJECTION, SOLUTION INTRAVENOUS CONTINUOUS PRN
Status: DISCONTINUED | OUTPATIENT
Start: 2023-06-09 | End: 2023-06-09 | Stop reason: HOSPADM

## 2023-06-09 RX ORDER — LIDOCAINE HYDROCHLORIDE 20 MG/ML
INJECTION, SOLUTION EPIDURAL; INFILTRATION; INTRACAUDAL; PERINEURAL AS NEEDED
Status: DISCONTINUED | OUTPATIENT
Start: 2023-06-09 | End: 2023-06-09 | Stop reason: SURG

## 2023-06-09 RX ORDER — KETAMINE HCL IN NACL, ISO-OSM 100MG/10ML
SYRINGE (ML) INJECTION AS NEEDED
Status: DISCONTINUED | OUTPATIENT
Start: 2023-06-09 | End: 2023-06-09 | Stop reason: SURG

## 2023-06-09 RX ORDER — ACETAMINOPHEN 500 MG
1000 TABLET ORAL ONCE
Status: COMPLETED | OUTPATIENT
Start: 2023-06-09 | End: 2023-06-09

## 2023-06-09 RX ORDER — ROCURONIUM BROMIDE 10 MG/ML
INJECTION, SOLUTION INTRAVENOUS AS NEEDED
Status: DISCONTINUED | OUTPATIENT
Start: 2023-06-09 | End: 2023-06-09 | Stop reason: SURG

## 2023-06-09 RX ORDER — PROMETHAZINE HYDROCHLORIDE 12.5 MG/1
25 TABLET ORAL ONCE AS NEEDED
Status: DISCONTINUED | OUTPATIENT
Start: 2023-06-09 | End: 2023-06-09 | Stop reason: HOSPADM

## 2023-06-09 RX ORDER — SCOLOPAMINE TRANSDERMAL SYSTEM 1 MG/1
1 PATCH, EXTENDED RELEASE TRANSDERMAL ONCE
Status: DISCONTINUED | OUTPATIENT
Start: 2023-06-09 | End: 2023-06-09 | Stop reason: HOSPADM

## 2023-06-09 RX ORDER — FENTANYL CITRATE 50 UG/ML
INJECTION, SOLUTION INTRAMUSCULAR; INTRAVENOUS AS NEEDED
Status: DISCONTINUED | OUTPATIENT
Start: 2023-06-09 | End: 2023-06-09 | Stop reason: SURG

## 2023-06-09 RX ORDER — DEXMEDETOMIDINE HYDROCHLORIDE 100 UG/ML
INJECTION, SOLUTION INTRAVENOUS AS NEEDED
Status: DISCONTINUED | OUTPATIENT
Start: 2023-06-09 | End: 2023-06-09 | Stop reason: SURG

## 2023-06-09 RX ORDER — MIDAZOLAM HYDROCHLORIDE 2 MG/2ML
2 INJECTION, SOLUTION INTRAMUSCULAR; INTRAVENOUS ONCE
Status: COMPLETED | OUTPATIENT
Start: 2023-06-09 | End: 2023-06-09

## 2023-06-09 RX ORDER — ONDANSETRON 2 MG/ML
4 INJECTION INTRAMUSCULAR; INTRAVENOUS ONCE AS NEEDED
Status: DISCONTINUED | OUTPATIENT
Start: 2023-06-09 | End: 2023-06-09 | Stop reason: HOSPADM

## 2023-06-09 RX ORDER — PROPOFOL 10 MG/ML
VIAL (ML) INTRAVENOUS AS NEEDED
Status: DISCONTINUED | OUTPATIENT
Start: 2023-06-09 | End: 2023-06-09 | Stop reason: SURG

## 2023-06-09 RX ORDER — CEFAZOLIN SODIUM 2 G/100ML
2 INJECTION, SOLUTION INTRAVENOUS ONCE
Status: COMPLETED | OUTPATIENT
Start: 2023-06-09 | End: 2023-06-09

## 2023-06-09 RX ORDER — MEPERIDINE HYDROCHLORIDE 25 MG/ML
12.5 INJECTION INTRAMUSCULAR; INTRAVENOUS; SUBCUTANEOUS
Status: DISCONTINUED | OUTPATIENT
Start: 2023-06-09 | End: 2023-06-09 | Stop reason: HOSPADM

## 2023-06-09 RX ORDER — DEXAMETHASONE SODIUM PHOSPHATE 4 MG/ML
INJECTION, SOLUTION INTRA-ARTICULAR; INTRALESIONAL; INTRAMUSCULAR; INTRAVENOUS; SOFT TISSUE AS NEEDED
Status: DISCONTINUED | OUTPATIENT
Start: 2023-06-09 | End: 2023-06-09 | Stop reason: SURG

## 2023-06-09 RX ADMIN — DEXMEDETOMIDINE HYDROCHLORIDE 20 MCG: 100 INJECTION, SOLUTION, CONCENTRATE INTRAVENOUS at 08:16

## 2023-06-09 RX ADMIN — SODIUM CHLORIDE, POTASSIUM CHLORIDE, SODIUM LACTATE AND CALCIUM CHLORIDE 9 ML/HR: 600; 310; 30; 20 INJECTION, SOLUTION INTRAVENOUS at 07:09

## 2023-06-09 RX ADMIN — Medication 25 MG: at 07:48

## 2023-06-09 RX ADMIN — LIDOCAINE HYDROCHLORIDE 50 MG: 20 INJECTION, SOLUTION EPIDURAL; INFILTRATION; INTRACAUDAL; PERINEURAL at 07:31

## 2023-06-09 RX ADMIN — ROCURONIUM BROMIDE 50 MG: 10 INJECTION, SOLUTION INTRAVENOUS at 07:31

## 2023-06-09 RX ADMIN — FENTANYL CITRATE 50 MCG: 50 INJECTION, SOLUTION INTRAMUSCULAR; INTRAVENOUS at 07:48

## 2023-06-09 RX ADMIN — PROPOFOL 150 MCG/KG/MIN: 10 INJECTION, EMULSION INTRAVENOUS at 07:33

## 2023-06-09 RX ADMIN — CEFAZOLIN SODIUM 2 G: 2 INJECTION, SOLUTION INTRAVENOUS at 07:28

## 2023-06-09 RX ADMIN — ONDANSETRON 4 MG: 2 INJECTION INTRAMUSCULAR; INTRAVENOUS at 07:51

## 2023-06-09 RX ADMIN — SCOLOPAMINE TRANSDERMAL SYSTEM 1 PATCH: 1 PATCH, EXTENDED RELEASE TRANSDERMAL at 07:09

## 2023-06-09 RX ADMIN — HYDROMORPHONE HYDROCHLORIDE 0.5 MG: 1 INJECTION, SOLUTION INTRAMUSCULAR; INTRAVENOUS; SUBCUTANEOUS at 09:06

## 2023-06-09 RX ADMIN — PROPOFOL 150 MG: 10 INJECTION, EMULSION INTRAVENOUS at 07:31

## 2023-06-09 RX ADMIN — MIDAZOLAM HYDROCHLORIDE 2 MG: 1 INJECTION, SOLUTION INTRAMUSCULAR; INTRAVENOUS at 07:10

## 2023-06-09 RX ADMIN — ROCURONIUM BROMIDE 20 MG: 10 INJECTION, SOLUTION INTRAVENOUS at 08:16

## 2023-06-09 RX ADMIN — ACETAMINOPHEN 1000 MG: 500 TABLET ORAL at 07:09

## 2023-06-09 RX ADMIN — DEXMEDETOMIDINE HYDROCHLORIDE 20 MCG: 100 INJECTION, SOLUTION, CONCENTRATE INTRAVENOUS at 07:31

## 2023-06-09 RX ADMIN — OXYCODONE HYDROCHLORIDE 5 MG: 5 TABLET ORAL at 09:06

## 2023-06-09 RX ADMIN — FENTANYL CITRATE 50 MCG: 50 INJECTION, SOLUTION INTRAMUSCULAR; INTRAVENOUS at 07:31

## 2023-06-09 RX ADMIN — DEXAMETHASONE SODIUM PHOSPHATE 4 MG: 4 INJECTION, SOLUTION INTRA-ARTICULAR; INTRALESIONAL; INTRAMUSCULAR; INTRAVENOUS; SOFT TISSUE at 07:51

## 2023-06-09 RX ADMIN — HYDROMORPHONE HYDROCHLORIDE 1 MG: 1 INJECTION, SOLUTION INTRAMUSCULAR; INTRAVENOUS; SUBCUTANEOUS at 08:16

## 2023-06-09 RX ADMIN — SUGAMMADEX 200 MG: 100 INJECTION, SOLUTION INTRAVENOUS at 08:37

## 2023-06-09 NOTE — ANESTHESIA POSTPROCEDURE EVALUATION
Patient: Seda Mobley    Procedure Summary       Date: 06/09/23 Room / Location: [facility]  / [facility]    Anesthesia Start: 0728 Anesthesia Stop: 0851    Procedure: SCAR REVISION TRUNK, abdomen (Abdomen) Diagnosis:       Painful cutaneous scar      (Painful cutaneous scar [R52, L90.5])    Surgeons: Zia Fragoso MD Provider: Sherie Sandy MD    Anesthesia Type: general ASA Status: 2            Anesthesia Type: general    Vitals  Vitals Value Taken Time   BP 99/58 06/09/23 0903   Temp 36.1 °C (96.9 °F) 06/09/23 0848   Pulse 86 06/09/23 0907   Resp 16 06/09/23 0858   SpO2 99 % 06/09/23 0907   Vitals shown include unvalidated device data.        Post Anesthesia Care and Evaluation    Patient location during evaluation: bedside  Patient participation: complete - patient participated  Level of consciousness: awake  Pain management: adequate    Airway patency: patent  PONV Status: none  Cardiovascular status: acceptable and stable  Respiratory status: acceptable  Hydration status: acceptable    Comments: An Anesthesiologist personally participated in the most demanding procedures (including induction and emergence if applicable) in the anesthesia plan, monitored the course of anesthesia administration at frequent intervals and remained physically present and available for immediate diagnosis and treatment of emergencies.

## 2023-06-09 NOTE — DISCHARGE INSTRUCTIONS
DISCHARGE INSTRUCTIONS  SURGICAL / AMBULATORY  PROCEDURES      For your surgery you had:  General anesthesia (you may have a sore throat for the first 24 hours)  You received a medicated patch for nausea prevention today (behind your ear). It is recommended that you remove it 24-48 hours post-operatively. It must be removed within 72 hours.   You may experience dizziness, drowsiness, or light-headedness for several hours following surgery/procedure.  Do not stay alone today or tonight.  Limit your activity for 24 hours.  Resume your diet slowly.  Follow whatever special dietary instructions you may have been given by your doctor.  You should not drive or operate machinery, drink alcohol, or sign legally binding documents for 24 hours or while you are taking pain medication.  Last dose of pain medication was given at:   .  NOTIFY YOUR DOCTOR IF YOU EXPERIENCE ANY OF THE FOLLOWING:  Temperature greater than 101 degrees Fahrenheit  Shaking Chills  Redness or excessive drainage from incision  Nausea, vomiting and/or pain that is not controlled by prescribed medications  Increase in bleeding or bleeding that is excessive  Unable to urinate in 6 hours after surgery  If unable to reach your doctor, please go to the closest Emergency Room    You may remove dressing in 48 hours  You may shower 48 hours  .  Medications per physician instructions as indicated on Discharge Medication Information Sheet.    SPECIAL INSTRUCTIONS:

## 2023-06-09 NOTE — INTERVAL H&P NOTE
H&P reviewed. The patient was examined and there are no changes to the H&P.      Patient marked in the holding area.   present throughout.  Nurse present throughout.  The risks and benefits of the procedure were discussed with the patient.  The risk described included, but not limited to, bleeding, infection, need for revision surgeries, seroma, hematoma, poor cosmetic result, poor functional result, edge necrosis, edge separation, deep venous thrombosis, pulmonary embolus, pneumonia, heart attack, stroke, death.  Her questions were answered. She still agrees to the procedure.    We will attempt to keep the umbilicus but there will be significant distortion around this area as a consequence of the scar revision.  The patient states she is fine if the umbilicus is sacrificed.
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
## 2023-06-14 ENCOUNTER — OFFICE VISIT (OUTPATIENT)
Dept: FAMILY MEDICINE CLINIC | Facility: CLINIC | Age: 38
End: 2023-06-14
Payer: COMMERCIAL

## 2023-06-14 VITALS
TEMPERATURE: 97.9 F | HEIGHT: 68 IN | DIASTOLIC BLOOD PRESSURE: 78 MMHG | OXYGEN SATURATION: 99 % | HEART RATE: 118 BPM | SYSTOLIC BLOOD PRESSURE: 112 MMHG | WEIGHT: 143.8 LBS | BODY MASS INDEX: 21.79 KG/M2

## 2023-06-14 DIAGNOSIS — M50.30 DDD (DEGENERATIVE DISC DISEASE), CERVICAL: ICD-10-CM

## 2023-06-14 DIAGNOSIS — G62.9 NEUROPATHY: ICD-10-CM

## 2023-06-14 DIAGNOSIS — M54.42 CHRONIC BILATERAL LOW BACK PAIN WITH LEFT-SIDED SCIATICA: ICD-10-CM

## 2023-06-14 DIAGNOSIS — G89.29 CHRONIC BILATERAL LOW BACK PAIN WITH LEFT-SIDED SCIATICA: ICD-10-CM

## 2023-06-14 PROCEDURE — 99213 OFFICE O/P EST LOW 20 MIN: CPT | Performed by: NURSE PRACTITIONER

## 2023-06-14 RX ORDER — GABAPENTIN 300 MG/1
300 CAPSULE ORAL 3 TIMES DAILY
Qty: 90 CAPSULE | Refills: 2 | Status: SHIPPED | OUTPATIENT
Start: 2023-06-14

## 2023-06-14 RX ORDER — CYCLOBENZAPRINE HCL 10 MG
10 TABLET ORAL 2 TIMES DAILY PRN
Qty: 60 TABLET | Refills: 1 | Status: SHIPPED | OUTPATIENT
Start: 2023-06-14

## 2023-06-14 NOTE — PROGRESS NOTES
Chief Complaint  Back Pain (Sciatica on left lower back and muscle spasms on right side of lower back)    Subjective         Seda Mobley presents to [facility]    Presents today for follow-up on low back pain.  She has chronic low back pain with sciatica on the left side.  She has been doing physical therapy which has been helping out.  She is noted that she has been having muscle spasms in her back.  She recently had surgery last week for a scar revision with plastic surgery.  She is not doing physical therapy since the surgery.  Some of her back pain has improved since she has been on bedrest.    Back Pain  This is a chronic problem. The current episode started more than 1 year ago. The problem occurs constantly. The problem has been waxing and waning since onset. The pain is present in the gluteal, lumbar spine and sacro-iliac. The quality of the pain is described as aching, burning, cramping, shooting and stabbing. The pain radiates to the left thigh. The pain is at a severity of 9/10. The symptoms are aggravated by bending, position, lying down, sitting, standing and twisting. Stiffness is present All day. Associated symptoms include headaches, leg pain, numbness, paresthesias, pelvic pain, tingling and weakness. Pertinent negatives include no abdominal pain, bladder incontinence, bowel incontinence, chest pain, dysuria, fever, paresis, perianal numbness or weight loss.        Social History     Socioeconomic History    Marital status:    Tobacco Use    Smoking status: Former     Packs/day: 1.00     Years: 22.00     Pack years: 22.00     Types: Cigarettes     Quit date: 2023     Years since quittin.4    Smokeless tobacco: Never    Tobacco comments:     I am tryin to work towards quiting on my own; in the future   Vaping Use    Vaping Use: Some days    Substances: CBD, Flavoring    Devices: Pre-filled pod   Substance and Sexual Activity    Alcohol use: Yes      "Comment: Socially, occasionally    Drug use: Never    Sexual activity: Defer        Objective     Vitals:    06/14/23 0738   BP: 112/78   BP Location: Left arm   Patient Position: Sitting   Cuff Size: Adult   Pulse: 118   Temp: 97.9 °F (36.6 °C)   TempSrc: Oral   SpO2: 99%   Weight: 65.2 kg (143 lb 12.8 oz)   Height: 172.7 cm (68\")        Body mass index is 21.86 kg/m².    Wt Readings from Last 3 Encounters:   06/14/23 65.2 kg (143 lb 12.8 oz)   06/09/23 66.5 kg (146 lb 9.7 oz)   06/02/23 67 kg (147 lb 11.3 oz)       BP Readings from Last 3 Encounters:   06/14/23 112/78   06/09/23 99/65   06/02/23 112/64         Physical Exam  Vitals reviewed.   Constitutional:       Appearance: Normal appearance. She is well-developed.   HENT:      Head: Normocephalic and atraumatic.      Right Ear: External ear normal.      Left Ear: External ear normal.      Mouth/Throat:      Pharynx: No oropharyngeal exudate.   Eyes:      Conjunctiva/sclera: Conjunctivae normal.      Pupils: Pupils are equal, round, and reactive to light.   Cardiovascular:      Rate and Rhythm: Normal rate and regular rhythm.      Heart sounds: No murmur heard.    No friction rub. No gallop.   Pulmonary:      Effort: Pulmonary effort is normal.      Breath sounds: Normal breath sounds. No wheezing or rhonchi.   Abdominal:      General: Bowel sounds are normal. There is no distension.      Palpations: Abdomen is soft.      Tenderness: There is no abdominal tenderness.   Musculoskeletal:      Lumbar back: Spasms and tenderness present.   Skin:     General: Skin is warm and dry.   Neurological:      Mental Status: She is alert and oriented to person, place, and time.   Psychiatric:         Mood and Affect: Mood and affect normal.         Behavior: Behavior normal.         Thought Content: Thought content normal.         Judgment: Judgment normal.        Result Review :   The following data was reviewed by: ALDAIR Bey on " 06/14/2023:      Procedures    Assessment and Plan   Diagnoses and all orders for this visit:    1. Neuropathy  -     gabapentin (NEURONTIN) 300 MG capsule; Take 1 capsule by mouth 3 (Three) Times a Day.  Dispense: 90 capsule; Refill: 2    2. DDD (degenerative disc disease), cervical  -     gabapentin (NEURONTIN) 300 MG capsule; Take 1 capsule by mouth 3 (Three) Times a Day.  Dispense: 90 capsule; Refill: 2    3. Chronic bilateral low back pain with left-sided sciatica  -     cyclobenzaprine (FLEXERIL) 10 MG tablet; Take 1 tablet by mouth 2 (Two) Times a Day As Needed for Muscle Spasms.  Dispense: 60 tablet; Refill: 1  -     gabapentin (NEURONTIN) 300 MG capsule; Take 1 capsule by mouth 3 (Three) Times a Day.  Dispense: 90 capsule; Refill: 2      Refilled the Flexeril 10 mg twice daily as needed.  Increase the frequency of gabapentin 300 mg 3 times a day for the sciatica pain and neuropathy.  Ronan reviewed.  Last UDS was 5 months ago.    BMI is within normal parameters. No other follow-up for BMI required.        Follow Up   Return in about 2 months (around 8/14/2023), or if symptoms worsen or fail to improve, for Next scheduled follow up.  Patient was given instructions and counseling regarding her condition or for health maintenance advice. Please see specific information pulled into the AVS if appropriate.     Please note that portions of this note were completed with a voice recognition program.

## 2023-06-16 ENCOUNTER — OFFICE VISIT (OUTPATIENT)
Dept: PLASTIC SURGERY | Facility: CLINIC | Age: 38
End: 2023-06-16
Payer: COMMERCIAL

## 2023-06-16 VITALS
OXYGEN SATURATION: 98 % | DIASTOLIC BLOOD PRESSURE: 77 MMHG | BODY MASS INDEX: 21.86 KG/M2 | TEMPERATURE: 97.7 F | HEART RATE: 102 BPM | SYSTOLIC BLOOD PRESSURE: 115 MMHG | HEIGHT: 68 IN | WEIGHT: 144.2 LBS

## 2023-06-16 DIAGNOSIS — Z09 POSTOPERATIVE FOLLOW-UP: Primary | ICD-10-CM

## 2023-06-16 PROCEDURE — 99024 POSTOP FOLLOW-UP VISIT: CPT | Performed by: SURGERY

## 2023-08-04 ENCOUNTER — TELEMEDICINE (OUTPATIENT)
Dept: PSYCHIATRY | Facility: CLINIC | Age: 38
End: 2023-08-04
Payer: OTHER GOVERNMENT

## 2023-08-04 DIAGNOSIS — F51.05 INSOMNIA DUE TO MENTAL CONDITION: ICD-10-CM

## 2023-08-04 DIAGNOSIS — F90.0 ADHD (ATTENTION DEFICIT HYPERACTIVITY DISORDER), INATTENTIVE TYPE: Primary | ICD-10-CM

## 2023-08-04 DIAGNOSIS — F41.1 GENERALIZED ANXIETY DISORDER: ICD-10-CM

## 2023-08-04 DIAGNOSIS — F43.10 POST TRAUMATIC STRESS DISORDER (PTSD): ICD-10-CM

## 2023-08-04 DIAGNOSIS — F41.0 PANIC ATTACKS: ICD-10-CM

## 2023-08-04 DIAGNOSIS — S06.9X9D TRAUMATIC BRAIN INJURY WITH LOSS OF CONSCIOUSNESS, SUBSEQUENT ENCOUNTER: ICD-10-CM

## 2023-08-04 DIAGNOSIS — F33.1 MAJOR DEPRESSIVE DISORDER, RECURRENT EPISODE, MODERATE: ICD-10-CM

## 2023-08-04 RX ORDER — DEXTROAMPHETAMINE SACCHARATE, AMPHETAMINE ASPARTATE MONOHYDRATE, DEXTROAMPHETAMINE SULFATE AND AMPHETAMINE SULFATE 5; 5; 5; 5 MG/1; MG/1; MG/1; MG/1
20 CAPSULE, EXTENDED RELEASE ORAL DAILY
Qty: 30 CAPSULE | Refills: 0 | Status: SHIPPED | OUTPATIENT
Start: 2023-08-06

## 2023-08-15 ENCOUNTER — OFFICE VISIT (OUTPATIENT)
Dept: FAMILY MEDICINE CLINIC | Facility: CLINIC | Age: 38
End: 2023-08-15
Payer: OTHER GOVERNMENT

## 2023-08-15 VITALS
WEIGHT: 139.6 LBS | DIASTOLIC BLOOD PRESSURE: 68 MMHG | HEART RATE: 86 BPM | BODY MASS INDEX: 21.16 KG/M2 | HEIGHT: 68 IN | TEMPERATURE: 98 F | OXYGEN SATURATION: 98 % | SYSTOLIC BLOOD PRESSURE: 90 MMHG

## 2023-08-15 DIAGNOSIS — G89.29 CHRONIC CERVICAL PAIN: ICD-10-CM

## 2023-08-15 DIAGNOSIS — G62.9 NEUROPATHY: ICD-10-CM

## 2023-08-15 DIAGNOSIS — M54.42 CHRONIC BILATERAL LOW BACK PAIN WITH LEFT-SIDED SCIATICA: Primary | ICD-10-CM

## 2023-08-15 DIAGNOSIS — M50.30 DDD (DEGENERATIVE DISC DISEASE), CERVICAL: ICD-10-CM

## 2023-08-15 DIAGNOSIS — M54.16 LUMBAR RADICULOPATHY: ICD-10-CM

## 2023-08-15 DIAGNOSIS — M54.2 CHRONIC CERVICAL PAIN: ICD-10-CM

## 2023-08-15 DIAGNOSIS — G89.29 CHRONIC BILATERAL LOW BACK PAIN WITH LEFT-SIDED SCIATICA: Primary | ICD-10-CM

## 2023-08-15 PROCEDURE — 99213 OFFICE O/P EST LOW 20 MIN: CPT | Performed by: NURSE PRACTITIONER

## 2023-08-15 RX ORDER — RIMEGEPANT SULFATE 75 MG/75MG
1 TABLET, ORALLY DISINTEGRATING ORAL
COMMUNITY
Start: 2023-06-29
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
## 2023-08-31 DIAGNOSIS — F90.0 ADHD (ATTENTION DEFICIT HYPERACTIVITY DISORDER), INATTENTIVE TYPE: ICD-10-CM

## 2023-08-31 DIAGNOSIS — F51.05 INSOMNIA DUE TO MENTAL CONDITION: ICD-10-CM

## 2023-08-31 DIAGNOSIS — F41.1 GENERALIZED ANXIETY DISORDER: ICD-10-CM

## 2023-08-31 RX ORDER — GUANFACINE 1 MG/1
TABLET, EXTENDED RELEASE ORAL
Qty: 30 TABLET | Refills: 5 | OUTPATIENT
Start: 2023-08-31

## 2023-08-31 NOTE — TELEPHONE ENCOUNTER
Medication refill request for Guanfacine 1mg which appears to have been discontinued. Order refused and pended.

## 2023-09-01 ENCOUNTER — OFFICE VISIT (OUTPATIENT)
Dept: PSYCHIATRY | Facility: CLINIC | Age: 38
End: 2023-09-01
Payer: OTHER GOVERNMENT

## 2023-09-01 VITALS
SYSTOLIC BLOOD PRESSURE: 117 MMHG | HEIGHT: 68 IN | DIASTOLIC BLOOD PRESSURE: 70 MMHG | HEART RATE: 79 BPM | WEIGHT: 140.4 LBS | BODY MASS INDEX: 21.28 KG/M2

## 2023-09-01 DIAGNOSIS — S06.9X9D TRAUMATIC BRAIN INJURY WITH LOSS OF CONSCIOUSNESS, SUBSEQUENT ENCOUNTER: ICD-10-CM

## 2023-09-01 DIAGNOSIS — F90.0 ADHD (ATTENTION DEFICIT HYPERACTIVITY DISORDER), INATTENTIVE TYPE: Primary | ICD-10-CM

## 2023-09-01 DIAGNOSIS — F51.05 INSOMNIA DUE TO MENTAL CONDITION: ICD-10-CM

## 2023-09-01 DIAGNOSIS — F33.1 MAJOR DEPRESSIVE DISORDER, RECURRENT EPISODE, MODERATE: ICD-10-CM

## 2023-09-01 DIAGNOSIS — F41.0 PANIC ATTACKS: ICD-10-CM

## 2023-09-01 DIAGNOSIS — F41.1 GENERALIZED ANXIETY DISORDER: ICD-10-CM

## 2023-09-01 DIAGNOSIS — F43.10 POST TRAUMATIC STRESS DISORDER (PTSD): ICD-10-CM

## 2023-09-01 RX ORDER — DEXTROAMPHETAMINE SACCHARATE, AMPHETAMINE ASPARTATE MONOHYDRATE, DEXTROAMPHETAMINE SULFATE AND AMPHETAMINE SULFATE 6.25; 6.25; 6.25; 6.25 MG/1; MG/1; MG/1; MG/1
25 CAPSULE, EXTENDED RELEASE ORAL DAILY
Qty: 30 CAPSULE | Refills: 0 | Status: SHIPPED | OUTPATIENT
Start: 2023-09-06

## 2023-09-01 NOTE — PATIENT INSTRUCTIONS
1.  Please return to clinic at your next scheduled visit.  Contact the clinic ([phone]) at least 24 hours prior in the event you need to cancel.  2.  Do no harm to yourself or others.    3.  Avoid alcohol and drugs.    4.  Take all medications as prescribed.  Please contact the clinic with any concerns. If you are in need of medication refills, please call the clinic at [phone].    5. Should you want to get in touch with your provider, Dr. Sarita Schultz, please utilize [employer] or contact the office ([phone]), and staff will be able to page Dr. Schultz directly.  6.  In the event you have personal crisis, contact the following crisis numbers: Suicide Prevention Hotline 1-903.780.9749; [facility] Helpline 8-790-626-LEONCIO; [facility] Emergency Room [phone]; text HELLO to 447747; or 025.
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
## 2023-09-15 ENCOUNTER — HOSPITAL ENCOUNTER (OUTPATIENT)
Dept: MRI IMAGING | Facility: HOSPITAL | Age: 38
Discharge: HOME | End: 2023-09-15
Admitting: NURSE PRACTITIONER
Payer: OTHER GOVERNMENT

## 2023-09-15 DIAGNOSIS — M54.16 LUMBAR RADICULOPATHY: ICD-10-CM

## 2023-09-15 DIAGNOSIS — G89.29 CHRONIC BILATERAL LOW BACK PAIN WITH LEFT-SIDED SCIATICA: ICD-10-CM

## 2023-09-15 DIAGNOSIS — M54.42 CHRONIC BILATERAL LOW BACK PAIN WITH LEFT-SIDED SCIATICA: ICD-10-CM

## 2023-09-15 DIAGNOSIS — G62.9 NEUROPATHY: ICD-10-CM

## 2023-09-15 PROCEDURE — 72148 MRI LUMBAR SPINE W/O DYE: CPT

## 2023-10-02 DIAGNOSIS — F41.1 GENERALIZED ANXIETY DISORDER: ICD-10-CM

## 2023-10-02 RX ORDER — DIVALPROEX SODIUM 250 MG/1
250 TABLET, EXTENDED RELEASE ORAL NIGHTLY
Qty: 90 TABLET | Refills: 1 | OUTPATIENT
Start: 2023-10-02

## 2023-10-02 NOTE — TELEPHONE ENCOUNTER
divalproex (DEPAKOTE ER) 250 MG 24 hr tablet     Medication appears to have been discontinued, therefore this refill may not be appropriate. Order refused and pended.

## 2023-10-04 ENCOUNTER — TELEPHONE (OUTPATIENT)
Dept: FAMILY MEDICINE CLINIC | Facility: CLINIC | Age: 38
End: 2023-10-04
Payer: COMMERCIAL

## 2023-10-04 NOTE — TELEPHONE ENCOUNTER
Patient needs a Neurology Renewal Referral, last Referral to Neurology for Intractable chronic migraine without aura and without status migrainosus (04/07/2022) for continuity of care    [facility]  Asher Muniz MD  [facility] [address] [place], [place] [address] [phone] office  [phone] fax    Patient will be establishing care, PCP transfer of care -  Appointment with Bhupendra Hernandez APRN (12/07/2023)

## 2023-10-05 DIAGNOSIS — G43.709 CHRONIC MIGRAINE W/O AURA W/O STATUS MIGRAINOSUS, NOT INTRACTABLE: Primary | ICD-10-CM

## 2023-10-09 ENCOUNTER — TELEPHONE (OUTPATIENT)
Dept: PSYCHIATRY | Facility: CLINIC | Age: 38
End: 2023-10-09
Payer: COMMERCIAL

## 2023-10-09 DIAGNOSIS — F90.0 ADHD (ATTENTION DEFICIT HYPERACTIVITY DISORDER), INATTENTIVE TYPE: ICD-10-CM

## 2023-10-09 RX ORDER — DEXTROAMPHETAMINE SACCHARATE, AMPHETAMINE ASPARTATE MONOHYDRATE, DEXTROAMPHETAMINE SULFATE AND AMPHETAMINE SULFATE 6.25; 6.25; 6.25; 6.25 MG/1; MG/1; MG/1; MG/1
25 CAPSULE, EXTENDED RELEASE ORAL DAILY
Qty: 30 CAPSULE | Refills: 0 | Status: SHIPPED | OUTPATIENT
Start: 2023-10-09

## 2023-10-09 NOTE — TELEPHONE ENCOUNTER
amphetamine-dextroamphetamine XR (ADDERALL XR) 25 MG 24 hr capsule (09/06/2023)       Pt called in regards to her adderall xr 25mg she states that she has ran out as of last Friday and needs a few to get her by until her follow up appt on 10/13/23 @ 10am.     Provider please advise.

## 2023-10-13 ENCOUNTER — OFFICE VISIT (OUTPATIENT)
Dept: PSYCHIATRY | Facility: CLINIC | Age: 38
End: 2023-10-13
Payer: COMMERCIAL

## 2023-10-13 VITALS
WEIGHT: 135.8 LBS | SYSTOLIC BLOOD PRESSURE: 113 MMHG | DIASTOLIC BLOOD PRESSURE: 77 MMHG | HEIGHT: 68 IN | BODY MASS INDEX: 20.58 KG/M2 | HEART RATE: 110 BPM

## 2023-10-13 DIAGNOSIS — F41.0 PANIC ATTACKS: ICD-10-CM

## 2023-10-13 DIAGNOSIS — F51.05 INSOMNIA DUE TO MENTAL CONDITION: ICD-10-CM

## 2023-10-13 DIAGNOSIS — F41.1 GENERALIZED ANXIETY DISORDER: ICD-10-CM

## 2023-10-13 DIAGNOSIS — S06.9X9D TRAUMATIC BRAIN INJURY WITH LOSS OF CONSCIOUSNESS, SUBSEQUENT ENCOUNTER: ICD-10-CM

## 2023-10-13 DIAGNOSIS — F90.0 ADHD (ATTENTION DEFICIT HYPERACTIVITY DISORDER), INATTENTIVE TYPE: Primary | ICD-10-CM

## 2023-10-13 DIAGNOSIS — F43.10 POST TRAUMATIC STRESS DISORDER (PTSD): ICD-10-CM

## 2023-10-13 DIAGNOSIS — F33.1 MAJOR DEPRESSIVE DISORDER, RECURRENT EPISODE, MODERATE: ICD-10-CM

## 2023-10-13 NOTE — PATIENT INSTRUCTIONS
1.  Please return to clinic at your next scheduled visit.  Contact the clinic ([phone]) at least 24 hours prior in the event you need to cancel.  2.  Do no harm to yourself or others.    3.  Avoid alcohol and drugs.    4.  Take all medications as prescribed.  Please contact the clinic with any concerns. If you are in need of medication refills, please call the clinic at [phone].    5. Should you want to get in touch with your provider, Dr. Sarita Schultz, please utilize [facility] or contact the office ([phone]), and staff will be able to page Dr. Schultz directly.  6.  In the event you have personal crisis, contact the following crisis numbers: Suicide Prevention Hotline 1-582.816.8576; [facility] Helpline 4-272-613-LEONCIO; [facility] Emergency Room [phone]; text HELLO to 460870; or 937.

## 2023-10-16 ENCOUNTER — OFFICE VISIT (OUTPATIENT)
Dept: CARDIOLOGY | Facility: CLINIC | Age: 38
End: 2023-10-16
Payer: COMMERCIAL

## 2023-10-16 VITALS
SYSTOLIC BLOOD PRESSURE: 102 MMHG | WEIGHT: 134 LBS | HEIGHT: 68 IN | BODY MASS INDEX: 20.31 KG/M2 | DIASTOLIC BLOOD PRESSURE: 67 MMHG | HEART RATE: 69 BPM

## 2023-10-16 DIAGNOSIS — R00.2 PALPITATIONS: Primary | ICD-10-CM

## 2023-10-16 PROBLEM — Z72.0 TOBACCO USE: Status: RESOLVED | Noted: 2022-02-08 | Resolved: 2023-10-16

## 2023-10-16 RX ORDER — MIDODRINE HYDROCHLORIDE 2.5 MG/1
2.5 TABLET ORAL DAILY
Qty: 30 TABLET | Refills: 3 | Status: SHIPPED | OUTPATIENT
Start: 2023-10-16

## 2023-10-16 RX ORDER — METOPROLOL SUCCINATE 25 MG/1
25 TABLET, EXTENDED RELEASE ORAL 2 TIMES DAILY
Qty: 180 TABLET | Refills: 3 | Status: CANCELLED | OUTPATIENT
Start: 2023-10-16
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
## 2023-11-07 DIAGNOSIS — F90.0 ADHD (ATTENTION DEFICIT HYPERACTIVITY DISORDER), INATTENTIVE TYPE: ICD-10-CM

## 2023-11-07 RX ORDER — DEXTROAMPHETAMINE SACCHARATE, AMPHETAMINE ASPARTATE MONOHYDRATE, DEXTROAMPHETAMINE SULFATE AND AMPHETAMINE SULFATE 6.25; 6.25; 6.25; 6.25 MG/1; MG/1; MG/1; MG/1
25 CAPSULE, EXTENDED RELEASE ORAL DAILY
Qty: 30 CAPSULE | Refills: 0 | Status: SHIPPED | OUTPATIENT
Start: 2023-11-08

## 2023-11-07 NOTE — TELEPHONE ENCOUNTER
Patient LMVM requesting a refill for her Adderall.  Patient has follow up scheduled for 11/09/2023.  Med pended Last refill 10/09/2023 according to chart Please review

## 2023-11-09 ENCOUNTER — OFFICE VISIT (OUTPATIENT)
Dept: PSYCHIATRY | Facility: CLINIC | Age: 38
End: 2023-11-09
Payer: COMMERCIAL

## 2023-11-09 VITALS
DIASTOLIC BLOOD PRESSURE: 78 MMHG | WEIGHT: 131.2 LBS | SYSTOLIC BLOOD PRESSURE: 126 MMHG | BODY MASS INDEX: 19.88 KG/M2 | HEART RATE: 105 BPM | HEIGHT: 68 IN

## 2023-11-09 DIAGNOSIS — F33.1 MAJOR DEPRESSIVE DISORDER, RECURRENT EPISODE, MODERATE: ICD-10-CM

## 2023-11-09 DIAGNOSIS — F90.0 ADHD (ATTENTION DEFICIT HYPERACTIVITY DISORDER), INATTENTIVE TYPE: Primary | ICD-10-CM

## 2023-11-09 DIAGNOSIS — F43.10 POST TRAUMATIC STRESS DISORDER (PTSD): ICD-10-CM

## 2023-11-09 DIAGNOSIS — S06.9X9D TRAUMATIC BRAIN INJURY WITH LOSS OF CONSCIOUSNESS, SUBSEQUENT ENCOUNTER: ICD-10-CM

## 2023-11-09 DIAGNOSIS — F41.1 GENERALIZED ANXIETY DISORDER: ICD-10-CM

## 2023-11-09 DIAGNOSIS — F41.0 PANIC ATTACKS: ICD-10-CM

## 2023-11-09 DIAGNOSIS — F51.05 INSOMNIA DUE TO MENTAL CONDITION: ICD-10-CM

## 2023-11-09 RX ORDER — METHYLPREDNISOLONE 4 MG/1
TABLET ORAL SEE ADMIN INSTRUCTIONS
COMMUNITY
Start: 2023-11-07

## 2023-11-09 RX ORDER — SUMATRIPTAN 100 MG/1
100 TABLET, FILM COATED ORAL
COMMUNITY
Start: 2023-10-30

## 2023-11-09 RX ORDER — DEXTROAMPHETAMINE SACCHARATE, AMPHETAMINE ASPARTATE, DEXTROAMPHETAMINE SULFATE AND AMPHETAMINE SULFATE 1.25; 1.25; 1.25; 1.25 MG/1; MG/1; MG/1; MG/1
5 TABLET ORAL DAILY
Qty: 30 TABLET | Refills: 0 | Status: SHIPPED | OUTPATIENT
Start: 2023-11-09

## 2023-11-09 NOTE — PATIENT INSTRUCTIONS
1.  Please return to clinic at your next scheduled visit.  Contact the clinic ([phone]) at least 24 hours prior in the event you need to cancel.  2.  Do no harm to yourself or others.    3.  Avoid alcohol and drugs.    4.  Take all medications as prescribed.  Please contact the clinic with any concerns. If you are in need of medication refills, please call the clinic at [phone].    5. Should you want to get in touch with your provider, Dr. Sarita Schultz, please utilize [facility] or contact the office ([phone]), and staff will be able to page Dr. Schultz directly.  6.  In the event you have personal crisis, contact the following crisis numbers: Suicide Prevention Hotline 1-325.953.2909; [facility] Helpline 3-930-770-LEONCIO; [facility] [phone]; text HELLO to 230710; or 306.
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
## 2023-11-10 ENCOUNTER — TRANSCRIBE ORDERS (OUTPATIENT)
Dept: ADMINISTRATIVE | Facility: HOSPITAL | Age: 38
End: 2023-11-10
Payer: COMMERCIAL

## 2023-11-10 DIAGNOSIS — M25.551 RIGHT HIP PAIN: Primary | ICD-10-CM

## 2023-12-07 ENCOUNTER — OFFICE VISIT (OUTPATIENT)
Dept: FAMILY MEDICINE CLINIC | Facility: CLINIC | Age: 38
End: 2023-12-07
Payer: COMMERCIAL

## 2023-12-07 ENCOUNTER — OFFICE VISIT (OUTPATIENT)
Dept: PSYCHIATRY | Facility: CLINIC | Age: 38
End: 2023-12-07
Payer: COMMERCIAL

## 2023-12-07 VITALS
SYSTOLIC BLOOD PRESSURE: 102 MMHG | OXYGEN SATURATION: 99 % | HEIGHT: 68 IN | TEMPERATURE: 97.9 F | BODY MASS INDEX: 19.85 KG/M2 | DIASTOLIC BLOOD PRESSURE: 68 MMHG | HEART RATE: 95 BPM | WEIGHT: 131 LBS

## 2023-12-07 VITALS
HEART RATE: 96 BPM | HEIGHT: 68 IN | WEIGHT: 131.8 LBS | DIASTOLIC BLOOD PRESSURE: 76 MMHG | SYSTOLIC BLOOD PRESSURE: 118 MMHG | BODY MASS INDEX: 19.97 KG/M2

## 2023-12-07 DIAGNOSIS — S06.9X9D TRAUMATIC BRAIN INJURY WITH LOSS OF CONSCIOUSNESS, SUBSEQUENT ENCOUNTER: ICD-10-CM

## 2023-12-07 DIAGNOSIS — Z13.29 THYROID DISORDER SCREENING: ICD-10-CM

## 2023-12-07 DIAGNOSIS — F41.1 GENERALIZED ANXIETY DISORDER: ICD-10-CM

## 2023-12-07 DIAGNOSIS — F90.0 ADHD (ATTENTION DEFICIT HYPERACTIVITY DISORDER), INATTENTIVE TYPE: Primary | ICD-10-CM

## 2023-12-07 DIAGNOSIS — F43.10 POST TRAUMATIC STRESS DISORDER (PTSD): ICD-10-CM

## 2023-12-07 DIAGNOSIS — F41.0 PANIC ATTACKS: ICD-10-CM

## 2023-12-07 DIAGNOSIS — F51.05 INSOMNIA DUE TO MENTAL CONDITION: ICD-10-CM

## 2023-12-07 DIAGNOSIS — Z00.00 ANNUAL PHYSICAL EXAM: Primary | ICD-10-CM

## 2023-12-07 DIAGNOSIS — G62.9 NEUROPATHY: ICD-10-CM

## 2023-12-07 DIAGNOSIS — F33.1 MAJOR DEPRESSIVE DISORDER, RECURRENT EPISODE, MODERATE: ICD-10-CM

## 2023-12-07 DIAGNOSIS — L30.1 DYSHIDROTIC ECZEMA: ICD-10-CM

## 2023-12-07 DIAGNOSIS — Z13.220 LIPID SCREENING: ICD-10-CM

## 2023-12-07 DIAGNOSIS — E55.9 VITAMIN D DEFICIENCY: ICD-10-CM

## 2023-12-07 DIAGNOSIS — Z23 NEED FOR INFLUENZA VACCINATION: ICD-10-CM

## 2023-12-07 DIAGNOSIS — M54.42 CHRONIC BILATERAL LOW BACK PAIN WITH LEFT-SIDED SCIATICA: ICD-10-CM

## 2023-12-07 DIAGNOSIS — G89.29 CHRONIC BILATERAL LOW BACK PAIN WITH LEFT-SIDED SCIATICA: ICD-10-CM

## 2023-12-07 DIAGNOSIS — M50.30 DDD (DEGENERATIVE DISC DISEASE), CERVICAL: ICD-10-CM

## 2023-12-07 LAB
ALBUMIN SERPL-MCNC: 4.8 G/DL (ref 3.5–5.2)
ALBUMIN/GLOB SERPL: 2.1 G/DL
ALP SERPL-CCNC: 67 U/L (ref 39–117)
ALT SERPL W P-5'-P-CCNC: 9 U/L (ref 1–33)
AMPHET+METHAMPHET UR QL: POSITIVE
AMPHETAMINE INTERNAL CONTROL: ABNORMAL
AMPHETAMINES UR QL: NEGATIVE
ANION GAP SERPL CALCULATED.3IONS-SCNC: 7 MMOL/L (ref 5–15)
AST SERPL-CCNC: 13 U/L (ref 1–32)
BARBITURATE INTERNAL CONTROL: ABNORMAL
BARBITURATES UR QL SCN: NEGATIVE
BASOPHILS # BLD AUTO: 0.07 10*3/MM3 (ref 0–0.2)
BASOPHILS NFR BLD AUTO: 1.1 % (ref 0–1.5)
BENZODIAZ UR QL SCN: NEGATIVE
BENZODIAZEPINE INTERNAL CONTROL: ABNORMAL
BILIRUB SERPL-MCNC: 0.2 MG/DL (ref 0–1.2)
BUN SERPL-MCNC: 14 MG/DL (ref 6–20)
BUN/CREAT SERPL: 17.9 (ref 7–25)
BUPRENORPHINE INTERNAL CONTROL: ABNORMAL
BUPRENORPHINE SERPL-MCNC: NEGATIVE NG/ML
CALCIUM SPEC-SCNC: 9.8 MG/DL (ref 8.6–10.5)
CANNABINOIDS SERPL QL: POSITIVE
CHLORIDE SERPL-SCNC: 103 MMOL/L (ref 98–107)
CHOLEST SERPL-MCNC: 222 MG/DL (ref 0–200)
CO2 SERPL-SCNC: 28 MMOL/L (ref 22–29)
COCAINE INTERNAL CONTROL: ABNORMAL
COCAINE UR QL: NEGATIVE
CREAT SERPL-MCNC: 0.78 MG/DL (ref 0.57–1)
DEPRECATED RDW RBC AUTO: 46.1 FL (ref 37–54)
EGFRCR SERPLBLD CKD-EPI 2021: 99.8 ML/MIN/1.73
EOSINOPHIL # BLD AUTO: 0.12 10*3/MM3 (ref 0–0.4)
EOSINOPHIL NFR BLD AUTO: 1.8 % (ref 0.3–6.2)
ERYTHROCYTE [DISTWIDTH] IN BLOOD BY AUTOMATED COUNT: 14.4 % (ref 12.3–15.4)
EXPIRATION DATE: ABNORMAL
GLOBULIN UR ELPH-MCNC: 2.3 GM/DL
GLUCOSE SERPL-MCNC: 90 MG/DL (ref 65–99)
HCT VFR BLD AUTO: 41.6 % (ref 34–46.6)
HDLC SERPL-MCNC: 50 MG/DL (ref 40–60)
HGB BLD-MCNC: 13.8 G/DL (ref 12–15.9)
IMM GRANULOCYTES # BLD AUTO: 0.01 10*3/MM3 (ref 0–0.05)
IMM GRANULOCYTES NFR BLD AUTO: 0.2 % (ref 0–0.5)
LDLC SERPL CALC-MCNC: 160 MG/DL (ref 0–100)
LDLC/HDLC SERPL: 3.16 {RATIO}
LYMPHOCYTES # BLD AUTO: 2.51 10*3/MM3 (ref 0.7–3.1)
LYMPHOCYTES NFR BLD AUTO: 38.4 % (ref 19.6–45.3)
Lab: ABNORMAL
MCH RBC QN AUTO: 29.2 PG (ref 26.6–33)
MCHC RBC AUTO-ENTMCNC: 33.2 G/DL (ref 31.5–35.7)
MCV RBC AUTO: 88.1 FL (ref 79–97)
MDMA (ECSTASY) INTERNAL CONTROL: ABNORMAL
MDMA UR QL SCN: NEGATIVE
METHADONE INTERNAL CONTROL: ABNORMAL
METHADONE UR QL SCN: NEGATIVE
METHAMPHETAMINE INTERNAL CONTROL: ABNORMAL
MONOCYTES # BLD AUTO: 0.9 10*3/MM3 (ref 0.1–0.9)
MONOCYTES NFR BLD AUTO: 13.8 % (ref 5–12)
NEUTROPHILS NFR BLD AUTO: 2.93 10*3/MM3 (ref 1.7–7)
NEUTROPHILS NFR BLD AUTO: 44.7 % (ref 42.7–76)
NRBC BLD AUTO-RTO: 0 /100 WBC (ref 0–0.2)
OPIATES INTERNAL CONTROL: ABNORMAL
OPIATES UR QL: NEGATIVE
OXYCODONE INTERNAL CONTROL: ABNORMAL
OXYCODONE UR QL SCN: NEGATIVE
PCP UR QL SCN: NEGATIVE
PHENCYCLIDINE INTERNAL CONTROL: ABNORMAL
PLATELET # BLD AUTO: 449 10*3/MM3 (ref 140–450)
PMV BLD AUTO: 9.8 FL (ref 6–12)
POTASSIUM SERPL-SCNC: 4.8 MMOL/L (ref 3.5–5.2)
PROT SERPL-MCNC: 7.1 G/DL (ref 6–8.5)
RBC # BLD AUTO: 4.72 10*6/MM3 (ref 3.77–5.28)
SODIUM SERPL-SCNC: 138 MMOL/L (ref 136–145)
THC INTERNAL CONTROL: ABNORMAL
TRIGL SERPL-MCNC: 70 MG/DL (ref 0–150)
TSH SERPL DL<=0.05 MIU/L-ACNC: 1.12 UIU/ML (ref 0.27–4.2)
VLDLC SERPL-MCNC: 12 MG/DL (ref 5–40)
WBC NRBC COR # BLD AUTO: 6.54 10*3/MM3 (ref 3.4–10.8)

## 2023-12-07 PROCEDURE — 80053 COMPREHEN METABOLIC PANEL: CPT | Performed by: NURSE PRACTITIONER

## 2023-12-07 PROCEDURE — 80061 LIPID PANEL: CPT | Performed by: NURSE PRACTITIONER

## 2023-12-07 PROCEDURE — 85025 COMPLETE CBC W/AUTO DIFF WBC: CPT | Performed by: NURSE PRACTITIONER

## 2023-12-07 PROCEDURE — 84443 ASSAY THYROID STIM HORMONE: CPT | Performed by: NURSE PRACTITIONER

## 2023-12-07 RX ORDER — DEXTROAMPHETAMINE SACCHARATE, AMPHETAMINE ASPARTATE MONOHYDRATE, DEXTROAMPHETAMINE SULFATE AND AMPHETAMINE SULFATE 6.25; 6.25; 6.25; 6.25 MG/1; MG/1; MG/1; MG/1
25 CAPSULE, EXTENDED RELEASE ORAL DAILY
Qty: 30 CAPSULE | Refills: 0 | Status: SHIPPED | OUTPATIENT
Start: 2023-12-08

## 2023-12-07 RX ORDER — RIMEGEPANT SULFATE 75 MG/75MG
1 TABLET, ORALLY DISINTEGRATING ORAL AS NEEDED
Qty: 6 TABLET | Refills: 0 | COMMUNITY
Start: 2023-12-07

## 2023-12-07 RX ORDER — GABAPENTIN 300 MG/1
300 CAPSULE ORAL 3 TIMES DAILY
Qty: 90 CAPSULE | Refills: 2 | Status: SHIPPED | OUTPATIENT
Start: 2023-12-07

## 2023-12-07 RX ORDER — DEXTROAMPHETAMINE SACCHARATE, AMPHETAMINE ASPARTATE, DEXTROAMPHETAMINE SULFATE AND AMPHETAMINE SULFATE 2.5; 2.5; 2.5; 2.5 MG/1; MG/1; MG/1; MG/1
10 TABLET ORAL DAILY
Qty: 30 TABLET | Refills: 0 | Status: SHIPPED | OUTPATIENT
Start: 2023-12-08 | End: 2023-12-11 | Stop reason: SDUPTHER

## 2023-12-07 RX ORDER — ERGOCALCIFEROL 1.25 MG/1
50000 CAPSULE ORAL WEEKLY
Qty: 13 CAPSULE | Refills: 1 | Status: SHIPPED | OUTPATIENT
Start: 2023-12-07

## 2023-12-07 RX ORDER — CYCLOBENZAPRINE HCL 10 MG
10 TABLET ORAL 2 TIMES DAILY PRN
Qty: 60 TABLET | Refills: 1 | Status: SHIPPED | OUTPATIENT
Start: 2023-12-07

## 2023-12-07 NOTE — PATIENT INSTRUCTIONS
1.  Please return to clinic at your next scheduled visit.  Contact the clinic ([phone]) at least 24 hours prior in the event you need to cancel.  2.  Do no harm to yourself or others.    3.  Avoid alcohol and drugs.    4.  Take all medications as prescribed.  Please contact the clinic with any concerns. If you are in need of medication refills, please call the clinic at [phone].    5. Should you want to get in touch with your provider, Dr. Sarita Schultz, please utilize [facility] or contact the office ([phone]), and staff will be able to page Dr. Schultz directly.  6.  In the event you have personal crisis, contact the following crisis numbers: Suicide Prevention Hotline 1-409.368.4657; [facility] Helpline 8-315-070-[place]; [facility] [phone]; text HELLO to 449018; or 650.
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
## 2023-12-11 DIAGNOSIS — F90.0 ADHD (ATTENTION DEFICIT HYPERACTIVITY DISORDER), INATTENTIVE TYPE: ICD-10-CM

## 2023-12-11 RX ORDER — DEXTROAMPHETAMINE SACCHARATE, AMPHETAMINE ASPARTATE, DEXTROAMPHETAMINE SULFATE AND AMPHETAMINE SULFATE 2.5; 2.5; 2.5; 2.5 MG/1; MG/1; MG/1; MG/1
10 TABLET ORAL DAILY
Qty: 30 TABLET | Refills: 0 | Status: SHIPPED | OUTPATIENT
Start: 2023-12-11 | End: 2023-12-12 | Stop reason: SDUPTHER

## 2023-12-11 NOTE — TELEPHONE ENCOUNTER
There's really not much more we can do except find a pharmacy that has it, or else will have it soon.

## 2023-12-11 NOTE — TELEPHONE ENCOUNTER
PT(PATIENT) VERIFIED     PT(PATIENT) IS REQUESTING FOR SCRIPT TO BE SENT TO [facility]     PT(PATIENT) STATES THEY DID CONFIRM THEY HAVE 10MG IN STOCK     PROVIDER PLEASE ADVISE

## 2023-12-11 NOTE — TELEPHONE ENCOUNTER
PT(PATIENT) VERIFIED     CONTACTED PT(PATIENT) PER PROVIDER'S INSTRUCTIONS     ADHD SHORTAGES     PATIENT ADVISED TO CONTACT LOCAL SMALLER PHARMACIES ([facility] AS [facility], [facility], [facility], [facility], [facility]) REGARDING MEDICATION DOSAGE SUPPLIES     PT(PATIENT) ADVISED OFFICE CAN RESEND MEDICATION TO REQUESTED PHARMACY WITH IN STOCK MEDICATION     PT(PATIENT) ADVISED THAT TYPICALLY PROVIDER DOES NOT SEND IN A DIFFERENT DOSAGE, AS INSURANCE MAY REQUIRE ANOTHER PRIOR AUTH REQUEST REGARDING DOSAGE CHANGES WHICH FURTHER DELAYS MEDICATION REFILLS     PT(PATIENT) VERBALIZED UNDERSTANDING AND HAD NO FURTHER QUESTIONS AT THIS TIME

## 2023-12-11 NOTE — PROGRESS NOTES
Called and spoke to Seda Mobley to relay results. Pt verbalized understanding. No further questions/concerns at this time.

## 2023-12-11 NOTE — TELEPHONE ENCOUNTER
PT(PATIENT) VERIFIED     amphetamine-dextroamphetamine (Adderall) 10 MG tablet (2023)     PT(PATIENT) STATES SHE HAS CALLED MULTIPLE PHARMACIES ([facility], [facility], [facility])  AND NONE HAVE 10MG IN STOCK     PT(PATIENT) STATES SHE HAS BEEN ADVISED BY MULTIPLE PHARMACIES THAT MEDICATION IS ON BACK ORDER AND THEY ARE UNSURE WHEN THEY WILL RECEIVE MORE IN STOCK     PT(PATIENT) STATES SHE WAS ABLE TO  THE amphetamine-dextroamphetamine XR (ADDERALL XR) 25 MG 24 hr capsule (2023)     NEXT APPT WITH PROVIDER  Appointment with Sarita Schultz MD (2024)     PROVIDER PLEASE ADVISE

## 2023-12-12 ENCOUNTER — HOSPITAL ENCOUNTER (OUTPATIENT)
Dept: MRI IMAGING | Facility: HOSPITAL | Age: 38
Discharge: HOME | End: 2023-12-12
Admitting: STUDENT IN AN ORGANIZED HEALTH CARE EDUCATION/TRAINING PROGRAM
Payer: OTHER GOVERNMENT

## 2023-12-12 DIAGNOSIS — M25.551 RIGHT HIP PAIN: ICD-10-CM

## 2023-12-12 PROCEDURE — 73721 MRI JNT OF LWR EXTRE W/O DYE: CPT

## 2023-12-12 RX ORDER — DEXTROAMPHETAMINE SACCHARATE, AMPHETAMINE ASPARTATE, DEXTROAMPHETAMINE SULFATE AND AMPHETAMINE SULFATE 2.5; 2.5; 2.5; 2.5 MG/1; MG/1; MG/1; MG/1
10 TABLET ORAL DAILY
Qty: 30 TABLET | Refills: 0 | Status: SHIPPED | OUTPATIENT
Start: 2023-12-12

## 2023-12-14 NOTE — TELEPHONE ENCOUNTER
PT(PATIENT) VERIFIED     CONTACTED PT(PATIENT) PER PROVIDER'S INSTRUCTIONS  amphetamine-dextroamphetamine (Adderall) 10 MG tablet (2023)      PT(PATIENT) VERBALIZED UNDERSTANDING AND HAD NO FURTHER QUESTIONS AT THIS TIME

## 2023-12-22 RX ORDER — MIDODRINE HYDROCHLORIDE 2.5 MG/1
2.5 TABLET ORAL DAILY
Qty: 90 TABLET | OUTPATIENT
Start: 2023-12-22

## 2024-01-11 ENCOUNTER — OFFICE VISIT (OUTPATIENT)
Dept: PSYCHIATRY | Facility: CLINIC | Age: 39
End: 2024-01-11
Payer: COMMERCIAL

## 2024-01-11 VITALS
HEIGHT: 68 IN | WEIGHT: 127.6 LBS | SYSTOLIC BLOOD PRESSURE: 115 MMHG | DIASTOLIC BLOOD PRESSURE: 78 MMHG | BODY MASS INDEX: 19.34 KG/M2 | HEART RATE: 118 BPM

## 2024-01-11 DIAGNOSIS — F43.10 POST TRAUMATIC STRESS DISORDER (PTSD): ICD-10-CM

## 2024-01-11 DIAGNOSIS — F33.1 MAJOR DEPRESSIVE DISORDER, RECURRENT EPISODE, MODERATE: ICD-10-CM

## 2024-01-11 DIAGNOSIS — S06.9X9D TRAUMATIC BRAIN INJURY WITH LOSS OF CONSCIOUSNESS, SUBSEQUENT ENCOUNTER: ICD-10-CM

## 2024-01-11 DIAGNOSIS — F51.05 INSOMNIA DUE TO MENTAL CONDITION: ICD-10-CM

## 2024-01-11 DIAGNOSIS — F41.1 GENERALIZED ANXIETY DISORDER: ICD-10-CM

## 2024-01-11 DIAGNOSIS — F90.0 ADHD (ATTENTION DEFICIT HYPERACTIVITY DISORDER), INATTENTIVE TYPE: Primary | ICD-10-CM

## 2024-01-11 DIAGNOSIS — F41.0 PANIC ATTACKS: ICD-10-CM

## 2024-01-11 RX ORDER — DEXTROAMPHETAMINE SACCHARATE, AMPHETAMINE ASPARTATE MONOHYDRATE, DEXTROAMPHETAMINE SULFATE AND AMPHETAMINE SULFATE 6.25; 6.25; 6.25; 6.25 MG/1; MG/1; MG/1; MG/1
25 CAPSULE, EXTENDED RELEASE ORAL DAILY
Qty: 30 CAPSULE | Refills: 0 | Status: SHIPPED | OUTPATIENT
Start: 2024-01-11

## 2024-01-11 RX ORDER — DEXTROAMPHETAMINE SACCHARATE, AMPHETAMINE ASPARTATE, DEXTROAMPHETAMINE SULFATE AND AMPHETAMINE SULFATE 2.5; 2.5; 2.5; 2.5 MG/1; MG/1; MG/1; MG/1
10 TABLET ORAL DAILY
Qty: 22 TABLET | Refills: 0 | Status: SHIPPED | OUTPATIENT
Start: 2024-01-19

## 2024-01-11 NOTE — PATIENT INSTRUCTIONS
1.  Please return to clinic at your next scheduled visit.  Contact the clinic ([phone]) at least 24 hours prior in the event you need to cancel.  2.  Do no harm to yourself or others.    3.  Avoid alcohol and drugs.    4.  Take all medications as prescribed.  Please contact the clinic with any concerns. If you are in need of medication refills, please call the clinic at [phone].    5. Should you want to get in touch with your provider, Dr. Sarita Schultz, please utilize [employer] or contact the office ([phone]), and staff will be able to page Dr. Schultz directly.  6.  In the event you have personal crisis, contact the following crisis numbers: Suicide Prevention Hotline 1-584.785.8769; LEONCIO Helpline 6-936-503-LEONCIO; [facility] [phone]; text HELLO to [phone]; or 105.
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
## 2024-02-08 ENCOUNTER — OFFICE VISIT (OUTPATIENT)
Dept: PSYCHIATRY | Facility: CLINIC | Age: 39
End: 2024-02-08
Payer: COMMERCIAL

## 2024-02-08 VITALS
SYSTOLIC BLOOD PRESSURE: 111 MMHG | HEART RATE: 91 BPM | BODY MASS INDEX: 19.52 KG/M2 | WEIGHT: 128.8 LBS | DIASTOLIC BLOOD PRESSURE: 66 MMHG | HEIGHT: 68 IN

## 2024-02-08 DIAGNOSIS — F33.1 MAJOR DEPRESSIVE DISORDER, RECURRENT EPISODE, MODERATE: ICD-10-CM

## 2024-02-08 DIAGNOSIS — F51.05 INSOMNIA DUE TO MENTAL CONDITION: ICD-10-CM

## 2024-02-08 DIAGNOSIS — F41.1 GENERALIZED ANXIETY DISORDER: ICD-10-CM

## 2024-02-08 DIAGNOSIS — F43.10 POST TRAUMATIC STRESS DISORDER (PTSD): ICD-10-CM

## 2024-02-08 DIAGNOSIS — F90.0 ADHD (ATTENTION DEFICIT HYPERACTIVITY DISORDER), INATTENTIVE TYPE: Primary | ICD-10-CM

## 2024-02-08 DIAGNOSIS — F41.0 PANIC ATTACKS: ICD-10-CM

## 2024-02-08 DIAGNOSIS — S06.9X9D TRAUMATIC BRAIN INJURY WITH LOSS OF CONSCIOUSNESS, SUBSEQUENT ENCOUNTER: ICD-10-CM

## 2024-02-08 RX ORDER — DEXTROAMPHETAMINE SACCHARATE, AMPHETAMINE ASPARTATE, DEXTROAMPHETAMINE SULFATE AND AMPHETAMINE SULFATE 2.5; 2.5; 2.5; 2.5 MG/1; MG/1; MG/1; MG/1
10 TABLET ORAL DAILY
Qty: 30 TABLET | Refills: 0 | Status: SHIPPED | OUTPATIENT
Start: 2024-02-26

## 2024-02-08 RX ORDER — DEXTROAMPHETAMINE SACCHARATE, AMPHETAMINE ASPARTATE MONOHYDRATE, DEXTROAMPHETAMINE SULFATE AND AMPHETAMINE SULFATE 6.25; 6.25; 6.25; 6.25 MG/1; MG/1; MG/1; MG/1
25 CAPSULE, EXTENDED RELEASE ORAL DAILY
Qty: 30 CAPSULE | Refills: 0 | Status: SHIPPED | OUTPATIENT
Start: 2024-02-10
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
## 2024-02-08 NOTE — PATIENT INSTRUCTIONS
1.  Please return to clinic at your next scheduled visit.  Contact the clinic ([phone]) at least 24 hours prior in the event you need to cancel.  2.  Do no harm to yourself or others.    3.  Avoid alcohol and drugs.    4.  Take all medications as prescribed.  Please contact the clinic with any concerns. If you are in need of medication refills, please call the clinic at [phone].    5. Should you want to get in touch with your provider, Dr. Sarita Schultz, please utilize [employer] or contact the office ([phone]), and staff will be able to page Dr. Schultz directly.  6.  In the event you have personal crisis, contact the following crisis numbers: Suicide Prevention Hotline 1-899.616.7634; [facility] Helpline 7-141-435-[place]; [facility] Emergency Room [phone]; text HELLO to 745015; or 200.

## 2024-02-14 DIAGNOSIS — G89.29 CHRONIC BILATERAL LOW BACK PAIN WITH LEFT-SIDED SCIATICA: ICD-10-CM

## 2024-02-14 DIAGNOSIS — M54.42 CHRONIC BILATERAL LOW BACK PAIN WITH LEFT-SIDED SCIATICA: ICD-10-CM

## 2024-02-15 RX ORDER — MIDODRINE HYDROCHLORIDE 2.5 MG/1
2.5 TABLET ORAL DAILY
Qty: 30 TABLET | Refills: 2 | Status: SHIPPED | OUTPATIENT
Start: 2024-02-15

## 2024-02-15 RX ORDER — CYCLOBENZAPRINE HCL 10 MG
10 TABLET ORAL 2 TIMES DAILY PRN
Qty: 60 TABLET | Refills: 1 | Status: SHIPPED | OUTPATIENT
Start: 2024-02-15

## 2024-02-26 ENCOUNTER — TELEPHONE (OUTPATIENT)
Dept: PSYCHIATRY | Facility: CLINIC | Age: 39
End: 2024-02-26
Payer: COMMERCIAL

## 2024-02-26 NOTE — TELEPHONE ENCOUNTER
[place] APPROV[place] RECEIVED FOR DEXTROAMP-AMPHETAMIN 10 MG TABLETS.    APPROVAL DATES ARE: 02/26/2024-12/31/2024.  BEHAVIORAL HEALTH - SCAN - [place] APPROVAL BHARAT NINO; 02/26/2024. (02/26/2024)     PT VERBALLY INFORMED OF APPROVAL VIA TELEPHONE.

## 2024-02-29 ENCOUNTER — APPOINTMENT (OUTPATIENT)
Dept: ULTRASOUND IMAGING | Facility: HOSPITAL | Age: 39
End: 2024-02-29
Payer: OTHER GOVERNMENT

## 2024-02-29 ENCOUNTER — HOSPITAL ENCOUNTER (EMERGENCY)
Facility: HOSPITAL | Age: 39
Discharge: HOME | End: 2024-02-29
Attending: EMERGENCY MEDICINE | Admitting: EMERGENCY MEDICINE
Payer: OTHER GOVERNMENT

## 2024-02-29 ENCOUNTER — APPOINTMENT (OUTPATIENT)
Dept: CT IMAGING | Facility: HOSPITAL | Age: 39
End: 2024-02-29
Payer: OTHER GOVERNMENT

## 2024-02-29 VITALS
HEART RATE: 81 BPM | OXYGEN SATURATION: 96 % | DIASTOLIC BLOOD PRESSURE: 55 MMHG | TEMPERATURE: 97.6 F | BODY MASS INDEX: 20.75 KG/M2 | HEIGHT: 68 IN | WEIGHT: 136.91 LBS | RESPIRATION RATE: 18 BRPM | SYSTOLIC BLOOD PRESSURE: 94 MMHG

## 2024-02-29 DIAGNOSIS — N83.209 CYST OF OVARY, UNSPECIFIED LATERALITY: Primary | ICD-10-CM

## 2024-02-29 LAB
ALBUMIN SERPL-MCNC: 4.2 G/DL (ref 3.5–5.2)
ALBUMIN/GLOB SERPL: 1.6 G/DL
ALP SERPL-CCNC: 69 U/L (ref 39–117)
ALT SERPL W P-5'-P-CCNC: 6 U/L (ref 1–33)
ANION GAP SERPL CALCULATED.3IONS-SCNC: 7.3 MMOL/L (ref 5–15)
AST SERPL-CCNC: 10 U/L (ref 1–32)
BASOPHILS # BLD AUTO: 0.06 10*3/MM3 (ref 0–0.2)
BASOPHILS NFR BLD AUTO: 0.7 % (ref 0–1.5)
BILIRUB SERPL-MCNC: 0.2 MG/DL (ref 0–1.2)
BILIRUB UR QL STRIP: NEGATIVE
BUN SERPL-MCNC: 13 MG/DL (ref 6–20)
BUN/CREAT SERPL: 19.7 (ref 7–25)
CALCIUM SPEC-SCNC: 9 MG/DL (ref 8.6–10.5)
CHLORIDE SERPL-SCNC: 105 MMOL/L (ref 98–107)
CLARITY UR: CLEAR
CO2 SERPL-SCNC: 27.7 MMOL/L (ref 22–29)
COLOR UR: YELLOW
CREAT SERPL-MCNC: 0.66 MG/DL (ref 0.57–1)
DEPRECATED RDW RBC AUTO: 50.1 FL (ref 37–54)
EGFRCR SERPLBLD CKD-EPI 2021: 115.3 ML/MIN/1.73
EOSINOPHIL # BLD AUTO: 0.07 10*3/MM3 (ref 0–0.4)
EOSINOPHIL NFR BLD AUTO: 0.9 % (ref 0.3–6.2)
ERYTHROCYTE [DISTWIDTH] IN BLOOD BY AUTOMATED COUNT: 15.2 % (ref 12.3–15.4)
GLOBULIN UR ELPH-MCNC: 2.6 GM/DL
GLUCOSE SERPL-MCNC: 99 MG/DL (ref 65–99)
GLUCOSE UR STRIP-MCNC: NEGATIVE MG/DL
HCG INTACT+B SERPL-ACNC: <0.5 MIU/ML
HCT VFR BLD AUTO: 40.2 % (ref 34–46.6)
HGB BLD-MCNC: 13.2 G/DL (ref 12–15.9)
HGB UR QL STRIP.AUTO: NEGATIVE
HOLD SPECIMEN: NORMAL
HOLD SPECIMEN: NORMAL
IMM GRANULOCYTES # BLD AUTO: 0.02 10*3/MM3 (ref 0–0.05)
IMM GRANULOCYTES NFR BLD AUTO: 0.2 % (ref 0–0.5)
KETONES UR QL STRIP: ABNORMAL
LEUKOCYTE ESTERASE UR QL STRIP.AUTO: NEGATIVE
LIPASE SERPL-CCNC: 30 U/L (ref 13–60)
LYMPHOCYTES # BLD AUTO: 2.54 10*3/MM3 (ref 0.7–3.1)
LYMPHOCYTES NFR BLD AUTO: 31.1 % (ref 19.6–45.3)
MCH RBC QN AUTO: 29.3 PG (ref 26.6–33)
MCHC RBC AUTO-ENTMCNC: 32.8 G/DL (ref 31.5–35.7)
MCV RBC AUTO: 89.1 FL (ref 79–97)
MONOCYTES # BLD AUTO: 1.03 10*3/MM3 (ref 0.1–0.9)
MONOCYTES NFR BLD AUTO: 12.6 % (ref 5–12)
NEUTROPHILS NFR BLD AUTO: 4.45 10*3/MM3 (ref 1.7–7)
NEUTROPHILS NFR BLD AUTO: 54.5 % (ref 42.7–76)
NITRITE UR QL STRIP: NEGATIVE
NRBC BLD AUTO-RTO: 0 /100 WBC (ref 0–0.2)
PH UR STRIP.AUTO: 8 [PH] (ref 5–8)
PLATELET # BLD AUTO: 372 10*3/MM3 (ref 140–450)
PMV BLD AUTO: 8.9 FL (ref 6–12)
POTASSIUM SERPL-SCNC: 4.1 MMOL/L (ref 3.5–5.2)
PROT SERPL-MCNC: 6.8 G/DL (ref 6–8.5)
PROT UR QL STRIP: NEGATIVE
RBC # BLD AUTO: 4.51 10*6/MM3 (ref 3.77–5.28)
SODIUM SERPL-SCNC: 140 MMOL/L (ref 136–145)
SP GR UR STRIP: >1.03 (ref 1–1.03)
UROBILINOGEN UR QL STRIP: ABNORMAL
WBC NRBC COR # BLD AUTO: 8.17 10*3/MM3 (ref 3.4–10.8)
WHOLE BLOOD HOLD COAG: NORMAL
WHOLE BLOOD HOLD SPECIMEN: NORMAL

## 2024-02-29 PROCEDURE — 76856 US EXAM PELVIC COMPLETE: CPT

## 2024-02-29 PROCEDURE — 96374 THER/PROPH/DIAG INJ IV PUSH: CPT

## 2024-02-29 PROCEDURE — 81003 URINALYSIS AUTO W/O SCOPE: CPT | Performed by: EMERGENCY MEDICINE

## 2024-02-29 PROCEDURE — 25010000002 MORPHINE PER 10 MG: Performed by: EMERGENCY MEDICINE

## 2024-02-29 PROCEDURE — 83690 ASSAY OF LIPASE: CPT | Performed by: EMERGENCY MEDICINE

## 2024-02-29 PROCEDURE — 80053 COMPREHEN METABOLIC PANEL: CPT | Performed by: EMERGENCY MEDICINE

## 2024-02-29 PROCEDURE — 25510000001 IOPAMIDOL PER 1 ML: Performed by: EMERGENCY MEDICINE

## 2024-02-29 PROCEDURE — 99285 EMERGENCY DEPT VISIT HI MDM: CPT

## 2024-02-29 PROCEDURE — 96361 HYDRATE IV INFUSION ADD-ON: CPT

## 2024-02-29 PROCEDURE — 25810000003 SODIUM CHLORIDE 0.9 % SOLUTION: Performed by: EMERGENCY MEDICINE

## 2024-02-29 PROCEDURE — 84702 CHORIONIC GONADOTROPIN TEST: CPT | Performed by: EMERGENCY MEDICINE

## 2024-02-29 PROCEDURE — 85025 COMPLETE CBC W/AUTO DIFF WBC: CPT | Performed by: EMERGENCY MEDICINE

## 2024-02-29 PROCEDURE — 93975 VASCULAR STUDY: CPT

## 2024-02-29 PROCEDURE — 74177 CT ABD & PELVIS W/CONTRAST: CPT

## 2024-02-29 RX ORDER — MORPHINE SULFATE 2 MG/ML
2 INJECTION, SOLUTION INTRAMUSCULAR; INTRAVENOUS ONCE
Status: COMPLETED | OUTPATIENT
Start: 2024-02-29 | End: 2024-02-29

## 2024-02-29 RX ORDER — OXYCODONE HYDROCHLORIDE AND ACETAMINOPHEN 5; 325 MG/1; MG/1
1 TABLET ORAL EVERY 6 HOURS PRN
Qty: 12 TABLET | Refills: 0 | Status: SHIPPED | OUTPATIENT
Start: 2024-02-29

## 2024-02-29 RX ORDER — IBUPROFEN 800 MG/1
800 TABLET ORAL EVERY 6 HOURS PRN
Qty: 10 TABLET | Refills: 0 | Status: SHIPPED | OUTPATIENT
Start: 2024-02-29

## 2024-02-29 RX ORDER — SODIUM CHLORIDE 0.9 % (FLUSH) 0.9 %
10 SYRINGE (ML) INJECTION AS NEEDED
Status: DISCONTINUED | OUTPATIENT
Start: 2024-02-29 | End: 2024-02-29 | Stop reason: HOSPADM

## 2024-02-29 RX ADMIN — IOPAMIDOL 100 ML: 755 INJECTION, SOLUTION INTRAVENOUS at 12:57

## 2024-02-29 RX ADMIN — SODIUM CHLORIDE 1000 ML: 9 INJECTION, SOLUTION INTRAVENOUS at 12:15

## 2024-02-29 RX ADMIN — MORPHINE SULFATE 2 MG: 2 INJECTION, SOLUTION INTRAMUSCULAR; INTRAVENOUS at 12:15
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
## 2024-03-08 NOTE — PATIENT INSTRUCTIONS
1.  Please return to clinic at your next scheduled visit.  Contact the clinic ([phone]) at least 24 hours prior in the event you need to cancel.  2.  Do no harm to yourself or others.    3.  Avoid alcohol and drugs.    4.  Take all medications as prescribed.  Please contact the clinic with any concerns. If you are in need of medication refills, please call the clinic at [phone].    5. Should you want to get in touch with your provider, Dr. Sarita Schultz, please utilize [employer] or contact the office ([phone]), and staff will be able to page Dr. Schultz directly.  6.  In the event you have personal crisis, contact the following crisis numbers: Suicide Prevention Hotline 1-277.431.7410; [facility] Helpline 4-033-063-[place]ON[place]; [facility] Emergency Room [phone]; text HELLO to 100440; or 768.

## 2024-03-11 ENCOUNTER — OFFICE VISIT (OUTPATIENT)
Dept: PSYCHIATRY | Facility: CLINIC | Age: 39
End: 2024-03-11
Payer: MEDICARE

## 2024-03-11 VITALS
BODY MASS INDEX: 18.7 KG/M2 | HEIGHT: 68 IN | SYSTOLIC BLOOD PRESSURE: 114 MMHG | DIASTOLIC BLOOD PRESSURE: 68 MMHG | HEART RATE: 83 BPM | WEIGHT: 123.4 LBS

## 2024-03-11 DIAGNOSIS — S06.9X9D TRAUMATIC BRAIN INJURY WITH LOSS OF CONSCIOUSNESS, SUBSEQUENT ENCOUNTER: ICD-10-CM

## 2024-03-11 DIAGNOSIS — F41.0 PANIC ATTACKS: ICD-10-CM

## 2024-03-11 DIAGNOSIS — F41.1 GENERALIZED ANXIETY DISORDER: ICD-10-CM

## 2024-03-11 DIAGNOSIS — F43.10 POST TRAUMATIC STRESS DISORDER (PTSD): ICD-10-CM

## 2024-03-11 DIAGNOSIS — F51.05 INSOMNIA DUE TO MENTAL CONDITION: ICD-10-CM

## 2024-03-11 DIAGNOSIS — F33.1 MAJOR DEPRESSIVE DISORDER, RECURRENT EPISODE, MODERATE: ICD-10-CM

## 2024-03-11 DIAGNOSIS — F90.0 ADHD (ATTENTION DEFICIT HYPERACTIVITY DISORDER), INATTENTIVE TYPE: Primary | ICD-10-CM

## 2024-03-11 RX ORDER — DEXTROAMPHETAMINE SACCHARATE, AMPHETAMINE ASPARTATE MONOHYDRATE, DEXTROAMPHETAMINE SULFATE AND AMPHETAMINE SULFATE 6.25; 6.25; 6.25; 6.25 MG/1; MG/1; MG/1; MG/1
25 CAPSULE, EXTENDED RELEASE ORAL DAILY
Qty: 30 CAPSULE | Refills: 0 | Status: SHIPPED | OUTPATIENT
Start: 2024-03-11

## 2024-03-11 RX ORDER — DEXTROAMPHETAMINE SACCHARATE, AMPHETAMINE ASPARTATE, DEXTROAMPHETAMINE SULFATE AND AMPHETAMINE SULFATE 2.5; 2.5; 2.5; 2.5 MG/1; MG/1; MG/1; MG/1
10 TABLET ORAL DAILY
Qty: 30 TABLET | Refills: 0 | Status: SHIPPED | OUTPATIENT
Start: 2024-03-27
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
## 2024-04-10 NOTE — PATIENT INSTRUCTIONS
1.  Please return to clinic at your next scheduled visit.  Contact the clinic ([phone]) at least 24 hours prior in the event you need to cancel.  2.  Do no harm to yourself or others.    3.  Avoid alcohol and drugs.    4.  Take all medications as prescribed.  Please contact the clinic with any concerns. If you are in need of medication refills, please call the clinic at [phone].    5. Should you want to get in touch with your provider, Dr. Sarita Schultz, please utilize [employer] or contact the office ([phone]), and staff will be able to page Dr. Schultz directly.  6.  In the event you have personal crisis, contact the following crisis numbers: Suicide Prevention Hotline 1-979.491.2355; [facility] Helpline 8-575-294-LEONCIO; [facility] Emergency Room [phone]; text HELLO to 329755; or 397.

## 2024-04-11 ENCOUNTER — OFFICE VISIT (OUTPATIENT)
Dept: PSYCHIATRY | Facility: CLINIC | Age: 39
End: 2024-04-11
Payer: MEDICARE

## 2024-04-11 VITALS
DIASTOLIC BLOOD PRESSURE: 77 MMHG | BODY MASS INDEX: 18.64 KG/M2 | HEART RATE: 85 BPM | SYSTOLIC BLOOD PRESSURE: 123 MMHG | HEIGHT: 68 IN | WEIGHT: 123 LBS

## 2024-04-11 DIAGNOSIS — F90.0 ADHD (ATTENTION DEFICIT HYPERACTIVITY DISORDER), INATTENTIVE TYPE: Primary | ICD-10-CM

## 2024-04-11 DIAGNOSIS — F43.10 POST TRAUMATIC STRESS DISORDER (PTSD): ICD-10-CM

## 2024-04-11 DIAGNOSIS — F51.05 INSOMNIA DUE TO MENTAL CONDITION: ICD-10-CM

## 2024-04-11 DIAGNOSIS — F41.1 GENERALIZED ANXIETY DISORDER: ICD-10-CM

## 2024-04-11 DIAGNOSIS — F41.0 PANIC ATTACKS: ICD-10-CM

## 2024-04-11 DIAGNOSIS — S06.9X9D TRAUMATIC BRAIN INJURY WITH LOSS OF CONSCIOUSNESS, SUBSEQUENT ENCOUNTER: ICD-10-CM

## 2024-04-11 DIAGNOSIS — F33.1 MAJOR DEPRESSIVE DISORDER, RECURRENT EPISODE, MODERATE: ICD-10-CM

## 2024-04-11 RX ORDER — DEXTROAMPHETAMINE SACCHARATE, AMPHETAMINE ASPARTATE, DEXTROAMPHETAMINE SULFATE AND AMPHETAMINE SULFATE 2.5; 2.5; 2.5; 2.5 MG/1; MG/1; MG/1; MG/1
10 TABLET ORAL DAILY
Qty: 30 TABLET | Refills: 0 | Status: SHIPPED | OUTPATIENT
Start: 2024-04-26

## 2024-04-11 RX ORDER — DEXTROAMPHETAMINE SACCHARATE, AMPHETAMINE ASPARTATE MONOHYDRATE, DEXTROAMPHETAMINE SULFATE AND AMPHETAMINE SULFATE 6.25; 6.25; 6.25; 6.25 MG/1; MG/1; MG/1; MG/1
25 CAPSULE, EXTENDED RELEASE ORAL DAILY
Qty: 30 CAPSULE | Refills: 0 | Status: SHIPPED | OUTPATIENT
Start: 2024-04-11

## 2024-04-15 DIAGNOSIS — M54.42 CHRONIC BILATERAL LOW BACK PAIN WITH LEFT-SIDED SCIATICA: ICD-10-CM

## 2024-04-15 DIAGNOSIS — G89.29 CHRONIC BILATERAL LOW BACK PAIN WITH LEFT-SIDED SCIATICA: ICD-10-CM

## 2024-04-15 RX ORDER — CYCLOBENZAPRINE HCL 10 MG
10 TABLET ORAL 2 TIMES DAILY PRN
Qty: 60 TABLET | Refills: 0 | Status: SHIPPED | OUTPATIENT
Start: 2024-04-15

## 2024-04-29 RX ORDER — METOPROLOL SUCCINATE 25 MG/1
25 TABLET, EXTENDED RELEASE ORAL 2 TIMES DAILY
Qty: 180 TABLET | Refills: 3 | Status: SHIPPED | OUTPATIENT
Start: 2024-04-29

## 2024-05-02 ENCOUNTER — TELEPHONE (OUTPATIENT)
Dept: FAMILY MEDICINE CLINIC | Facility: CLINIC | Age: 39
End: 2024-05-02

## 2024-06-21 DIAGNOSIS — Z79.899 OTHER LONG TERM (CURRENT) DRUG THERAPY: ICD-10-CM

## 2024-06-21 DIAGNOSIS — F90.0 ADHD (ATTENTION DEFICIT HYPERACTIVITY DISORDER), INATTENTIVE TYPE: Primary | ICD-10-CM

## 2024-06-21 RX ORDER — DEXTROAMPHETAMINE SACCHARATE, AMPHETAMINE ASPARTATE, DEXTROAMPHETAMINE SULFATE AND AMPHETAMINE SULFATE 2.5; 2.5; 2.5; 2.5 MG/1; MG/1; MG/1; MG/1
10 TABLET ORAL DAILY
Qty: 30 TABLET | Refills: 0 | OUTPATIENT
Start: 2024-06-21

## 2024-06-21 RX ORDER — DEXTROAMPHETAMINE SACCHARATE, AMPHETAMINE ASPARTATE MONOHYDRATE, DEXTROAMPHETAMINE SULFATE AND AMPHETAMINE SULFATE 6.25; 6.25; 6.25; 6.25 MG/1; MG/1; MG/1; MG/1
25 CAPSULE, EXTENDED RELEASE ORAL DAILY
Qty: 30 CAPSULE | Refills: 0 | OUTPATIENT
Start: 2024-06-21

## 2024-06-21 NOTE — TELEPHONE ENCOUNTER
PT REQUESTING REFILL ON ADDERALL XR 25 MG CAPSULES AND ADDERALL 10 MG TABLETS.    amphetamine-dextroamphetamine XR (ADDERALL XR) 25 MG 24 hr capsule (04/11/2024)     amphetamine-dextroamphetamine (Adderall) 10 MG tablet (04/26/2024)     FOLLOW UP APPT ON 07/17/2024.  PT LAST SEEN ON 04/11/2024.    PT CANCELED APPT ON 05/16/2024.    PT LATE FOR CHECK IN FOR APPT ON 05/24/2024.    PT CANCELED APPT ON 06/11/2024 REQUESTING AN IN PERSON APPT.    LAST UDS RESULTS:  POC Urine Drug Screen [facility] (12/07/2023 10:31)

## 2024-06-28 ENCOUNTER — OFFICE VISIT (OUTPATIENT)
Dept: FAMILY MEDICINE CLINIC | Facility: CLINIC | Age: 39
End: 2024-06-28
Payer: MEDICARE

## 2024-06-28 VITALS
TEMPERATURE: 98.2 F | HEART RATE: 112 BPM | OXYGEN SATURATION: 98 % | SYSTOLIC BLOOD PRESSURE: 112 MMHG | DIASTOLIC BLOOD PRESSURE: 86 MMHG | BODY MASS INDEX: 17.58 KG/M2 | WEIGHT: 116 LBS | HEIGHT: 68 IN

## 2024-06-28 DIAGNOSIS — L91.0 HYPERTROPHIC SCAR: ICD-10-CM

## 2024-06-28 DIAGNOSIS — R00.2 HEART PALPITATIONS: ICD-10-CM

## 2024-06-28 DIAGNOSIS — M25.551 CHRONIC HIP PAIN, BILATERAL: ICD-10-CM

## 2024-06-28 DIAGNOSIS — M25.50 HYPERMOBILITY ARTHRALGIA: ICD-10-CM

## 2024-06-28 DIAGNOSIS — M25.552 CHRONIC HIP PAIN, BILATERAL: ICD-10-CM

## 2024-06-28 DIAGNOSIS — R52 PAINFUL CUTANEOUS SCAR: ICD-10-CM

## 2024-06-28 DIAGNOSIS — M26.609 TMJ (TEMPOROMANDIBULAR JOINT DISORDER): ICD-10-CM

## 2024-06-28 DIAGNOSIS — G89.29 CHRONIC HIP PAIN, BILATERAL: ICD-10-CM

## 2024-06-28 DIAGNOSIS — T85.42XA MALPOSITION OF BREAST IMPLANT, INITIAL ENCOUNTER: ICD-10-CM

## 2024-06-28 DIAGNOSIS — L90.5 PAINFUL CUTANEOUS SCAR: ICD-10-CM

## 2024-06-28 DIAGNOSIS — G90.A POTS (POSTURAL ORTHOSTATIC TACHYCARDIA SYNDROME): Primary | ICD-10-CM

## 2024-06-28 PROCEDURE — 1160F RVW MEDS BY RX/DR IN RCRD: CPT | Performed by: NURSE PRACTITIONER

## 2024-06-28 PROCEDURE — 1126F AMNT PAIN NOTED NONE PRSNT: CPT | Performed by: NURSE PRACTITIONER

## 2024-06-28 PROCEDURE — 99214 OFFICE O/P EST MOD 30 MIN: CPT | Performed by: NURSE PRACTITIONER

## 2024-06-28 PROCEDURE — 1159F MED LIST DOCD IN RCRD: CPT | Performed by: NURSE PRACTITIONER
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
## 2024-07-17 ENCOUNTER — OFFICE VISIT (OUTPATIENT)
Dept: PSYCHIATRY | Facility: CLINIC | Age: 39
End: 2024-07-17
Payer: MEDICARE

## 2024-07-17 ENCOUNTER — OFFICE VISIT (OUTPATIENT)
Dept: ORTHOPEDIC SURGERY | Facility: CLINIC | Age: 39
End: 2024-07-17
Payer: MEDICARE

## 2024-07-17 VITALS
HEART RATE: 83 BPM | HEIGHT: 68 IN | DIASTOLIC BLOOD PRESSURE: 72 MMHG | SYSTOLIC BLOOD PRESSURE: 111 MMHG | OXYGEN SATURATION: 98 % | BODY MASS INDEX: 17.58 KG/M2 | WEIGHT: 116 LBS

## 2024-07-17 VITALS
DIASTOLIC BLOOD PRESSURE: 69 MMHG | BODY MASS INDEX: 18.94 KG/M2 | HEART RATE: 69 BPM | WEIGHT: 125 LBS | SYSTOLIC BLOOD PRESSURE: 111 MMHG | HEIGHT: 68 IN

## 2024-07-17 DIAGNOSIS — F41.1 GENERALIZED ANXIETY DISORDER: ICD-10-CM

## 2024-07-17 DIAGNOSIS — F33.1 MAJOR DEPRESSIVE DISORDER, RECURRENT EPISODE, MODERATE: ICD-10-CM

## 2024-07-17 DIAGNOSIS — F51.05 INSOMNIA DUE TO MENTAL CONDITION: ICD-10-CM

## 2024-07-17 DIAGNOSIS — F41.0 PANIC ATTACKS: ICD-10-CM

## 2024-07-17 DIAGNOSIS — F43.10 POST TRAUMATIC STRESS DISORDER (PTSD): ICD-10-CM

## 2024-07-17 DIAGNOSIS — S06.9X9D TRAUMATIC BRAIN INJURY WITH LOSS OF CONSCIOUSNESS, SUBSEQUENT ENCOUNTER: ICD-10-CM

## 2024-07-17 DIAGNOSIS — F90.0 ADHD (ATTENTION DEFICIT HYPERACTIVITY DISORDER), INATTENTIVE TYPE: Primary | ICD-10-CM

## 2024-07-17 DIAGNOSIS — Z87.81 HISTORY OF PELVIC FRACTURE: ICD-10-CM

## 2024-07-17 DIAGNOSIS — M53.3 SI (SACROILIAC) PAIN: Primary | ICD-10-CM

## 2024-07-17 RX ORDER — DEXTROAMPHETAMINE SACCHARATE, AMPHETAMINE ASPARTATE MONOHYDRATE, DEXTROAMPHETAMINE SULFATE AND AMPHETAMINE SULFATE 6.25; 6.25; 6.25; 6.25 MG/1; MG/1; MG/1; MG/1
25 CAPSULE, EXTENDED RELEASE ORAL DAILY
Qty: 30 CAPSULE | Refills: 0 | Status: SHIPPED | OUTPATIENT
Start: 2024-07-17

## 2024-07-17 RX ORDER — DEXTROAMPHETAMINE SACCHARATE, AMPHETAMINE ASPARTATE, DEXTROAMPHETAMINE SULFATE AND AMPHETAMINE SULFATE 2.5; 2.5; 2.5; 2.5 MG/1; MG/1; MG/1; MG/1
10 TABLET ORAL DAILY
Qty: 30 TABLET | Refills: 0 | Status: SHIPPED | OUTPATIENT
Start: 2024-07-17
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
## 2024-07-17 NOTE — PROGRESS NOTES
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 Start booster dose.     10/13: Improving, irritability has improved, but residual ADHD. Realizing she can take a step back from the irritability.    9/1: Improving, but still distractible and tangential today. Also worrying about other people as a replacement of the void of worrying about getting her kids. Increase adderall XR.    8/4: Affect improving. Adderall helping ADHD. Increase dose.     7/7: Patient has gone too long with untreated ADHD.  No seizures in the past.  Start a stimulant.  Close follow-up.  CSA signed, need urine drug screen.    5/23: Stopped depakote on her own. Tolerating strattera. Start vraylar for mood stability (#14 samples).     3/27: Still a great deal of resentment toward the father of her kids. Explored today. No changes as she has improved. Significant ADHD remains, however. Trial of strattera.     2/10: Stopped qelbree (HA). Start guanfacine for anx, adhd. Add cymbalta next visit (pain, dep, anx). Situational stressors: ex-, her mom is sick.     11/8: Increase qelbree to target adhd, dep, anx.     9/27: Start qelbree for ADHD. Residual anxiety.     8/16: Improving. Increase seroquel. Await UDS. Low threshold to " start adderall for ADHD (if negative then start and have her sign CSA in 6 wks).     7/15: Encouraged continued cessation of cannabis. Start seroquel. Suspicion of bipolar. Hyperverbal, not sleeping well.     6/16: Increase depakote for irritability, anxiety, insomnia, HA's, poor appetite.    4/28: Already on propranolol.  Tolerating the Depakote which is helping.  Start Wellbutrin to target depression, anxiety, ADHD.  Patient smokes cannabis so we cannot start a stimulant.     3/3: Start therapy.  TBI, ADHD, PTSD, Panic disorder. R/O bipolar. Has never been on a stimulant. Start depakote at night. Consider klonipin, prazosin, stimulant.       PLAN:  Safety: No acute safety concerns  Therapy: Referral Made  Risk Assessment: Risk of self-harm acutely is moderate.  Risk factors include anxiety disorder, mood disorder, PTSD, AODA, access to weapons, and recent psychosocial stressors (pandemic). Protective factors include no family history, no present SI, no history of suicide attempts or self-harm in the past, healthcare seeking, future orientation, willingness to engage in care.  Risk of self-harm chronically is also moderate, but could be further elevated in the event of treatment noncompliance and/or AODA.  Meds:  CONTINUE Adderall XR 25 mg every morning, CONTINUE Adderall IR 10 mg at noon. Risks, benefits, side effects discussed with patient including elevated heart rate, elevated blood pressure, irritability, insomnia, sexual dysfunction, appetite suppressing properties, psychosis.  After discussion of these risks and benefits, the patient voiced understanding and agreed to proceed. Ronan reviewed, UDS ordered, and controlled substance agreement signed & witnessed.  S/P:  strattera 25 mg daily. Never started 10/23  vraylar 1.5 mg qhs.  Felt more depressed, 7/23.  seroquel 100 mg nightly. Crazy dreams.  propranolol 20 tid. Switched to metoprolol by cardiology.  qelbree 200 to 400 mg daily. HA. 2/23.  guanfacine ER  1 mg nightly. Dizziness. 3/23. We might come back to it.  Depakote 500 to 250 mg p.o. nightly. wg 5/23  Wellbutrin  mg daily.   Labs: UDS pos for thc 1/2023    Patient screened positive for depression based on a PHQ-9 score of  on . Follow-up recommendations include: Suicide Risk Assessment performed.           TREATMENT PLAN/GOALS: Continue supportive psychotherapy efforts and medications as indicated. Treatment and medication options discussed during today's visit. Patient acknowledged and verbally consented to continue with current treatment plan and was educated on the importance of compliance with treatment and follow-up appointments.    MEDICATION ISSUES:  MAURA reviewed as expected.  Discussed medication options and treatment plan of prescribed medication as well as the risks, benefits, and side effects including potential falls, possible impaired driving and metabolic adversities among others. Patient is agreeable to call the office with any worsening of symptoms or onset of side effects. Patient is agreeable to call 911 or go to the nearest ER should he/she begin having SI/HI. No medication side effects or related complaints today.     MEDS ORDERED DURING VISIT:  New Medications Ordered This Visit   Medications    amphetamine-dextroamphetamine (Adderall) 10 MG tablet     Sig: Take 1 tablet by mouth Daily.     Dispense:  30 tablet     Refill:  0    amphetamine-dextroamphetamine XR (ADDERALL XR) 25 MG 24 hr capsule     Sig: Take 1 capsule by mouth Daily     Dispense:  30 capsule     Refill:  0       Return in about 2 months (around 9/17/2024).         This document has been electronically signed by Sarita Schultz MD  July 17, 2024 11:56 EDT      Part of this note may be an electronic transcription/translation of spoken language to printed text using the Dragon Dictation System.

## 2024-07-22 ENCOUNTER — TELEPHONE (OUTPATIENT)
Dept: PSYCHIATRY | Facility: CLINIC | Age: 39
End: 2024-07-22
Payer: MEDICARE

## 2024-08-01 ENCOUNTER — HOSPITAL ENCOUNTER (OUTPATIENT)
Dept: CT IMAGING | Facility: HOSPITAL | Age: 39
Discharge: HOME | End: 2024-08-01
Payer: MEDICARE

## 2024-08-01 DIAGNOSIS — Z87.81 HISTORY OF PELVIC FRACTURE: ICD-10-CM

## 2024-08-01 DIAGNOSIS — M53.3 SI (SACROILIAC) PAIN: ICD-10-CM

## 2024-08-01 RX ORDER — LIDOCAINE HYDROCHLORIDE 20 MG/ML
INJECTION, SOLUTION INFILTRATION; PERINEURAL
Status: DISPENSED
Start: 2024-08-01 | End: 2024-08-01

## 2024-08-09 DIAGNOSIS — F90.0 ADHD (ATTENTION DEFICIT HYPERACTIVITY DISORDER), INATTENTIVE TYPE: ICD-10-CM

## 2024-08-09 RX ORDER — DEXTROAMPHETAMINE SACCHARATE, AMPHETAMINE ASPARTATE MONOHYDRATE, DEXTROAMPHETAMINE SULFATE AND AMPHETAMINE SULFATE 6.25; 6.25; 6.25; 6.25 MG/1; MG/1; MG/1; MG/1
25 CAPSULE, EXTENDED RELEASE ORAL DAILY
Qty: 30 CAPSULE | Refills: 0 | Status: SHIPPED | OUTPATIENT
Start: 2024-08-09

## 2024-08-09 NOTE — TELEPHONE ENCOUNTER
PT(PATIENT) VERIFIED     amphetamine-dextroamphetamine XR (ADDERALL XR) 25 MG 24 hr capsule (2024)     PT(PATIENT) STATES HER PHARMACY IS OUT OF STOCK     PT(PATIENT) IS REQUESTING FOR SCRIPT TO BE RESENT TO [facility]    PLEASE ADVISE

## 2024-08-13 ENCOUNTER — TELEPHONE (OUTPATIENT)
Dept: PSYCHIATRY | Facility: CLINIC | Age: 39
End: 2024-08-13
Payer: MEDICARE

## 2024-08-16 ENCOUNTER — OFFICE VISIT (OUTPATIENT)
Dept: FAMILY MEDICINE CLINIC | Facility: CLINIC | Age: 39
End: 2024-08-16
Payer: MEDICARE

## 2024-08-16 VITALS
WEIGHT: 117 LBS | HEART RATE: 72 BPM | SYSTOLIC BLOOD PRESSURE: 114 MMHG | DIASTOLIC BLOOD PRESSURE: 82 MMHG | HEIGHT: 68 IN | BODY MASS INDEX: 17.73 KG/M2 | OXYGEN SATURATION: 100 % | TEMPERATURE: 98.2 F

## 2024-08-16 DIAGNOSIS — M54.42 CHRONIC BILATERAL LOW BACK PAIN WITH LEFT-SIDED SCIATICA: ICD-10-CM

## 2024-08-16 DIAGNOSIS — M25.50 POLYARTHRALGIA: ICD-10-CM

## 2024-08-16 DIAGNOSIS — M50.30 DDD (DEGENERATIVE DISC DISEASE), CERVICAL: ICD-10-CM

## 2024-08-16 DIAGNOSIS — G89.29 CHRONIC BILATERAL LOW BACK PAIN WITH LEFT-SIDED SCIATICA: ICD-10-CM

## 2024-08-16 DIAGNOSIS — G62.9 NEUROPATHY: ICD-10-CM

## 2024-08-16 DIAGNOSIS — R23.3 BRUISES EASILY: ICD-10-CM

## 2024-08-16 DIAGNOSIS — Z00.00 ENCOUNTER FOR SUBSEQUENT ANNUAL WELLNESS VISIT (AWV) IN MEDICARE PATIENT: Primary | ICD-10-CM

## 2024-08-16 LAB
AMPHET+METHAMPHET UR QL: POSITIVE
AMPHETAMINE INTERNAL CONTROL: ABNORMAL
AMPHETAMINES UR QL: NEGATIVE
BARBITURATE INTERNAL CONTROL: ABNORMAL
BARBITURATES UR QL SCN: NEGATIVE
BASOPHILS # BLD AUTO: 0.04 10*3/MM3 (ref 0–0.2)
BASOPHILS NFR BLD AUTO: 0.7 % (ref 0–1.5)
BENZODIAZ UR QL SCN: NEGATIVE
BENZODIAZEPINE INTERNAL CONTROL: ABNORMAL
BUPRENORPHINE INTERNAL CONTROL: ABNORMAL
BUPRENORPHINE SERPL-MCNC: NEGATIVE NG/ML
CANNABINOIDS SERPL QL: POSITIVE
CHROMATIN AB SERPL-ACNC: <10 IU/ML (ref 0–14)
COCAINE INTERNAL CONTROL: ABNORMAL
COCAINE UR QL: NEGATIVE
CRP SERPL-MCNC: <0.3 MG/DL (ref 0–0.5)
DEPRECATED RDW RBC AUTO: 45 FL (ref 37–54)
EOSINOPHIL # BLD AUTO: 0.13 10*3/MM3 (ref 0–0.4)
EOSINOPHIL NFR BLD AUTO: 2.2 % (ref 0.3–6.2)
ERYTHROCYTE [DISTWIDTH] IN BLOOD BY AUTOMATED COUNT: 14.1 % (ref 12.3–15.4)
EXPIRATION DATE: ABNORMAL
HCT VFR BLD AUTO: 40.5 % (ref 34–46.6)
HGB BLD-MCNC: 13.3 G/DL (ref 12–15.9)
IMM GRANULOCYTES # BLD AUTO: 0.01 10*3/MM3 (ref 0–0.05)
IMM GRANULOCYTES NFR BLD AUTO: 0.2 % (ref 0–0.5)
LYMPHOCYTES # BLD AUTO: 2.46 10*3/MM3 (ref 0.7–3.1)
LYMPHOCYTES NFR BLD AUTO: 42.1 % (ref 19.6–45.3)
Lab: ABNORMAL
MCH RBC QN AUTO: 28.8 PG (ref 26.6–33)
MCHC RBC AUTO-ENTMCNC: 32.8 G/DL (ref 31.5–35.7)
MCV RBC AUTO: 87.7 FL (ref 79–97)
MDMA (ECSTASY) INTERNAL CONTROL: ABNORMAL
MDMA UR QL SCN: NEGATIVE
METHADONE INTERNAL CONTROL: ABNORMAL
METHADONE UR QL SCN: NEGATIVE
METHAMPHETAMINE INTERNAL CONTROL: ABNORMAL
MONOCYTES # BLD AUTO: 0.75 10*3/MM3 (ref 0.1–0.9)
MONOCYTES NFR BLD AUTO: 12.8 % (ref 5–12)
MORPHINE INTERNAL CONTROL: ABNORMAL
MORPHINE/OPIATES SCREEN, URINE: NEGATIVE
NEUTROPHILS NFR BLD AUTO: 2.46 10*3/MM3 (ref 1.7–7)
NEUTROPHILS NFR BLD AUTO: 42 % (ref 42.7–76)
NRBC BLD AUTO-RTO: 0 /100 WBC (ref 0–0.2)
OXYCODONE INTERNAL CONTROL: ABNORMAL
OXYCODONE UR QL SCN: NEGATIVE
PCP UR QL SCN: NEGATIVE
PHENCYCLIDINE INTERNAL CONTROL: ABNORMAL
PLATELET # BLD AUTO: 450 10*3/MM3 (ref 140–450)
PMV BLD AUTO: 9.5 FL (ref 6–12)
RBC # BLD AUTO: 4.62 10*6/MM3 (ref 3.77–5.28)
THC INTERNAL CONTROL: ABNORMAL
URATE SERPL-MCNC: 3.2 MG/DL (ref 2.4–5.7)
WBC NRBC COR # BLD AUTO: 5.85 10*3/MM3 (ref 3.4–10.8)

## 2024-08-16 PROCEDURE — G0439 PPPS, SUBSEQ VISIT: HCPCS | Performed by: NURSE PRACTITIONER

## 2024-08-16 PROCEDURE — 85025 COMPLETE CBC W/AUTO DIFF WBC: CPT | Performed by: NURSE PRACTITIONER

## 2024-08-16 PROCEDURE — 99214 OFFICE O/P EST MOD 30 MIN: CPT | Performed by: NURSE PRACTITIONER

## 2024-08-16 PROCEDURE — 86431 RHEUMATOID FACTOR QUANT: CPT | Performed by: NURSE PRACTITIONER

## 2024-08-16 PROCEDURE — 1125F AMNT PAIN NOTED PAIN PRSNT: CPT | Performed by: NURSE PRACTITIONER

## 2024-08-16 PROCEDURE — 86038 ANTINUCLEAR ANTIBODIES: CPT | Performed by: NURSE PRACTITIONER

## 2024-08-16 PROCEDURE — 86140 C-REACTIVE PROTEIN: CPT | Performed by: NURSE PRACTITIONER

## 2024-08-16 PROCEDURE — 84550 ASSAY OF BLOOD/URIC ACID: CPT | Performed by: NURSE PRACTITIONER

## 2024-08-16 RX ORDER — GABAPENTIN 300 MG/1
300 CAPSULE ORAL 3 TIMES DAILY
Qty: 90 CAPSULE | Refills: 2 | Status: SHIPPED | OUTPATIENT
Start: 2024-08-16

## 2024-08-16 RX ORDER — VARENICLINE TARTRATE 0.5 (11)-1
KIT ORAL
Qty: 1 EACH | Refills: 0 | Status: SHIPPED | OUTPATIENT
Start: 2024-08-16 | End: 2024-09-13

## 2024-08-16 RX ORDER — VARENICLINE TARTRATE 1 MG/1
1 TABLET, FILM COATED ORAL 2 TIMES DAILY
Qty: 56 TABLET | Refills: 1 | Status: SHIPPED | OUTPATIENT
Start: 2024-09-13 | End: 2024-11-08

## 2024-08-16 RX ORDER — MIDODRINE HYDROCHLORIDE 2.5 MG/1
2.5 TABLET ORAL DAILY
Qty: 30 TABLET | Refills: 2 | Status: SHIPPED | OUTPATIENT
Start: 2024-08-16

## 2024-08-16 NOTE — PROGRESS NOTES
Subjective   The ABCs of the Annual Wellness Visit  Medicare Wellness Visit      Seda Mobley is a 39 y.o. patient who presents for a Medicare Wellness Visit.    The following portions of the patient's history were reviewed and   updated as appropriate: allergies, current medications, past family history, past medical history, past social history, past surgical history, and problem list.    Compared to one year ago, the patient's physical   health is the same.  Compared to one year ago, the patient's mental   health is the same.    Recent Hospitalizations:  She was not admitted to the hospital during the last year.     Current Medical Providers:  Patient Care Team:  Bhupendra Hernandez APRN as PCP - General (Nurse Practitioner)    Outpatient Medications Prior to Visit   Medication Sig Dispense Refill   • amphetamine-dextroamphetamine (Adderall) 10 MG tablet Take 1 tablet by mouth Daily. 30 tablet 0   • amphetamine-dextroamphetamine XR (ADDERALL XR) 25 MG 24 hr capsule Take 1 capsule by mouth Daily 30 capsule 0   • [employer] CALCIUM-MAGNESIUM-ZINC PO Take  by mouth.     • cyclobenzaprine (FLEXERIL) 10 MG tablet TAKE ONE TABLET BY MOUTH TWICE DAILY AS NEEDED FOR MUSCLE SPASMS 60 tablet 0   • diclofenac (VOLTAREN) 50 MG EC tablet TAKE ONE TABLET BY MOUTH TWICE DAILY 60 tablet 0   • metoprolol succinate XL (TOPROL-XL) 25 MG 24 hr tablet TAKE 1 TABLET BY MOUTH TWICE DAILY 180 tablet 3   • Rimegepant Sulfate (Nurtec) 75 MG tablet dispersible tablet 1 tablet by Other route As Needed (for migraine). (Patient taking differently: 1 tablet by Other route As Needed (for migraine). USING SAMPLES VIA PCP) 6 tablet 0   • SUMAtriptan (IMITREX) 100 MG tablet Take 1 tablet by mouth As Needed for Migraine. USING SAMPLES VIA PCP     • triamcinolone (KENALOG) 0.1 % ointment Apply 1 application  topically to the appropriate area as directed 2 (Two) Times a Day. 80 g 3   • vitamin D (ERGOCALCIFEROL) 1.25 MG (42522 UT) capsule capsule Take  "1 capsule by mouth 1 (One) Time Per Week. 13 capsule 1   • gabapentin (NEURONTIN) 300 MG capsule Take 1 capsule by mouth 3 (Three) Times a Day. 90 capsule 2   • midodrine (PROAMATINE) 2.5 MG tablet TAKE 1 TABLET BY MOUTH DAILY 30 tablet 2     No facility-administered medications prior to visit.     No opioid medication identified on active medication list. I have reviewed chart for other potential  high risk medication/s and harmful drug interactions in the elderly.      Aspirin is not on active medication list.  Aspirin use is not indicated based on review of current medical condition/s. Risk of harm outweighs potential benefits.  .    Patient Active Problem List   Diagnosis   • Arthritis   • Depression   • Hemorrhoids   • Seasonal allergic rhinitis   • Dysphagia   • Palpitations   • Goiter   • Intractable chronic migraine without aura and without status migrainosus   • Panic disorder without agoraphobia   • Spinal cord injury, C1-C7, sequela   • Hyperthyroidism   • Trace mitral valve regurgitation   • Nicotine dependence with current use   • Breast pain   • Umbilical hernia without obstruction or gangrene   • Hypertrophic scar   • Malposition of breast implant   • Painful cutaneous scar     Advance Care Planning Advance Directive is not on file.  ACP discussion was held with the patient during this visit. Patient does not have an advance directive, information provided.            Objective   Vitals:    08/16/24 1354   BP: 114/82   BP Location: Right arm   Patient Position: Sitting   Cuff Size: Adult   Pulse: 72   Temp: 98.2 °F (36.8 °C)   TempSrc: Infrared   SpO2: 100%   Weight: 53.1 kg (117 lb)   Height: 172.7 cm (68\")   PainSc:   6   PainLoc: Hip  Comment: Both hips       Estimated body mass index is 17.79 kg/m² as calculated from the following:    Height as of this encounter: 172.7 cm (68\").    Weight as of this encounter: 53.1 kg (117 lb).            Does the patient have evidence of cognitive impairment? " No                                                                                                Health  Risk Assessment    Smoking Status:  Social History     Tobacco Use   Smoking Status Some Days   • Current packs/day: 0.00   • Average packs/day: 1 pack/day for 22.0 years (22.0 ttl pk-yrs)   • Types: Cigarettes   • Start date: 2001   • Last attempt to quit: 2023   • Years since quittin.5   • Passive exposure: Past   Smokeless Tobacco Never     Alcohol Consumption:  Social History     Substance and Sexual Activity   Alcohol Use Yes    Comment: Socially, occasionally       Fall Risk Screen  STEADI Fall Risk Assessment was completed, and patient is at HIGH risk for falls. Assessment completed on:2024    Depression Screenin/16/2024     1:58 PM   PHQ-2/PHQ-9 Depression Screening   Little Interest or Pleasure in Doing Things 0-->not at all   Feeling Down, Depressed or Hopeless 0-->not at all   PHQ-9: Brief Depression Severity Measure Score 0     Health Habits and Functional and Cognitive Screenin/16/2024     1:00 PM   Functional & Cognitive Status   Do you have difficulty preparing food and eating? Yes   Do you have difficulty bathing yourself, getting dressed or grooming yourself? No   Do you have difficulty using the toilet? No   Do you have difficulty moving around from place to place? Yes   Do you have trouble with steps or getting out of a bed or a chair? Yes   Current Diet Other        Current Diet Comment Pt forgets to eat   Dental Exam Up to date   Eye Exam Unknown   Exercise (times per week) 7 times per week   Current Exercises Include Walking   Do you need help using the phone?  No   Are you deaf or do you have serious difficulty hearing?  No   Do you need help to go to places out of walking distance? No   Do you need help shopping? Yes   Do you need help preparing meals?  No   Do you need help with housework?  Yes   Do you need help with laundry? No   Do you need help  taking your medications? Yes   Do you need help managing money? Yes   Do you ever drive or ride in a car without wearing a seat belt? Yes   Have you felt unusual stress, anger or loneliness in the last month? Yes   Who do you live with? Spouse   If you need help, do you have trouble finding someone available to you? No   Have you been bothered in the last four weeks by sexual problems? No   Do you have difficulty concentrating, remembering or making decisions? Yes           Age-appropriate Screening Schedule:  Refer to the list below for future screening recommendations based on patient's age, sex and/or medical conditions. Orders for these recommended tests are listed in the plan section. The patient has been provided with a written plan.    Health Maintenance List  Health Maintenance   Topic Date Due   • INFLUENZA VACCINE  08/01/2024   • TDAP/TD VACCINES (2 - Td or Tdap) 12/01/2024 (Originally 1/21/2024)   • COVID-19 Vaccine (1 - 2023-24 season) 12/07/2024 (Originally 9/1/2023)   • BMI FOLLOWUP  06/28/2025   • ANNUAL WELLNESS VISIT  08/16/2025   • PAP SMEAR  08/30/2025   • HEPATITIS C SCREENING  Completed   • Pneumococcal Vaccine 0-64  Completed                                                                                                                                                CMS Preventative Services Quick Reference  Risk Factors Identified During Encounter  Chronic Pain:  patient under care of specialist    The above risks/problems have been discussed with the patient.  Pertinent information has been shared with the patient in the After Visit Summary.  An After Visit Summary and PPPS were made available to the patient.    Follow Up:   Next Medicare Wellness visit to be scheduled in 1 year.         Additional E&M Note during same encounter follows:  Patient has additional, significant, and separately identifiable condition(s)/problem(s) that require work above and beyond the Medicare Wellness Visit      Chief Complaint  Medicare Wellness-subsequent, Hematoma (Bruising reported by patient all over body), Med Refill, Hip Pain (Pt reports 6 on pain scale and it hurts them to get out of bed, has recently been getting shots into the Right SI joint), and Arm Pain (Back of Right arm behind elbow hurting )    Subjective   Hip Pain   Incident onset: chronic. The pain is present in the left hip and right hip. The quality of the pain is described as aching. The pain is at a severity of 6/10. The pain is moderate. The pain has been Constant since onset. Pertinent negatives include no loss of motion, muscle weakness, numbness or tingling.   Arm Pain   There was no injury mechanism. Pain location: Right arm just proximal to the elbow. The quality of the pain is described as aching. Pertinent negatives include no muscle weakness, numbness or tingling. The symptoms are aggravated by lifting and palpation.     Seda is also being seen today for additional medical problem/s.    Review of Systems   Neurological:  Negative for tingling and numbness.      Patient has been diagnosed with POTS syndrome, she sees [facility] ER cardiology who manages that for her.    Patient has a current history of neuropathy, degenerative disc disease, chronic bilateral low back pain with left-sided sciatica.  She received gabapentin from [facility], she takes 300 mg 3 times daily, states that it does help with the pain, and requesting refills today.  Patient also sees Dr. Sanders Neurosurgery out of [facility].  She recently had MRI of C-spine and L-spine done, has not received results from Dr. Sanders I did discuss with patient there is no immediate emergent concerns after reviewing MRI did discuss there is curvature concerns of the lower back and to follow-up with Dr. Sanders with that issue.  Patient verbalized understanding.     Patient also has dyshidrotic eczema bilateral hands wax and wanes in intensity, she states she is headed most of her  "life, and was told as a kid that it was likely due to stress or allergy of some sort.  She recently was given triamcinolone cream from [facility], states that the medicines does seem to help.  Patient is needing refills.     Patient has a current history of vitamin D deficiency is in need of labs rechecked, she also has a history of migraine headaches sees Dr. Muniz neurology out of [facility] for her migraine she has been given samples of Nurtec, she is also taking Imitrex, and states that the medications together does seem to be working well.    Patient also complaining of bruising, she is noted a couple new bruises and she is unaware of how she got the bruises.  No previous history of anticoagulation disorder.  No previous history of lupus.  Denies any bleeding of gums, nasal bleeding, she has tried nothing for alleviation of bruising, symptoms have not resolved.  Objective   Vital Signs:  /82 (BP Location: Right arm, Patient Position: Sitting, Cuff Size: Adult)   Pulse 72   Temp 98.2 °F (36.8 °C) (Infrared)   Ht 172.7 cm (68\")   Wt 53.1 kg (117 lb)   SpO2 100%   BMI 17.79 kg/m²   Physical Exam  Vitals reviewed.   Constitutional:       General: She is not in acute distress.     Appearance: Normal appearance. She is well-developed. She is not ill-appearing.      Comments: thin   HENT:      Head: Normocephalic and atraumatic.   Eyes:      Conjunctiva/sclera: Conjunctivae normal.      Pupils: Pupils are equal, round, and reactive to light.   Cardiovascular:      Rate and Rhythm: Normal rate and regular rhythm.      Heart sounds: No murmur heard.  Pulmonary:      Effort: Pulmonary effort is normal.      Breath sounds: Normal breath sounds. No wheezing.   Musculoskeletal:      Right lower leg: No edema.      Left lower leg: No edema.      Comments: Bruising on right lower leg, upper arm   Skin:     General: Skin is warm and dry.   Neurological:      Mental Status: She is alert and oriented to " person, place, and time.   Psychiatric:         Mood and Affect: Mood and affect normal.         Behavior: Behavior normal.         Thought Content: Thought content normal.         Judgment: Judgment normal.       The following data was reviewed by: ALDAIR Hayes on 08/16/2024:        Assessment and Plan Additional age appropriate preventative wellness advice topics were discussed during today's preventative wellness exam(some topics already addressed during AWV portion of the note above):    Physical Activity: Advised cardiovascular activity 150 minutes per week as tolerated. (example brisk walk for 30 minutes, 5 days a week).      Pain Management Panel  More data exists         Latest Ref Rng & Units 8/16/2024 12/7/2023   Pain Management Panel   Amphetamine, Urine Qual Negative Positive  Positive    Barbiturates Screen, Urine Negative Negative  Negative    Benzodiazepine Screen, Urine Negative Negative  Negative    Buprenorphine, Screen, Urine Negative Negative  Negative    Cocaine Screen, Urine Negative Negative  Negative    Methadone Screen , Urine Negative Negative  Negative    Methamphetamine, Ur Negative Negative  Negative       Details                0.        Encounter for subsequent annual wellness visit (AWV) in Medicare patient    Polyarthralgia    Neuropathy    DDD (degenerative disc disease), cervical    Chronic bilateral low back pain with left-sided sciatica    Bruises easily    Patient needing refills of all medication.  UDS and Ronan appropriate.  Patient to follow back up with her neurosurgeon.  Will check platelets, and labs to rule out any acute abnormality.  Will also check RA panel with MARISSA and CRP.  Discussed return precautions.  Patient verbalized understanding and agreeable with treatment plan.    Orders Placed This Encounter   Procedures   • Uric acid     Order Specific Question:   Release to patient     Answer:   Routine Release [[id number]]   • Rheumatoid Factor     Order  Specific Question:   Release to patient     Answer:   Routine Release [[id number]]   • MARISSA     Order Specific Question:   Release to patient     Answer:   Routine Release [[id number]]   • C-reactive protein     Order Specific Question:   Release to patient     Answer:   Routine Release [[id number]]   • CBC Auto Differential     Order Specific Question:   Release to patient     Answer:   Routine Release [[id number]]   • POC Medline 12 Panel Urine Drug Screen     Order Specific Question:   Release to patient     Answer:   Routine Release [[id number]]   • CBC & Differential     Order Specific Question:   Manual Differential     Answer:   No     Order Specific Question:   Release to patient     Answer:   Routine Release [[id number]]     New Medications Ordered This Visit   Medications   • midodrine (PROAMATINE) 2.5 MG tablet     Sig: Take 1 tablet by mouth Daily.     Dispense:  30 tablet     Refill:  2   • gabapentin (NEURONTIN) 300 MG capsule     Sig: Take 1 capsule by mouth 3 (Three) Times a Day.     Dispense:  90 capsule     Refill:  2   • Varenicline Tartrate, Starter, 0.5 MG X 11 & 1 MG X 42 tablet therapy pack     Sig: Take 0.5 mg by mouth Daily for 3 days, THEN 0.5 mg 2 (Two) Times a Day for 4 days, THEN 1 mg 2 (Two) Times a Day for 21 days. Take 0.5 mg po daily x 3 days, then 0.5 mg po bid x 4 days, then 1 mg po bid     Dispense:  1 each     Refill:  0   • varenicline (Chantix Continuing Month Maurice) 1 MG tablet     Sig: Take 1 tablet by mouth 2 (Two) Times a Day for 56 days.     Dispense:  56 tablet     Refill:  1          Follow Up   No follow-ups on file.  Patient was given instructions and counseling regarding her condition or for health maintenance advice. Please see specific information pulled into the AVS if appropriate.

## 2024-08-19 LAB — ANA SER QL: NEGATIVE

## 2024-08-26 DIAGNOSIS — F90.0 ADHD (ATTENTION DEFICIT HYPERACTIVITY DISORDER), INATTENTIVE TYPE: ICD-10-CM

## 2024-08-26 RX ORDER — DEXTROAMPHETAMINE SACCHARATE, AMPHETAMINE ASPARTATE, DEXTROAMPHETAMINE SULFATE AND AMPHETAMINE SULFATE 2.5; 2.5; 2.5; 2.5 MG/1; MG/1; MG/1; MG/1
10 TABLET ORAL DAILY
Qty: 30 TABLET | Refills: 0 | Status: SHIPPED | OUTPATIENT
Start: 2024-08-26

## 2024-08-26 NOTE — TELEPHONE ENCOUNTER
CONTROLLED MEDICATION REFILL REQUEST    STATE REGULATION APPT EVERY 3 MONTHS     UDS(URINE DRUG SCREEN) EVERY 6 MONTHS OR UP TO PROVIDER PREFERENCE   (SCHULTZ 1 PER YEAR)     NEW NARC CONSENT EVERY YEAR      MEDICATION: amphetamine-dextroamphetamine (Adderall) 10 MG tablet (07/17/2024)     PT(PATIENT) CONFIRMED PHARMACY:   [facility]     NEXT OFFICE VISIT: Appointment with Sarita Schultz MD (09/17/2024)     LAST OFFICE VISIT: Office Visit with Sarita Schultz MD (07/17/2024)     NARC CONSENT: CONTROLLED SUBSTANCE AGREEMENT - SCAN (07/17/2024)     URINE DRUG SCREEN(STANDING ORDER)   (SCHULTZ 1 PER YEAR): POC Medline 12 Panel Urine Drug Screen (08/16/2024 15:06)     PROVIDER PLEASE ADVISE

## 2024-09-17 ENCOUNTER — OFFICE VISIT (OUTPATIENT)
Dept: PSYCHIATRY | Facility: CLINIC | Age: 39
End: 2024-09-17
Payer: MEDICARE

## 2024-09-17 VITALS
BODY MASS INDEX: 18.04 KG/M2 | DIASTOLIC BLOOD PRESSURE: 75 MMHG | HEART RATE: 86 BPM | WEIGHT: 119 LBS | SYSTOLIC BLOOD PRESSURE: 111 MMHG | HEIGHT: 68 IN

## 2024-09-17 DIAGNOSIS — F43.10 POST TRAUMATIC STRESS DISORDER (PTSD): ICD-10-CM

## 2024-09-17 DIAGNOSIS — F41.0 PANIC ATTACKS: ICD-10-CM

## 2024-09-17 DIAGNOSIS — F51.05 INSOMNIA DUE TO MENTAL CONDITION: ICD-10-CM

## 2024-09-17 DIAGNOSIS — F90.0 ADHD (ATTENTION DEFICIT HYPERACTIVITY DISORDER), INATTENTIVE TYPE: Primary | ICD-10-CM

## 2024-09-17 DIAGNOSIS — F41.1 GENERALIZED ANXIETY DISORDER: ICD-10-CM

## 2024-09-17 DIAGNOSIS — F33.1 MAJOR DEPRESSIVE DISORDER, RECURRENT EPISODE, MODERATE: ICD-10-CM

## 2024-09-17 DIAGNOSIS — S06.9X9D TRAUMATIC BRAIN INJURY WITH LOSS OF CONSCIOUSNESS, SUBSEQUENT ENCOUNTER: ICD-10-CM

## 2024-09-17 PROCEDURE — 1159F MED LIST DOCD IN RCRD: CPT | Performed by: STUDENT IN AN ORGANIZED HEALTH CARE EDUCATION/TRAINING PROGRAM

## 2024-09-17 PROCEDURE — 1160F RVW MEDS BY RX/DR IN RCRD: CPT | Performed by: STUDENT IN AN ORGANIZED HEALTH CARE EDUCATION/TRAINING PROGRAM

## 2024-09-17 PROCEDURE — 99214 OFFICE O/P EST MOD 30 MIN: CPT | Performed by: STUDENT IN AN ORGANIZED HEALTH CARE EDUCATION/TRAINING PROGRAM

## 2024-09-17 PROCEDURE — 90833 PSYTX W PT W E/M 30 MIN: CPT | Performed by: STUDENT IN AN ORGANIZED HEALTH CARE EDUCATION/TRAINING PROGRAM

## 2024-10-15 DIAGNOSIS — F90.0 ADHD (ATTENTION DEFICIT HYPERACTIVITY DISORDER), INATTENTIVE TYPE: ICD-10-CM

## 2024-10-15 RX ORDER — DEXTROAMPHETAMINE SACCHARATE, AMPHETAMINE ASPARTATE, DEXTROAMPHETAMINE SULFATE AND AMPHETAMINE SULFATE 2.5; 2.5; 2.5; 2.5 MG/1; MG/1; MG/1; MG/1
10 TABLET ORAL DAILY
Qty: 30 TABLET | Refills: 0 | Status: SHIPPED | OUTPATIENT
Start: 2024-10-15

## 2024-10-15 RX ORDER — DEXTROAMPHETAMINE SACCHARATE, AMPHETAMINE ASPARTATE MONOHYDRATE, DEXTROAMPHETAMINE SULFATE AND AMPHETAMINE SULFATE 6.25; 6.25; 6.25; 6.25 MG/1; MG/1; MG/1; MG/1
25 CAPSULE, EXTENDED RELEASE ORAL DAILY
Qty: 30 CAPSULE | Refills: 0 | Status: SHIPPED | OUTPATIENT
Start: 2024-10-15

## 2024-10-21 ENCOUNTER — TELEPHONE (OUTPATIENT)
Dept: PSYCHIATRY | Facility: CLINIC | Age: 39
End: 2024-10-21
Payer: MEDICARE

## 2024-10-22 NOTE — TELEPHONE ENCOUNTER
Called pt's pharmacy to ask them if the pa had been approved.  Pt picked up through [facility] already

## 2024-11-07 ENCOUNTER — OFFICE VISIT (OUTPATIENT)
Dept: PSYCHIATRY | Facility: CLINIC | Age: 39
End: 2024-11-07
Payer: MEDICARE

## 2024-11-07 VITALS
WEIGHT: 124 LBS | DIASTOLIC BLOOD PRESSURE: 73 MMHG | BODY MASS INDEX: 18.79 KG/M2 | HEIGHT: 68 IN | OXYGEN SATURATION: 100 % | HEART RATE: 102 BPM | SYSTOLIC BLOOD PRESSURE: 122 MMHG

## 2024-11-07 DIAGNOSIS — S06.9X9D TRAUMATIC BRAIN INJURY WITH LOSS OF CONSCIOUSNESS, SUBSEQUENT ENCOUNTER: ICD-10-CM

## 2024-11-07 DIAGNOSIS — F43.10 POST TRAUMATIC STRESS DISORDER (PTSD): ICD-10-CM

## 2024-11-07 DIAGNOSIS — F41.1 GENERALIZED ANXIETY DISORDER: ICD-10-CM

## 2024-11-07 DIAGNOSIS — Z79.899 OTHER LONG TERM (CURRENT) DRUG THERAPY: ICD-10-CM

## 2024-11-07 DIAGNOSIS — F90.0 ADHD (ATTENTION DEFICIT HYPERACTIVITY DISORDER), INATTENTIVE TYPE: Primary | ICD-10-CM

## 2024-11-07 DIAGNOSIS — F51.05 INSOMNIA DUE TO MENTAL CONDITION: ICD-10-CM

## 2024-11-07 DIAGNOSIS — F41.0 PANIC ATTACKS: ICD-10-CM

## 2024-11-07 DIAGNOSIS — F33.1 MAJOR DEPRESSIVE DISORDER, RECURRENT EPISODE, MODERATE: ICD-10-CM

## 2024-11-07 RX ORDER — DEXTROAMPHETAMINE SACCHARATE, AMPHETAMINE ASPARTATE, DEXTROAMPHETAMINE SULFATE AND AMPHETAMINE SULFATE 2.5; 2.5; 2.5; 2.5 MG/1; MG/1; MG/1; MG/1
10 TABLET ORAL DAILY
Qty: 30 TABLET | Refills: 0 | Status: SHIPPED | OUTPATIENT
Start: 2024-11-14

## 2024-11-07 RX ORDER — DEXTROAMPHETAMINE SACCHARATE, AMPHETAMINE ASPARTATE MONOHYDRATE, DEXTROAMPHETAMINE SULFATE AND AMPHETAMINE SULFATE 6.25; 6.25; 6.25; 6.25 MG/1; MG/1; MG/1; MG/1
25 CAPSULE, EXTENDED RELEASE ORAL DAILY
Qty: 30 CAPSULE | Refills: 0 | Status: SHIPPED | OUTPATIENT
Start: 2024-11-18

## 2024-11-07 NOTE — PATIENT INSTRUCTIONS
1.  Please return to clinic at your next scheduled visit.  Contact the clinic ([phone]) at least 24 hours prior in the event you need to cancel.  2.  Do no harm to yourself or others.    3.  Avoid alcohol and drugs.    4.  Take all medications as prescribed.  Please contact the clinic with any concerns. If you are in need of medication refills, please call the clinic at [phone].    5. Should you want to get in touch with your provider, Dr. Sarita Schultz, please utilize LIANAI or contact the office ([phone]), and staff will be able to page Dr. Schultz directly.  6.  In the event you have personal crisis, contact the following crisis numbers: Suicide Prevention Hotline 1-984.465.2084; LEONCIO Helpline 6-511-351-LEONCIO; [facility] Emergency Room [phone]; text HELLO to 705241; or 397.
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
## 2024-12-10 DIAGNOSIS — F90.0 ADHD (ATTENTION DEFICIT HYPERACTIVITY DISORDER), INATTENTIVE TYPE: ICD-10-CM

## 2024-12-10 RX ORDER — DEXTROAMPHETAMINE SACCHARATE, AMPHETAMINE ASPARTATE, DEXTROAMPHETAMINE SULFATE AND AMPHETAMINE SULFATE 2.5; 2.5; 2.5; 2.5 MG/1; MG/1; MG/1; MG/1
10 TABLET ORAL DAILY
Qty: 30 TABLET | Refills: 0 | Status: SHIPPED | OUTPATIENT
Start: 2024-12-14

## 2024-12-10 RX ORDER — DEXTROAMPHETAMINE SACCHARATE, AMPHETAMINE ASPARTATE MONOHYDRATE, DEXTROAMPHETAMINE SULFATE AND AMPHETAMINE SULFATE 6.25; 6.25; 6.25; 6.25 MG/1; MG/1; MG/1; MG/1
25 CAPSULE, EXTENDED RELEASE ORAL DAILY
Qty: 30 CAPSULE | Refills: 0 | Status: SHIPPED | OUTPATIENT
Start: 2024-12-17

## 2024-12-10 NOTE — TELEPHONE ENCOUNTER
CONTROLLED MEDICATION REFILL REQUEST    STATE REGULATION APPT EVERY 3 MONTHS     UDS(URINE DRUG SCREEN) EVERY 6 MONTHS OR UP TO PROVIDER PREFERENCE   (JAGJIT HUYNH & EDVIN 1 PER YEAR)     NEW NARC CONSENT EVERY YEAR      MEDICATION: amphetamine-dextroamphetamine (Adderall) 10 MG tablet (11/14/2024)  amphetamine-dextroamphetamine XR (ADDERALL XR) 25 MG 24 hr capsule (11/18/2024)    PT(PATIENT) CONFIRMED PHARMACY: [facility]      NEXT OFFICE VISIT: Appointment with Sarita Schultz MD (12/26/2024)     LAST OFFICE VISIT: Office Visit with Sarita Schultz MD (11/07/2024)     NARC CONSENT: CONTROLLED SUBSTANCE AGREEMENT - SCAN (09/17/2024)     URINE DRUG SCREEN(STANDING ORDER)   (JAGJIT SCHULTZ 1 PER YEAR): POC Medline 12 Panel Urine Drug Screen (08/16/2024 15:06)     PROVIDER PLEASE ADVISE

## 2024-12-22 DIAGNOSIS — M54.42 CHRONIC BILATERAL LOW BACK PAIN WITH LEFT-SIDED SCIATICA: ICD-10-CM

## 2024-12-22 DIAGNOSIS — G89.29 CHRONIC BILATERAL LOW BACK PAIN WITH LEFT-SIDED SCIATICA: ICD-10-CM

## 2024-12-23 RX ORDER — CYCLOBENZAPRINE HCL 10 MG
10 TABLET ORAL 2 TIMES DAILY PRN
Qty: 60 TABLET | Refills: 0 | Status: SHIPPED | OUTPATIENT
Start: 2024-12-23

## 2025-01-13 ENCOUNTER — HOSPITAL ENCOUNTER (EMERGENCY)
Facility: HOSPITAL | Age: 40
Discharge: HOME | End: 2025-01-13
Attending: EMERGENCY MEDICINE | Admitting: EMERGENCY MEDICINE
Payer: MEDICARE

## 2025-01-13 VITALS
TEMPERATURE: 98 F | SYSTOLIC BLOOD PRESSURE: 113 MMHG | HEART RATE: 98 BPM | RESPIRATION RATE: 16 BRPM | WEIGHT: 128.53 LBS | HEIGHT: 68 IN | DIASTOLIC BLOOD PRESSURE: 72 MMHG | OXYGEN SATURATION: 100 % | BODY MASS INDEX: 19.48 KG/M2

## 2025-01-13 DIAGNOSIS — S61.412A LACERATION OF LEFT PALM, INITIAL ENCOUNTER: Primary | ICD-10-CM

## 2025-01-13 PROCEDURE — 90715 TDAP VACCINE 7 YRS/> IM: CPT

## 2025-01-13 PROCEDURE — 90471 IMMUNIZATION ADMIN: CPT

## 2025-01-13 PROCEDURE — 99282 EMERGENCY DEPT VISIT SF MDM: CPT

## 2025-01-13 PROCEDURE — 25010000002 TETANUS-DIPHTH-ACELL PERTUSSIS 5-2.5-18.5 LF-MCG/0.5 SUSPENSION PREFILLED SYRINGE

## 2025-01-13 RX ADMIN — TETANUS TOXOID, REDUCED DIPHTHERIA TOXOID AND ACELLULAR PERTUSSIS VACCINE, ADSORBED 0.5 ML: 5; 2.5; 8; 8; 2.5 SUSPENSION INTRAMUSCULAR at 14:17
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
## 2025-01-13 NOTE — ED TRIAGE NOTES
Pt arrives to ED with complaints of cut to left hand .. Pt was taking her dogs outside and slipped on the ice .. Pt wound is currently not bleeding in triage    No Yes

## 2025-01-13 NOTE — DISCHARGE INSTRUCTIONS
Keep the laceration clean and dry.  Monitor for signs infection such as redness, drainage, fever and if these occur return for reevaluation.  Allow the Steri-Strips to fall off on its own.

## 2025-01-17 DIAGNOSIS — F90.0 ADHD (ATTENTION DEFICIT HYPERACTIVITY DISORDER), INATTENTIVE TYPE: ICD-10-CM

## 2025-01-17 RX ORDER — DEXTROAMPHETAMINE SACCHARATE, AMPHETAMINE ASPARTATE MONOHYDRATE, DEXTROAMPHETAMINE SULFATE AND AMPHETAMINE SULFATE 6.25; 6.25; 6.25; 6.25 MG/1; MG/1; MG/1; MG/1
25 CAPSULE, EXTENDED RELEASE ORAL DAILY
Qty: 30 CAPSULE | Refills: 0 | Status: SHIPPED | OUTPATIENT
Start: 2025-01-17

## 2025-01-17 RX ORDER — DEXTROAMPHETAMINE SACCHARATE, AMPHETAMINE ASPARTATE, DEXTROAMPHETAMINE SULFATE AND AMPHETAMINE SULFATE 2.5; 2.5; 2.5; 2.5 MG/1; MG/1; MG/1; MG/1
10 TABLET ORAL DAILY
Qty: 30 TABLET | Refills: 0 | Status: SHIPPED | OUTPATIENT
Start: 2025-01-17

## 2025-01-17 NOTE — TELEPHONE ENCOUNTER
REFILL REQUEST:     amphetamine-dextroamphetamine (Adderall) 10 MG tablet (12/14/2024)     amphetamine-dextroamphetamine XR (ADDERALL XR) 25 MG 24 hr capsule (12/17/2024)     F/UP- 01/20/2025.  LOV: 11/07/2024.    LAST UDS:  POC Medline 12 Panel Urine Drug Screen (08/16/2024 15:06)

## 2025-01-20 ENCOUNTER — OFFICE VISIT (OUTPATIENT)
Dept: PSYCHIATRY | Facility: CLINIC | Age: 40
End: 2025-01-20
Payer: MEDICARE

## 2025-01-20 VITALS
SYSTOLIC BLOOD PRESSURE: 125 MMHG | HEIGHT: 68 IN | DIASTOLIC BLOOD PRESSURE: 75 MMHG | OXYGEN SATURATION: 100 % | BODY MASS INDEX: 20.52 KG/M2 | HEART RATE: 99 BPM | WEIGHT: 135.4 LBS

## 2025-01-20 DIAGNOSIS — F33.1 MAJOR DEPRESSIVE DISORDER, RECURRENT EPISODE, MODERATE: ICD-10-CM

## 2025-01-20 DIAGNOSIS — F90.0 ADHD (ATTENTION DEFICIT HYPERACTIVITY DISORDER), INATTENTIVE TYPE: Primary | ICD-10-CM

## 2025-01-20 DIAGNOSIS — F41.1 GENERALIZED ANXIETY DISORDER: ICD-10-CM

## 2025-01-20 DIAGNOSIS — F43.10 POST TRAUMATIC STRESS DISORDER (PTSD): ICD-10-CM

## 2025-01-20 DIAGNOSIS — S06.9X9D TRAUMATIC BRAIN INJURY WITH LOSS OF CONSCIOUSNESS, SUBSEQUENT ENCOUNTER: ICD-10-CM

## 2025-01-20 DIAGNOSIS — F41.0 PANIC ATTACKS: ICD-10-CM

## 2025-01-20 DIAGNOSIS — Z79.899 OTHER LONG TERM (CURRENT) DRUG THERAPY: ICD-10-CM

## 2025-01-20 DIAGNOSIS — F51.05 INSOMNIA DUE TO MENTAL CONDITION: ICD-10-CM

## 2025-01-20 PROCEDURE — 1159F MED LIST DOCD IN RCRD: CPT | Performed by: STUDENT IN AN ORGANIZED HEALTH CARE EDUCATION/TRAINING PROGRAM

## 2025-01-20 PROCEDURE — 1160F RVW MEDS BY RX/DR IN RCRD: CPT | Performed by: STUDENT IN AN ORGANIZED HEALTH CARE EDUCATION/TRAINING PROGRAM

## 2025-01-20 PROCEDURE — G2211 COMPLEX E/M VISIT ADD ON: HCPCS | Performed by: STUDENT IN AN ORGANIZED HEALTH CARE EDUCATION/TRAINING PROGRAM

## 2025-01-20 PROCEDURE — 99214 OFFICE O/P EST MOD 30 MIN: CPT | Performed by: STUDENT IN AN ORGANIZED HEALTH CARE EDUCATION/TRAINING PROGRAM

## 2025-01-20 PROCEDURE — 90833 PSYTX W PT W E/M 30 MIN: CPT | Performed by: STUDENT IN AN ORGANIZED HEALTH CARE EDUCATION/TRAINING PROGRAM

## 2025-01-20 NOTE — TREATMENT PLAN
Anxiety:  2/10 progressing    Goals:  Patient will develop and implement behavioral and cognitive strategies to reduce anxiety and irrational fears, monthly, using Rogerian psychotherapy and CBT where appropriate.  Help patient explore past emotional issues in relation to present anxiety, monthly, until remission of symptoms, using Rogerian psychotherapy and CBT where appropriate.  Help patient develop an awareness of their cognitive and physical responses to anxiety, monthly, until remission of symptoms, using Rogerian psychotherapy and CBT where appropriate.          Depression:  2/10 progressing    Goals:  Patient will demonstrate the ability to initiate new constructive life skills outside of sessions on a consistent basis, monthly, using Rogerian psychotherapy and CBT where appropriate.  Assist patient in setting attainable activities of daily living goals, monthly, using Rogerian psychotherapy and CBT where appropriate.  Provide education about depression, monthly, using Rogerian psychotherapy and CBT where appropriate.  Assist patient in developing healthy coping strategies, monthly, using Rogerian psychotherapy and CBT where appropriate.    Rogerian psychotherapy and CBT will be used to help accomplish the above goals and manage depression and anxiety related to  trying to quit smoking, family conflict, family well-being, medical conditions       I have discussed and reviewed this treatment plan with the patient.      Reviewed by Sarita Schultz MD   01/20/2025
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
## 2025-01-30 DIAGNOSIS — G89.29 CHRONIC BILATERAL LOW BACK PAIN WITH LEFT-SIDED SCIATICA: ICD-10-CM

## 2025-01-30 DIAGNOSIS — M54.42 CHRONIC BILATERAL LOW BACK PAIN WITH LEFT-SIDED SCIATICA: ICD-10-CM

## 2025-01-31 RX ORDER — CYCLOBENZAPRINE HCL 10 MG
10 TABLET ORAL 2 TIMES DAILY PRN
Qty: 60 TABLET | Refills: 0 | Status: SHIPPED | OUTPATIENT
Start: 2025-01-31

## 2025-02-12 ENCOUNTER — OFFICE VISIT (OUTPATIENT)
Dept: OBSTETRICS AND GYNECOLOGY | Age: 40
End: 2025-02-12
Payer: MEDICARE

## 2025-02-12 VITALS
SYSTOLIC BLOOD PRESSURE: 108 MMHG | BODY MASS INDEX: 19.94 KG/M2 | HEIGHT: 68 IN | DIASTOLIC BLOOD PRESSURE: 60 MMHG | WEIGHT: 131.6 LBS

## 2025-02-12 DIAGNOSIS — Z76.89 ENCOUNTER TO ESTABLISH CARE: ICD-10-CM

## 2025-02-12 DIAGNOSIS — Z87.42 HISTORY OF OVARIAN CYST: Primary | ICD-10-CM

## 2025-02-12 DIAGNOSIS — N94.6 DYSMENORRHEA: ICD-10-CM

## 2025-02-12 RX ORDER — GABAPENTIN 600 MG/1
TABLET ORAL
COMMUNITY
Start: 2025-01-30
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
## 2025-02-14 DIAGNOSIS — E04.2 MULTIPLE THYROID NODULES: Primary | ICD-10-CM

## 2025-02-17 DIAGNOSIS — E04.2 MULTIPLE THYROID NODULES: Primary | ICD-10-CM

## 2025-02-18 DIAGNOSIS — F90.0 ADHD (ATTENTION DEFICIT HYPERACTIVITY DISORDER), INATTENTIVE TYPE: ICD-10-CM

## 2025-02-18 RX ORDER — DEXTROAMPHETAMINE SACCHARATE, AMPHETAMINE ASPARTATE MONOHYDRATE, DEXTROAMPHETAMINE SULFATE AND AMPHETAMINE SULFATE 6.25; 6.25; 6.25; 6.25 MG/1; MG/1; MG/1; MG/1
25 CAPSULE, EXTENDED RELEASE ORAL DAILY
Qty: 30 CAPSULE | Refills: 0 | Status: SHIPPED | OUTPATIENT
Start: 2025-02-18

## 2025-02-18 RX ORDER — DEXTROAMPHETAMINE SACCHARATE, AMPHETAMINE ASPARTATE, DEXTROAMPHETAMINE SULFATE AND AMPHETAMINE SULFATE 2.5; 2.5; 2.5; 2.5 MG/1; MG/1; MG/1; MG/1
10 TABLET ORAL DAILY
Qty: 30 TABLET | Refills: 0 | Status: SHIPPED | OUTPATIENT
Start: 2025-02-18

## 2025-02-18 NOTE — TELEPHONE ENCOUNTER
REFILL REQUEST:     amphetamine-dextroamphetamine XR (ADDERALL XR) 25 MG 24 hr capsule (01/17/2025)     amphetamine-dextroamphetamine (Adderall) 10 MG tablet (01/17/2025)     F/UP- 03/03/2025.  LOV: 01/20/2025.    LAST UDS:  POC Medline 12 Panel Urine Drug Screen (08/16/2024 15:06)

## 2025-02-21 ENCOUNTER — LAB (OUTPATIENT)
Facility: HOSPITAL | Age: 40
End: 2025-02-21
Payer: MEDICARE

## 2025-02-21 ENCOUNTER — OFFICE VISIT (OUTPATIENT)
Dept: ENDOCRINOLOGY | Age: 40
End: 2025-02-21
Payer: MEDICARE

## 2025-02-21 VITALS
DIASTOLIC BLOOD PRESSURE: 84 MMHG | HEART RATE: 103 BPM | BODY MASS INDEX: 20.07 KG/M2 | OXYGEN SATURATION: 99 % | SYSTOLIC BLOOD PRESSURE: 110 MMHG | TEMPERATURE: 97.5 F | WEIGHT: 132 LBS

## 2025-02-21 DIAGNOSIS — E04.2 MULTINODULAR GOITER: Primary | ICD-10-CM

## 2025-02-21 DIAGNOSIS — E07.9 THYROID DISEASE: ICD-10-CM

## 2025-02-21 LAB
T4 FREE SERPL-MCNC: 1.27 NG/DL (ref 0.92–1.68)
TSH SERPL DL<=0.05 MIU/L-ACNC: 0.75 UIU/ML (ref 0.27–4.2)

## 2025-02-21 PROCEDURE — 84445 ASSAY OF TSI GLOBULIN: CPT | Performed by: STUDENT IN AN ORGANIZED HEALTH CARE EDUCATION/TRAINING PROGRAM

## 2025-02-21 PROCEDURE — 86800 THYROGLOBULIN ANTIBODY: CPT | Performed by: STUDENT IN AN ORGANIZED HEALTH CARE EDUCATION/TRAINING PROGRAM

## 2025-02-21 PROCEDURE — 84443 ASSAY THYROID STIM HORMONE: CPT | Performed by: STUDENT IN AN ORGANIZED HEALTH CARE EDUCATION/TRAINING PROGRAM

## 2025-02-21 PROCEDURE — 36415 COLL VENOUS BLD VENIPUNCTURE: CPT | Performed by: STUDENT IN AN ORGANIZED HEALTH CARE EDUCATION/TRAINING PROGRAM

## 2025-02-21 PROCEDURE — 84439 ASSAY OF FREE THYROXINE: CPT | Performed by: STUDENT IN AN ORGANIZED HEALTH CARE EDUCATION/TRAINING PROGRAM
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
## 2025-02-21 NOTE — ASSESSMENT & PLAN NOTE
Was told to have thyroid disease and evaluated by [facility]  Did not have to take thyroid medications during her pregnancies  No abnormal TFT  Will check thyroid antibodies and TFT

## 2025-02-23 LAB — TSI SER-ACNC: <0.1 IU/L (ref 0–0.55)

## 2025-02-24 LAB — THYROGLOB AB SERPL-ACNC: <1 IU/ML (ref 0–0.9)

## 2025-02-25 ENCOUNTER — TELEPHONE (OUTPATIENT)
Dept: ENDOCRINOLOGY | Age: 40
End: 2025-02-25
Payer: MEDICARE

## 2025-02-25 NOTE — TELEPHONE ENCOUNTER
Called and discussed the result, TFT normal and thyroid antibodies negative  She will let me know when thyroid ultrasound results are in

## 2025-02-26 ENCOUNTER — HOSPITAL ENCOUNTER (OUTPATIENT)
Dept: ULTRASOUND IMAGING | Facility: HOSPITAL | Age: 40
Discharge: HOME | End: 2025-02-26
Admitting: NURSE PRACTITIONER
Payer: MEDICARE

## 2025-02-26 DIAGNOSIS — E04.2 MULTIPLE THYROID NODULES: ICD-10-CM

## 2025-02-26 PROCEDURE — 76536 US EXAM OF HEAD AND NECK: CPT

## 2025-03-03 ENCOUNTER — OFFICE VISIT (OUTPATIENT)
Dept: PSYCHIATRY | Facility: CLINIC | Age: 40
End: 2025-03-03
Payer: MEDICARE

## 2025-03-03 VITALS
HEIGHT: 68 IN | SYSTOLIC BLOOD PRESSURE: 122 MMHG | DIASTOLIC BLOOD PRESSURE: 76 MMHG | BODY MASS INDEX: 19.82 KG/M2 | HEART RATE: 86 BPM | WEIGHT: 130.8 LBS

## 2025-03-03 DIAGNOSIS — F41.0 PANIC ATTACKS: ICD-10-CM

## 2025-03-03 DIAGNOSIS — Z79.899 OTHER LONG TERM (CURRENT) DRUG THERAPY: ICD-10-CM

## 2025-03-03 DIAGNOSIS — F51.05 INSOMNIA DUE TO MENTAL CONDITION: ICD-10-CM

## 2025-03-03 DIAGNOSIS — F41.1 GENERALIZED ANXIETY DISORDER: ICD-10-CM

## 2025-03-03 DIAGNOSIS — F43.10 POST TRAUMATIC STRESS DISORDER (PTSD): ICD-10-CM

## 2025-03-03 DIAGNOSIS — F90.0 ADHD (ATTENTION DEFICIT HYPERACTIVITY DISORDER), INATTENTIVE TYPE: Primary | ICD-10-CM

## 2025-03-03 DIAGNOSIS — F33.1 MAJOR DEPRESSIVE DISORDER, RECURRENT EPISODE, MODERATE: ICD-10-CM

## 2025-03-03 DIAGNOSIS — S06.9X9D TRAUMATIC BRAIN INJURY WITH LOSS OF CONSCIOUSNESS, SUBSEQUENT ENCOUNTER: ICD-10-CM

## 2025-03-03 PROCEDURE — 1160F RVW MEDS BY RX/DR IN RCRD: CPT | Performed by: STUDENT IN AN ORGANIZED HEALTH CARE EDUCATION/TRAINING PROGRAM

## 2025-03-03 PROCEDURE — 90833 PSYTX W PT W E/M 30 MIN: CPT | Performed by: STUDENT IN AN ORGANIZED HEALTH CARE EDUCATION/TRAINING PROGRAM

## 2025-03-03 PROCEDURE — 99214 OFFICE O/P EST MOD 30 MIN: CPT | Performed by: STUDENT IN AN ORGANIZED HEALTH CARE EDUCATION/TRAINING PROGRAM

## 2025-03-03 PROCEDURE — 1159F MED LIST DOCD IN RCRD: CPT | Performed by: STUDENT IN AN ORGANIZED HEALTH CARE EDUCATION/TRAINING PROGRAM

## 2025-03-03 RX ORDER — DEXTROAMPHETAMINE SACCHARATE, AMPHETAMINE ASPARTATE MONOHYDRATE, DEXTROAMPHETAMINE SULFATE AND AMPHETAMINE SULFATE 6.25; 6.25; 6.25; 6.25 MG/1; MG/1; MG/1; MG/1
25 CAPSULE, EXTENDED RELEASE ORAL DAILY
Qty: 30 CAPSULE | Refills: 0 | Status: SHIPPED | OUTPATIENT
Start: 2025-03-21

## 2025-03-03 RX ORDER — DEXTROAMPHETAMINE SACCHARATE, AMPHETAMINE ASPARTATE, DEXTROAMPHETAMINE SULFATE AND AMPHETAMINE SULFATE 2.5; 2.5; 2.5; 2.5 MG/1; MG/1; MG/1; MG/1
10 TABLET ORAL DAILY
Qty: 30 TABLET | Refills: 0 | Status: SHIPPED | OUTPATIENT
Start: 2025-03-20

## 2025-03-03 NOTE — PROGRESS NOTES
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 acutely is moderate.  Risk factors include anxiety disorder, mood disorder, PTSD, AODA, access to weapons, and recent psychosocial stressors (pandemic). Protective factors include no family history, no present SI, no history of suicide attempts or self-harm in the past, healthcare seeking, future orientation, willingness to engage in care.  Risk of self-harm chronically is also moderate, but could be further elevated in the event of treatment noncompliance and/or AODA.  Meds:  CONTINUE Adderall XR 25 mg every morning, CONTINUE Adderall IR 10 mg at noon. Risks, benefits, side effects discussed with patient including elevated heart rate, elevated blood pressure, irritability, insomnia, sexual dysfunction, appetite suppressing properties, psychosis.  After discussion of these risks and benefits, the patient voiced understanding and agreed to proceed. Ronan reviewed, UDS ordered, and controlled substance agreement signed & witnessed.  S/P:  strattera 25 mg daily. Never started 10/23  vraylar 1.5 mg qhs.  Felt more depressed, 7/23.  seroquel 100 mg nightly. Crazy dreams.  propranolol 20 tid.  Switched to metoprolol by cardiology.  qelbree 200 to 400 mg daily. HA. 2/23.  guanfacine ER 1 mg nightly. Dizziness. 3/23. We might come back to it.  Depakote 500 to 250 mg p.o. nightly. wg 5/23  Wellbutrin  mg daily. Made her irritable.  Abilify made me crazy.  Zoloft made her paranoid, irritable  Guanfacine lowered her bp too much  Buspar didn't work well  Labs: UDS pos for thc 8/24    Patient screened positive for depression based on a PHQ-9 score of  on . Follow-up recommendations include: Suicide Risk Assessment performed.           TREATMENT PLAN/GOALS: Continue supportive psychotherapy efforts and medications as indicated. Treatment and medication options discussed during today's visit. Patient acknowledged and verbally consented to continue with current treatment plan and was educated on the importance of compliance with treatment and follow-up appointments.    MEDICATION ISSUES:  MAURA reviewed as expected.  Discussed medication options and treatment plan of prescribed medication as well as the risks, benefits, and side effects including potential falls, possible impaired driving and metabolic adversities among others. Patient is agreeable to call the office with any worsening of symptoms or onset of side effects. Patient is agreeable to call 911 or go to the nearest ER should he/she begin having SI/HI. No medication side effects or related complaints today.     MEDS ORDERED DURING VISIT:  New Medications Ordered This Visit   Medications    amphetamine-dextroamphetamine XR (ADDERALL XR) 25 MG 24 hr capsule     Sig: Take 1 capsule by mouth Daily     Dispense:  30 capsule     Refill:  0    amphetamine-dextroamphetamine (Adderall) 10 MG tablet     Sig: Take 1 tablet by mouth Daily.     Dispense:  30 tablet     Refill:  0       Return in about 6 weeks (around 4/14/2025).         This document has been electronically signed by Sarita Schultz MD  March 3, 2025 15:52 EST      Part of this note may be an  electronic transcription/translation of spoken language to printed text using the Dragon Dictation System.

## 2025-03-06 DIAGNOSIS — G89.29 CHRONIC BILATERAL LOW BACK PAIN WITH LEFT-SIDED SCIATICA: ICD-10-CM

## 2025-03-06 DIAGNOSIS — M54.42 CHRONIC BILATERAL LOW BACK PAIN WITH LEFT-SIDED SCIATICA: ICD-10-CM

## 2025-03-06 RX ORDER — CYCLOBENZAPRINE HCL 10 MG
10 TABLET ORAL 2 TIMES DAILY PRN
Qty: 60 TABLET | Refills: 0 | Status: SHIPPED | OUTPATIENT
Start: 2025-03-06

## 2025-04-06 DIAGNOSIS — G89.29 CHRONIC BILATERAL LOW BACK PAIN WITH LEFT-SIDED SCIATICA: ICD-10-CM

## 2025-04-06 DIAGNOSIS — M54.42 CHRONIC BILATERAL LOW BACK PAIN WITH LEFT-SIDED SCIATICA: ICD-10-CM

## 2025-04-07 RX ORDER — CYCLOBENZAPRINE HCL 10 MG
10 TABLET ORAL 2 TIMES DAILY PRN
Qty: 60 TABLET | Refills: 0 | Status: SHIPPED | OUTPATIENT
Start: 2025-04-07

## 2025-04-10 ENCOUNTER — OFFICE VISIT (OUTPATIENT)
Dept: PSYCHIATRY | Facility: CLINIC | Age: 40
End: 2025-04-10
Payer: OTHER GOVERNMENT

## 2025-04-10 VITALS
HEIGHT: 68 IN | HEART RATE: 123 BPM | BODY MASS INDEX: 19.64 KG/M2 | DIASTOLIC BLOOD PRESSURE: 69 MMHG | SYSTOLIC BLOOD PRESSURE: 112 MMHG | WEIGHT: 129.6 LBS

## 2025-04-10 DIAGNOSIS — F43.10 POST TRAUMATIC STRESS DISORDER (PTSD): ICD-10-CM

## 2025-04-10 DIAGNOSIS — F51.05 INSOMNIA DUE TO MENTAL CONDITION: ICD-10-CM

## 2025-04-10 DIAGNOSIS — S06.9X9D TRAUMATIC BRAIN INJURY WITH LOSS OF CONSCIOUSNESS, SUBSEQUENT ENCOUNTER: ICD-10-CM

## 2025-04-10 DIAGNOSIS — F33.1 MAJOR DEPRESSIVE DISORDER, RECURRENT EPISODE, MODERATE: ICD-10-CM

## 2025-04-10 DIAGNOSIS — F41.0 PANIC ATTACKS: ICD-10-CM

## 2025-04-10 DIAGNOSIS — F90.0 ADHD (ATTENTION DEFICIT HYPERACTIVITY DISORDER), INATTENTIVE TYPE: Primary | ICD-10-CM

## 2025-04-10 DIAGNOSIS — Z79.899 OTHER LONG TERM (CURRENT) DRUG THERAPY: ICD-10-CM

## 2025-04-10 DIAGNOSIS — F41.1 GENERALIZED ANXIETY DISORDER: ICD-10-CM

## 2025-04-10 RX ORDER — DEXTROAMPHETAMINE SACCHARATE, AMPHETAMINE ASPARTATE, DEXTROAMPHETAMINE SULFATE AND AMPHETAMINE SULFATE 2.5; 2.5; 2.5; 2.5 MG/1; MG/1; MG/1; MG/1
10 TABLET ORAL DAILY
Qty: 30 TABLET | Refills: 0 | Status: SHIPPED | OUTPATIENT
Start: 2025-04-19

## 2025-04-10 RX ORDER — DEXTROAMPHETAMINE SACCHARATE, AMPHETAMINE ASPARTATE MONOHYDRATE, DEXTROAMPHETAMINE SULFATE AND AMPHETAMINE SULFATE 6.25; 6.25; 6.25; 6.25 MG/1; MG/1; MG/1; MG/1
25 CAPSULE, EXTENDED RELEASE ORAL DAILY
Qty: 30 CAPSULE | Refills: 0 | Status: SHIPPED | OUTPATIENT
Start: 2025-04-19

## 2025-04-10 NOTE — TREATMENT PLAN
Anxiety:  2/10 progressing    Goals:  Patient will develop and implement behavioral and cognitive strategies to reduce anxiety and irrational fears, monthly, using Rogerian psychotherapy and CBT where appropriate.  Help patient explore past emotional issues in relation to present anxiety, monthly, until remission of symptoms, using Rogerian psychotherapy and CBT where appropriate.  Help patient develop an awareness of their cognitive and physical responses to anxiety, monthly, until remission of symptoms, using Rogerian psychotherapy and CBT where appropriate.          Depression:  2/10 progressing    Goals:  Patient will demonstrate the ability to initiate new constructive life skills outside of sessions on a consistent basis, monthly, using Rogerian psychotherapy and CBT where appropriate.  Assist patient in setting attainable activities of daily living goals, monthly, using Rogerian psychotherapy and CBT where appropriate.  Provide education about depression, monthly, using Rogerian psychotherapy and CBT where appropriate.  Assist patient in developing healthy coping strategies, monthly, using Rogerian psychotherapy and CBT where appropriate.    Rogerian psychotherapy and CBT will be used to help accomplish the above goals and manage depression and anxiety related to trying to quit smoking, family conflict, family well-being, medical conditions       I have discussed and reviewed this treatment plan with the patient.      Reviewed by Sarita Schultz MD   04/10/2025

## 2025-04-10 NOTE — PROGRESS NOTES
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 Realizing she can take a step back from the irritability.    9/1: Improving, but still distractible and tangential today. Also worrying about other people as a replacement of the void of worrying about getting her kids. Increase adderall XR.    8/4: Affect improving. Adderall helping ADHD. Increase dose.     7/7: Patient has gone too long with untreated ADHD.  No seizures in the past.  Start a stimulant.  Close follow-up.  CSA signed, need urine drug screen.    5/23: Stopped depakote on her own. Tolerating strattera. Start vraylar for mood stability (#14 samples).     3/27: Still a great deal of resentment toward the father of her kids. Explored today. No changes as she has improved. Significant ADHD remains, however. Trial of strattera.     2/10: Stopped qelbree (HA). Start guanfacine for anx, adhd. Add cymbalta next visit (pain, dep, anx). Situational stressors: ex-, her mom is sick.     11/8: Increase qelbree to target adhd, dep, anx.     9/27: Start qelbree for ADHD. Residual anxiety.     8/16: Improving. Increase seroquel. Await UDS. Low threshold to start adderall for ADHD (if negative then start and have her sign CSA in 6 " wks).     7/15: Encouraged continued cessation of cannabis. Start seroquel. Suspicion of bipolar. Hyperverbal, not sleeping well.     6/16: Increase depakote for irritability, anxiety, insomnia, HA's, poor appetite.    4/28: Already on propranolol.  Tolerating the Depakote which is helping.  Start Wellbutrin to target depression, anxiety, ADHD.  Patient smokes cannabis so we cannot start a stimulant.     3/3: Start therapy.  TBI, ADHD, PTSD, Panic disorder. R/O bipolar. Has never been on a stimulant. Start depakote at night. Consider klonipin, prazosin, stimulant.       PLAN:  Safety: No acute safety concerns  Therapy: Referral Made  Risk Assessment: Risk of self-harm acutely is moderate.  Risk factors include anxiety disorder, mood disorder, PTSD, AODA, access to weapons, and recent psychosocial stressors (pandemic). Protective factors include no family history, no present SI, no history of suicide attempts or self-harm in the past, healthcare seeking, future orientation, willingness to engage in care.  Risk of self-harm chronically is also moderate, but could be further elevated in the event of treatment noncompliance and/or AODA.  Meds:  CONTINUE Adderall XR 25 mg every morning, CONTINUE Adderall IR 10 mg at noon. Risks, benefits, side effects discussed with patient including elevated heart rate, elevated blood pressure, irritability, insomnia, sexual dysfunction, appetite suppressing properties, psychosis.  After discussion of these risks and benefits, the patient voiced understanding and agreed to proceed. Ronan reviewed, UDS ordered, and controlled substance agreement signed & witnessed.  S/P:  strattera 25 mg daily. Never started 10/23  vraylar 1.5 mg qhs.  Felt more depressed, 7/23.  seroquel 100 mg nightly. Crazy dreams.  propranolol 20 tid. Switched to metoprolol by cardiology.  qelbree 200 to 400 mg daily. HA. 2/23.  guanfacine ER 1 mg nightly. Dizziness. 3/23. We might come back to it.  Depakote 500 to  250 mg p.o. nightly. wg 5/23  Wellbutrin  mg daily. Made her irritable.  Abilify made me crazy.  Zoloft made her paranoid, irritable  Guanfacine lowered her bp too much  Buspar didn't work well  Labs: UDS pos for thc 8/24    Patient screened positive for depression based on a PHQ-9 score of  on . Follow-up recommendations include: Suicide Risk Assessment performed.           TREATMENT PLAN/GOALS: Continue supportive psychotherapy efforts and medications as indicated. Treatment and medication options discussed during today's visit. Patient acknowledged and verbally consented to continue with current treatment plan and was educated on the importance of compliance with treatment and follow-up appointments.    MEDICATION ISSUES:  MAURA reviewed as expected.  Discussed medication options and treatment plan of prescribed medication as well as the risks, benefits, and side effects including potential falls, possible impaired driving and metabolic adversities among others. Patient is agreeable to call the office with any worsening of symptoms or onset of side effects. Patient is agreeable to call 911 or go to the nearest ER should he/she begin having SI/HI. No medication side effects or related complaints today.     MEDS ORDERED DURING VISIT:  New Medications Ordered This Visit   Medications    amphetamine-dextroamphetamine (Adderall) 10 MG tablet     Sig: Take 1 tablet by mouth Daily.     Dispense:  30 tablet     Refill:  0    amphetamine-dextroamphetamine XR (ADDERALL XR) 25 MG 24 hr capsule     Sig: Take 1 capsule by mouth Daily     Dispense:  30 capsule     Refill:  0       Return in about 6 weeks (around 5/22/2025).         This document has been electronically signed by Sarita Schultz MD  April 10, 2025 17:20 EDT      Part of this note may be an electronic transcription/translation of spoken language to printed text using the Dragon Dictation System.

## 2025-05-09 DIAGNOSIS — M54.42 CHRONIC BILATERAL LOW BACK PAIN WITH LEFT-SIDED SCIATICA: ICD-10-CM

## 2025-05-09 DIAGNOSIS — G89.29 CHRONIC BILATERAL LOW BACK PAIN WITH LEFT-SIDED SCIATICA: ICD-10-CM

## 2025-05-09 RX ORDER — CYCLOBENZAPRINE HCL 10 MG
10 TABLET ORAL 2 TIMES DAILY PRN
Qty: 60 TABLET | Refills: 0 | Status: SHIPPED | OUTPATIENT
Start: 2025-05-09

## 2025-05-19 DIAGNOSIS — F90.0 ADHD (ATTENTION DEFICIT HYPERACTIVITY DISORDER), INATTENTIVE TYPE: ICD-10-CM

## 2025-05-19 RX ORDER — DEXTROAMPHETAMINE SACCHARATE, AMPHETAMINE ASPARTATE, DEXTROAMPHETAMINE SULFATE AND AMPHETAMINE SULFATE 2.5; 2.5; 2.5; 2.5 MG/1; MG/1; MG/1; MG/1
10 TABLET ORAL DAILY
Qty: 30 TABLET | Refills: 0 | Status: SHIPPED | OUTPATIENT
Start: 2025-05-21

## 2025-05-19 RX ORDER — DEXTROAMPHETAMINE SACCHARATE, AMPHETAMINE ASPARTATE MONOHYDRATE, DEXTROAMPHETAMINE SULFATE AND AMPHETAMINE SULFATE 6.25; 6.25; 6.25; 6.25 MG/1; MG/1; MG/1; MG/1
25 CAPSULE, EXTENDED RELEASE ORAL DAILY
Qty: 30 CAPSULE | Refills: 0 | Status: SHIPPED | OUTPATIENT
Start: 2025-05-21

## 2025-05-19 NOTE — TELEPHONE ENCOUNTER
REFILL REQUEST:    amphetamine-dextroamphetamine (Adderall) 10 MG tablet (04/19/2025)     amphetamine-dextroamphetamine XR (ADDERALL XR) 25 MG 24 hr capsule (04/19/2025)     F/UP: 05/23/2025.  LOV: 04/10/2025.    LAST UDS:    
Mother

## 2025-05-23 ENCOUNTER — OFFICE VISIT (OUTPATIENT)
Dept: PSYCHIATRY | Facility: CLINIC | Age: 40
End: 2025-05-23
Payer: MEDICARE

## 2025-05-23 VITALS
HEIGHT: 68 IN | HEART RATE: 90 BPM | SYSTOLIC BLOOD PRESSURE: 115 MMHG | WEIGHT: 131.6 LBS | DIASTOLIC BLOOD PRESSURE: 70 MMHG | BODY MASS INDEX: 19.94 KG/M2

## 2025-05-23 DIAGNOSIS — F41.0 PANIC ATTACKS: ICD-10-CM

## 2025-05-23 DIAGNOSIS — F33.42 RECURRENT MAJOR DEPRESSIVE DISORDER, IN FULL REMISSION: ICD-10-CM

## 2025-05-23 DIAGNOSIS — F90.0 ADHD (ATTENTION DEFICIT HYPERACTIVITY DISORDER), INATTENTIVE TYPE: Primary | ICD-10-CM

## 2025-05-23 DIAGNOSIS — Z79.899 OTHER LONG TERM (CURRENT) DRUG THERAPY: ICD-10-CM

## 2025-05-23 DIAGNOSIS — F43.10 POST TRAUMATIC STRESS DISORDER (PTSD): ICD-10-CM

## 2025-05-23 DIAGNOSIS — F41.1 GENERALIZED ANXIETY DISORDER: ICD-10-CM

## 2025-05-23 DIAGNOSIS — F51.05 INSOMNIA DUE TO MENTAL CONDITION: ICD-10-CM

## 2025-05-23 DIAGNOSIS — S06.9X9D TRAUMATIC BRAIN INJURY WITH LOSS OF CONSCIOUSNESS, SUBSEQUENT ENCOUNTER: ICD-10-CM
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
## 2025-06-18 DIAGNOSIS — M54.42 CHRONIC BILATERAL LOW BACK PAIN WITH LEFT-SIDED SCIATICA: ICD-10-CM

## 2025-06-18 DIAGNOSIS — G89.29 CHRONIC BILATERAL LOW BACK PAIN WITH LEFT-SIDED SCIATICA: ICD-10-CM

## 2025-06-18 RX ORDER — CYCLOBENZAPRINE HCL 10 MG
10 TABLET ORAL 2 TIMES DAILY PRN
Qty: 60 TABLET | Refills: 0 | Status: SHIPPED | OUTPATIENT
Start: 2025-06-18

## 2025-06-18 NOTE — TELEPHONE ENCOUNTER
Rx Refill Note  Requested Prescriptions     Pending Prescriptions Disp Refills    cyclobenzaprine (FLEXERIL) 10 MG tablet 60 tablet 0     Sig: Take 1 tablet by mouth 2 (Two) Times a Day As Needed for Muscle Spasms.      Last office visit with prescribing clinician: 8/16/2024   Last telemedicine visit with prescribing clinician: Visit date not found   Next office visit with prescribing clinician: Visit date not found                         Would you like a call back once the refill request has been completed: [] Yes [] No    If the office needs to give you a call back, can they leave a voicemail: [] Yes [] No    Deepak Ocampo Rep  06/18/25, 09:28 EDT

## 2025-06-19 DIAGNOSIS — F90.0 ADHD (ATTENTION DEFICIT HYPERACTIVITY DISORDER), INATTENTIVE TYPE: ICD-10-CM

## 2025-06-19 RX ORDER — DEXTROAMPHETAMINE SACCHARATE, AMPHETAMINE ASPARTATE MONOHYDRATE, DEXTROAMPHETAMINE SULFATE AND AMPHETAMINE SULFATE 6.25; 6.25; 6.25; 6.25 MG/1; MG/1; MG/1; MG/1
25 CAPSULE, EXTENDED RELEASE ORAL DAILY
Qty: 30 CAPSULE | Refills: 0 | Status: SHIPPED | OUTPATIENT
Start: 2025-06-20

## 2025-06-19 RX ORDER — DEXTROAMPHETAMINE SACCHARATE, AMPHETAMINE ASPARTATE, DEXTROAMPHETAMINE SULFATE AND AMPHETAMINE SULFATE 2.5; 2.5; 2.5; 2.5 MG/1; MG/1; MG/1; MG/1
10 TABLET ORAL DAILY
Qty: 30 TABLET | Refills: 0 | Status: SHIPPED | OUTPATIENT
Start: 2025-06-20

## 2025-06-19 NOTE — TELEPHONE ENCOUNTER
Appointment with Sarita Schultz MD (07/24/2025)     POC Medline 12 Panel Urine Drug Screen (08/16/2024 15:06)     CONTROLLED SUBSTANCE AGREEMENT - SCAN - Controlled Substance Agreement // [facility] // 08/16/2024 (08/16/2024)       Patient needs a refill.  Order pended

## 2025-07-08 ENCOUNTER — HOSPITAL ENCOUNTER (EMERGENCY)
Facility: HOSPITAL | Age: 40
Discharge: HOME | End: 2025-07-08
Attending: EMERGENCY MEDICINE | Admitting: EMERGENCY MEDICINE
Payer: MEDICARE

## 2025-07-08 ENCOUNTER — APPOINTMENT (OUTPATIENT)
Dept: GENERAL RADIOLOGY | Facility: HOSPITAL | Age: 40
End: 2025-07-08
Payer: MEDICARE

## 2025-07-08 VITALS
RESPIRATION RATE: 18 BRPM | SYSTOLIC BLOOD PRESSURE: 108 MMHG | WEIGHT: 127.87 LBS | DIASTOLIC BLOOD PRESSURE: 71 MMHG | OXYGEN SATURATION: 100 % | TEMPERATURE: 97.9 F | HEART RATE: 72 BPM | HEIGHT: 68 IN | BODY MASS INDEX: 19.38 KG/M2

## 2025-07-08 DIAGNOSIS — M25.551 RIGHT HIP PAIN: Primary | ICD-10-CM

## 2025-07-08 PROCEDURE — 73502 X-RAY EXAM HIP UNI 2-3 VIEWS: CPT

## 2025-07-08 PROCEDURE — 99283 EMERGENCY DEPT VISIT LOW MDM: CPT

## 2025-07-08 NOTE — Clinical Note
[facility] EMERGENCY ROOM  [address] [place] [address]  Phone: [phone]  Fax: [phone]    Seda Mobley was seen and treated in our emergency department on 7/8/2025.  She may return to work on 07/11/2025.         Thank you for choosing [facility].    Elier Sotelo MD
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
## 2025-07-09 NOTE — DISCHARGE INSTRUCTIONS
You presented today to the emergency department with right hip pain.  He denies any recent injury.  You did report your previous surgery to your right hip.  And the x-ray of your right hip today shows Chronic deformity of the greater trochanter of the right femur and right superior and inferior pubic rami.  This is a chronic diagnosis and 1 that needs to be followed up with your orthopedist.  Follow-up with your primary care within the next week.  Return to the emergency department if symptoms worsen.

## 2025-07-10 ENCOUNTER — OFFICE VISIT (OUTPATIENT)
Dept: FAMILY MEDICINE CLINIC | Facility: CLINIC | Age: 40
End: 2025-07-10
Payer: MEDICARE

## 2025-07-10 VITALS
TEMPERATURE: 97.9 F | OXYGEN SATURATION: 98 % | WEIGHT: 125.2 LBS | DIASTOLIC BLOOD PRESSURE: 72 MMHG | SYSTOLIC BLOOD PRESSURE: 108 MMHG | BODY MASS INDEX: 18.97 KG/M2 | HEIGHT: 68 IN | HEART RATE: 110 BPM

## 2025-07-10 DIAGNOSIS — M25.552 CHRONIC HIP PAIN, BILATERAL: Primary | ICD-10-CM

## 2025-07-10 DIAGNOSIS — G89.29 CHRONIC HIP PAIN, BILATERAL: Primary | ICD-10-CM

## 2025-07-10 DIAGNOSIS — G62.9 NEUROPATHY: ICD-10-CM

## 2025-07-10 DIAGNOSIS — M16.50: ICD-10-CM

## 2025-07-10 DIAGNOSIS — E55.9 VITAMIN D DEFICIENCY: ICD-10-CM

## 2025-07-10 DIAGNOSIS — M25.551 CHRONIC HIP PAIN, BILATERAL: Primary | ICD-10-CM

## 2025-07-10 RX ORDER — METHYLPREDNISOLONE ACETATE 80 MG/ML
80 INJECTION, SUSPENSION INTRA-ARTICULAR; INTRALESIONAL; INTRAMUSCULAR; SOFT TISSUE ONCE
Status: COMPLETED | OUTPATIENT
Start: 2025-07-10 | End: 2025-07-10

## 2025-07-10 RX ORDER — KETOROLAC TROMETHAMINE 30 MG/ML
60 INJECTION, SOLUTION INTRAMUSCULAR; INTRAVENOUS ONCE
Status: COMPLETED | OUTPATIENT
Start: 2025-07-10 | End: 2025-07-10

## 2025-07-10 RX ORDER — ERGOCALCIFEROL 1.25 MG/1
50000 CAPSULE, LIQUID FILLED ORAL WEEKLY
Qty: 13 CAPSULE | Refills: 1 | Status: SHIPPED | OUTPATIENT
Start: 2025-07-10

## 2025-07-10 RX ORDER — PREGABALIN 75 MG/1
75 CAPSULE ORAL 2 TIMES DAILY
Qty: 60 CAPSULE | Refills: 1 | Status: SHIPPED | OUTPATIENT
Start: 2025-07-10

## 2025-07-10 RX ORDER — DICLOFENAC SODIUM 75 MG/1
75 TABLET, DELAYED RELEASE ORAL 2 TIMES DAILY
Qty: 60 TABLET | Refills: 1 | Status: SHIPPED | OUTPATIENT
Start: 2025-07-10

## 2025-07-10 RX ADMIN — METHYLPREDNISOLONE ACETATE 80 MG: 80 INJECTION, SUSPENSION INTRA-ARTICULAR; INTRALESIONAL; INTRAMUSCULAR; SOFT TISSUE at 15:03

## 2025-07-10 RX ADMIN — KETOROLAC TROMETHAMINE 60 MG: 30 INJECTION, SOLUTION INTRAMUSCULAR; INTRAVENOUS at 14:52

## 2025-07-10 NOTE — PROGRESS NOTES
Chief Complaint  Hip Pain (Bilateral hip pain and pelvis pain , seen in ER 7/8, hip/pelvic pain worse last 1-2 weeks)    Subjective        Medical History: has a past medical history of Abnormal Pap smear of cervix, ADHD (attention deficit hyperactivity disorder) (2000), Anemia, Anesthesia complication, Anxiety (2000), Arthritis, Asthma, Cardiac arrest, Chronic pain disorder (2003), Colon polyp, Condition not found (09/04/2015), DDD (degenerative disc disease), lumbar, Depression (200), Disease of thyroid gland (2020), Family history of malignant neoplasm in father (07/02/2021), GERD (gastroesophageal reflux disease), Goiter, Head injury, Heart murmur (2021), Hemorrhoids, History of MRSA infection, History of transfusion (2010), HL (hearing loss), Hyperthyroidism, Irritable bowel syndrome, Kidney stone, Lactose intolerance, Low back pain, Migraine headache, Obsessive-compulsive disorder (2000), Osteoarthritis, Ovarian cyst, Palpitations, Panic disorder (2000), PMS (premenstrual syndrome) (2018), PONV (postoperative nausea and vomiting), Psychiatric illness (2000), PTSD (post-traumatic stress disorder) (2000), Rh incompatibility, Scoliosis (2003), Spinal headache (2009), Thyroid nodule, Tobacco use (02/08/2022), Trace mitral valve regurgitation (02/08/2022), Trauma, Urinary tract infection, Varicella, and Withdrawal symptoms, drug or narcotic (2011).     Surgical History: has a past surgical history that includes Breast Augmentation (01/08/2020); Dilation and curettage, diagnostic / therapeutic (2004); Esophagogastroduodenoscopy (2007 2015); Fat Grafting (01/08/2020); Other surgical history; Hardware Removal (2011 2014 2021); Splenectomy, total (2010); Wrist surgery (2020); ORIF hip fracture w/ capsulotomy (Right); Lung surgery; Bony pelvis surgery; Esophagogastroduodenoscopy (N/A, 09/01/2021); Colonoscopy (N/A, 09/01/2021); Upper gastrointestinal endoscopy; Fracture surgery (1990; 2010); Breast surgery (2010; 2020);  Cosmetic surgery (2020); Hemorrhoid surgery (2014); Scar Revision Breast (Right, 03/15/2023); Tracheostomy tube placement; Scar Revision Breast (N/A, 06/09/2023); [place] tooth extraction (2006); Dilation and curettage of uterus (2004); Pelvic laparoscopy (2004); and Abdominal surgery.     Family History: family history includes Anemia in her brother; Anxiety disorder in her brother, brother, and mother; Arrhythmia in her father; Arthritis in her brother, father, and mother; Asthma in her maternal aunt, sister, and sister; Bipolar disorder in her father, mother, and sister; COPD in her mother; Cancer in her father; Clotting disorder in her maternal grandmother and another family member; Colon cancer in her father and paternal grandmother; Colon polyps in her father; Depression in her brother, brother, brother, brother, father, mother, sister, and sister; Diabetes in her maternal grandfather, maternal grandmother, and another family member; Early death in her paternal uncle; Fainting in her maternal grandmother and mother; Hearing loss in her father; Heart attack in her father, paternal grandfather, and paternal uncle; Heart disease in her father, mother, and paternal grandfather; Heart failure in her father and paternal grandfather; Hyperlipidemia in her brother, brother, father, and paternal grandfather; Hypertension in her brother, brother, brother, brother, brother, father, and paternal grandfather; Irritable bowel syndrome in her brother, brother, and maternal grandmother; Kidney disease in her father; Learning disabilities in her father; Lung cancer in an other family member; No Known Problems in her cousin, maternal aunt, maternal uncle, paternal aunt, and paternal uncle; Osteoporosis in her father; Self-Injurious Behavior  in her sister; Uterine cancer in her paternal grandmother and another family member; Vision loss in her maternal grandmother.     Social History: reports that she is a non-smoker but has  been exposed to tobacco smoke. The exposure started about 24 years ago. She has been exposed to an average of 1 pack per day for the past 22 years. She has never used smokeless tobacco. She reports current alcohol use. She reports current drug use. Frequency: 3.00 times per week. Drug: Marijuana.    Seda Mobley presents to [facility]    Hip Pain         History of Present Illness  The patient presents for evaluation of right hip pain.    She reports severe pain in her right hip, which has been ongoing for a week. The pain is so intense that it hinders her ability to stand upright and participate in daily activities. She describes the pain as a burning sensation that radiates from her pelvis to her hip, down her back, and into her leg. The pain is intermittent but has worsened recently. She also experiences pain in her left hip and back. She has a history of bilateral hip fractures, with the right hip requiring surgical intervention by Dr. Avendano in 10/2010. She has not tried Lyrica. She has been managing the pain with gabapentin 600 mg 3 times a day, but it provides little relief. She has also tried ibuprofen and Tylenol, but these have not been effective. She is currently taking cyclobenzaprine (Flexeril), which helps with neck pain but not with her hip pain. She has received injections in her hips from Dr. Guerrero, but these have not been beneficial. She has tried lidocaine patches, Salonpas, Biofreeze, and IcyHot, but these have not provided relief. She has not tried Aspercreme. She has received anti-inflammatory injections for migraines in the past and is open to trying anything that might help with her current pain. She has undergone nerve mapping, which revealed nerve damage. She has had an MRI of her right hip, which showed arthritis. She has also had a glen placed in her left leg due to a fracture. She has a history of scoliosis and has been referred to pain management in  "the past, but she did not receive a call back. She was previously on diclofenac and believes she needs more of it.    She has a history of gallstones. She has been diagnosed with adenomyosis and was advised to undergo a hysterectomy, but she declined. She has been diagnosed with a goiter and thyroid nodules and was advised to undergo a thyroidectomy if she does not plan to have children. She has been diagnosed with hyperthyroidism and has gained weight since starting Depakote. She has had an ultrasound of her thyroid, which showed nodules, and she was advised to monitor them. She has had breast reconstruction surgery and experiences a sensation like a rubber band around her lung when she takes a deep breath.    PAST SURGICAL HISTORY:  - Bilateral hip fractures with surgical intervention on right hip (10/2010)  - Cole placement in left leg  - Removal of hardware from pelvis and wrist due to rejection  - Breast reconstruction surgery      Objective   Vital Signs:   /72   Pulse 110   Temp 97.9 °F (36.6 °C)   Ht 172.7 cm (68\")   Wt 56.8 kg (125 lb 3.2 oz)   SpO2 98%   BMI 19.04 kg/m²       Wt Readings from Last 3 Encounters:   07/10/25 56.8 kg (125 lb 3.2 oz)   07/08/25 58 kg (127 lb 13.9 oz)   05/23/25 59.7 kg (131 lb 9.6 oz)        BP Readings from Last 3 Encounters:   07/10/25 108/72   07/08/25 108/71   05/23/25 115/70        BMI is within normal parameters. No other follow-up for BMI required.       Physical Exam  Vitals reviewed.   Constitutional:       Appearance: Normal appearance. She is well-developed.   HENT:      Head: Normocephalic and atraumatic.   Eyes:      Conjunctiva/sclera: Conjunctivae normal.      Pupils: Pupils are equal, round, and reactive to light.   Cardiovascular:      Rate and Rhythm: Normal rate and regular rhythm.      Heart sounds: No murmur heard.  Pulmonary:      Effort: Pulmonary effort is normal.      Breath sounds: Normal breath sounds. No wheezing.   Musculoskeletal:        "  General: Tenderness present.      Comments: Right hip pain, previous surgical scars   Skin:     General: Skin is warm and dry.   Neurological:      Mental Status: She is alert and oriented to person, place, and time.   Psychiatric:         Mood and Affect: Mood and affect normal.         Behavior: Behavior normal.         Thought Content: Thought content normal.         Judgment: Judgment normal.        Result Review :  The following data was reviewed by ALDAIR Hayes on 07/10/2025.  Common labs          8/16/2024    15:06   Common Labs   WBC 5.85    Hemoglobin 13.3    Hematocrit 40.5    Platelets 450    Uric Acid 3.2      Data reviewed : previous office notes/surgical notes            Current Outpatient Medications on File Prior to Visit   Medication Sig Dispense Refill    amphetamine-dextroamphetamine (Adderall) 10 MG tablet Take 1 tablet by mouth Daily. 30 tablet 0    amphetamine-dextroamphetamine XR (ADDERALL XR) 25 MG 24 hr capsule Take 1 capsule by mouth Daily 30 capsule 0    cyclobenzaprine (FLEXERIL) 10 MG tablet Take 1 tablet by mouth 2 (Two) Times a Day As Needed for Muscle Spasms. 60 tablet 0    gabapentin (NEURONTIN) 600 MG tablet       MAGNESIUM PO Take  by mouth.      metoprolol succinate XL (TOPROL-XL) 25 MG 24 hr tablet TAKE 1 TABLET BY MOUTH TWICE DAILY 180 tablet 3    midodrine (PROAMATINE) 2.5 MG tablet Take 1 tablet by mouth Daily. 30 tablet 2    Rimegepant Sulfate (Nurtec) 75 MG tablet dispersible tablet 1 tablet by Other route As Needed (for migraine). (Patient taking differently: 1 tablet by Other route As Needed (for migraine). USING SAMPLES VIA PCP) 6 tablet 0    SUMAtriptan (IMITREX) 100 MG tablet Take 1 tablet by mouth As Needed for Migraine. USING SAMPLES VIA PCP      triamcinolone (KENALOG) 0.1 % ointment Apply 1 application  topically to the appropriate area as directed 2 (Two) Times a Day. 80 g 3    [DISCONTINUED] diclofenac (VOLTAREN) 50 MG EC tablet Take 1 tablet by  mouth 2 (Two) Times a Day. 60 tablet 0    [DISCONTINUED] vitamin D (ERGOCALCIFEROL) 1.25 MG (92695 UT) capsule capsule Take 1 capsule by mouth 1 (One) Time Per Week. 13 capsule 1     No current facility-administered medications on file prior to visit.        Assessment and Plan  Diagnoses and all orders for this visit:    1. Chronic hip pain, bilateral (Primary)  -     Ambulatory Referral to Orthopedic Surgery  -     ketorolac (TORADOL) injection 60 mg  -     methylPREDNISolone acetate (DEPO-medrol) injection 80 mg    2. Vitamin D deficiency  Comments:  Will check vitamin D levels continue medication if needed.  Orders:  -     vitamin D (ERGOCALCIFEROL) 1.25 MG (30835 UT) capsule capsule; Take 1 capsule by mouth 1 (One) Time Per Week.  Dispense: 13 capsule; Refill: 1    3. Osteoarthritis of hip due to and not concurrent with trauma  -     Ambulatory Referral to Orthopedic Surgery  -     ketorolac (TORADOL) injection 60 mg  -     methylPREDNISolone acetate (DEPO-medrol) injection 80 mg    4. Neuropathy  -     pregabalin (LYRICA) 75 MG capsule; Take 1 capsule by mouth 2 (Two) Times a Day.  Dispense: 60 capsule; Refill: 1    Other orders  -     diclofenac (VOLTAREN) 75 MG EC tablet; Take 1 tablet by mouth 2 (Two) Times a Day.  Dispense: 60 tablet; Refill: 1        Assessment & Plan  1. Right hip pain.  - The patient's right hip pain is likely due to a combination of previous trauma and arthritis. She reports a burning pain that radiates from her pelvis to her hip, down her back, and into her leg. The pain has been severe for about a week, affecting her daily activities.  - She has a history of bilateral hip fractures with surgical intervention on the right hip. An x-ray taken 2 days ago in the ER showed chronic deformity of the greater trochanter of the right femur.  - The strength of diclofenac will be increased from 50 mg to 75 mg. A trial of Lyrica 75 mg twice daily will be initiated, starting with bedtime doses to  replace gabapentin. If Lyrica proves effective, gabapentin will be discontinued.  - A Toradol injection and a Depo-Medrol injection will be administered today to help reduce inflammation. She is advised to avoid walking barefoot and to consider using Aspercreme for additional relief.    Follow-up  - The patient will follow up in 1 month.    PROCEDURE  Procedure: Administration of Toradol and Depo-Medrol injections    All questions were answered and agreement to proceed was given after the following Pre-Procedure details were reviewed:  - Risks and Benefits: Discussed potential relief of inflammation and pain, possible side effects including allergic reactions, and benefits of reducing pain and inflammation.  - Alternative Options: Discussed alternative pain management options including oral medications and physical therapy.  - Side effects: Discussed potential side effects such as localized pain at the injection site, dizziness, and gastrointestinal issues.  - Consent: Verbal consent obtained from the patient to proceed with the injections.    Intra-Procedure:  - Time-Out: Confirmed patient identity, procedure, and injection sites.  - Site Preparation: The injection sites were cleaned with an antiseptic solution.  - Medication: Administered Toradol and Depo-Medrol injections.    Post-Procedure:  - Tolerance Level: Patient tolerated the procedure well.  - Home Care Instructions: Advised to monitor for any signs of allergic reactions or adverse effects, avoid strenuous activities for the rest of the day, and follow up if any unusual symptoms occur.      Follow Up   Return in about 1 month (around 8/10/2025) for Recheck.  Patient was given instructions and counseling regarding her condition or for health maintenance advice. Please see specific information pulled into the AVS if appropriate.       Part of this note may be electronic transcription/translation of spoken language to printed text using the Dragon dictation  system    Patient or patient representative verbalized consent for the use of Ambient Listening during the visit with  ALDAIR Hayes for chart documentation. 7/10/2025  14:36 EDT

## 2025-07-24 ENCOUNTER — OFFICE VISIT (OUTPATIENT)
Dept: PSYCHIATRY | Facility: CLINIC | Age: 40
End: 2025-07-24
Payer: MEDICARE

## 2025-07-24 VITALS
HEART RATE: 95 BPM | DIASTOLIC BLOOD PRESSURE: 77 MMHG | SYSTOLIC BLOOD PRESSURE: 120 MMHG | BODY MASS INDEX: 18.79 KG/M2 | WEIGHT: 124 LBS | HEIGHT: 68 IN

## 2025-07-24 DIAGNOSIS — F41.1 GENERALIZED ANXIETY DISORDER: ICD-10-CM

## 2025-07-24 DIAGNOSIS — F51.05 INSOMNIA DUE TO MENTAL CONDITION: ICD-10-CM

## 2025-07-24 DIAGNOSIS — F90.0 ADHD (ATTENTION DEFICIT HYPERACTIVITY DISORDER), INATTENTIVE TYPE: Primary | ICD-10-CM

## 2025-07-24 DIAGNOSIS — Z79.899 OTHER LONG TERM (CURRENT) DRUG THERAPY: ICD-10-CM

## 2025-07-24 DIAGNOSIS — F33.42 RECURRENT MAJOR DEPRESSIVE DISORDER, IN FULL REMISSION: ICD-10-CM

## 2025-07-24 DIAGNOSIS — S06.9X9D TRAUMATIC BRAIN INJURY WITH LOSS OF CONSCIOUSNESS, SUBSEQUENT ENCOUNTER: ICD-10-CM

## 2025-07-24 DIAGNOSIS — F43.10 POST TRAUMATIC STRESS DISORDER (PTSD): ICD-10-CM

## 2025-07-24 DIAGNOSIS — F41.0 PANIC ATTACKS: ICD-10-CM

## 2025-07-24 RX ORDER — DEXTROAMPHETAMINE SACCHARATE, AMPHETAMINE ASPARTATE, DEXTROAMPHETAMINE SULFATE AND AMPHETAMINE SULFATE 2.5; 2.5; 2.5; 2.5 MG/1; MG/1; MG/1; MG/1
10 TABLET ORAL DAILY
Qty: 30 TABLET | Refills: 0 | Status: SHIPPED | OUTPATIENT
Start: 2025-07-24

## 2025-07-24 RX ORDER — DEXTROAMPHETAMINE SACCHARATE, AMPHETAMINE ASPARTATE MONOHYDRATE, DEXTROAMPHETAMINE SULFATE AND AMPHETAMINE SULFATE 6.25; 6.25; 6.25; 6.25 MG/1; MG/1; MG/1; MG/1
25 CAPSULE, EXTENDED RELEASE ORAL DAILY
Qty: 30 CAPSULE | Refills: 0 | Status: SHIPPED | OUTPATIENT
Start: 2025-07-24

## 2025-07-24 NOTE — PROGRESS NOTES
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 adderall XR.    8/4: Affect improving. Adderall helping ADHD. Increase dose.     7/7: Patient has gone too long with untreated ADHD.  No seizures in the past.  Start a stimulant.  Close follow-up.  CSA signed, need urine drug screen.    5/23: Stopped depakote on her own. Tolerating strattera. Start vraylar for mood stability (#14 samples).     3/27: Still a great deal of resentment toward the father of her kids. Explored today. No changes as she has improved. Significant ADHD remains, however. Trial of strattera.     2/10: Stopped qelbree (HA). Start guanfacine for anx, adhd. Add cymbalta next visit (pain, dep, anx). Situational stressors: ex-, her mom is sick.     11/8: Increase qelbree to target adhd, dep, anx.     9/27: Start qelbree for ADHD. Residual anxiety.     8/16: Improving. Increase seroquel. Await UDS. Low threshold to start adderall for ADHD (if negative then start and have her sign CSA in 6 wks).     7/15: Encouraged continued cessation of cannabis. Start seroquel. Suspicion of bipolar. Hyperverbal, not sleeping well.     6/16: Increase depakote for irritability, anxiety, insomnia, HA's, poor appetite.    4/28: Already on propranolol.  Tolerating the Depakote which is " helping.  Start Wellbutrin to target depression, anxiety, ADHD.  Patient smokes cannabis so we cannot start a stimulant.     3/3: Start therapy.  TBI, ADHD, PTSD, Panic disorder. R/O bipolar. Has never been on a stimulant. Start depakote at night. Consider klonipin, prazosin, stimulant.       PLAN:  Safety: No acute safety concerns  Therapy: Referral Made  Risk Assessment: Risk of self-harm acutely is moderate.  Risk factors include anxiety disorder, mood disorder, PTSD, AODA, access to weapons, and recent psychosocial stressors (pandemic). Protective factors include no family history, no present SI, no history of suicide attempts or self-harm in the past, healthcare seeking, future orientation, willingness to engage in care.  Risk of self-harm chronically is also moderate, but could be further elevated in the event of treatment noncompliance and/or AODA.  Meds:  CONTINUE Adderall XR 25 mg every morning, CONTINUE Adderall IR 10 mg at noon. Risks, benefits, side effects discussed with patient including elevated heart rate, elevated blood pressure, irritability, insomnia, sexual dysfunction, appetite suppressing properties, psychosis.  After discussion of these risks and benefits, the patient voiced understanding and agreed to proceed. Ronan reviewed, UDS ordered, and controlled substance agreement signed & witnessed.  S/P:  strattera 25 mg daily. Never started 10/23  vraylar 1.5 mg qhs.  Felt more depressed, 7/23.  seroquel 100 mg nightly. Crazy dreams.  propranolol 20 tid. Switched to metoprolol by cardiology.  qelbree 200 to 400 mg daily. HA. 2/23.  guanfacine ER 1 mg nightly. Dizziness. 3/23. We might come back to it.  Depakote 500 to 250 mg p.o. nightly. wg 5/23  Wellbutrin  mg daily. Made her irritable.  Abilify made me crazy.  Zoloft made her paranoid, irritable  Guanfacine lowered her bp too much  Buspar didn't work well  Labs: UDS pos for thc 8/24    Patient screened positive for depression based on a  PHQ-9 score of  on . Follow-up recommendations include: Suicide Risk Assessment performed.           TREATMENT PLAN/GOALS: Continue supportive psychotherapy efforts and medications as indicated. Treatment and medication options discussed during today's visit. Patient acknowledged and verbally consented to continue with current treatment plan and was educated on the importance of compliance with treatment and follow-up appointments.    MEDICATION ISSUES:  MAURA reviewed as expected.  Discussed medication options and treatment plan of prescribed medication as well as the risks, benefits, and side effects including potential falls, possible impaired driving and metabolic adversities among others. Patient is agreeable to call the office with any worsening of symptoms or onset of side effects. Patient is agreeable to call 911 or go to the nearest ER should he/she begin having SI/HI. No medication side effects or related complaints today.     MEDS ORDERED DURING VISIT:  New Medications Ordered This Visit   Medications    amphetamine-dextroamphetamine XR (ADDERALL XR) 25 MG 24 hr capsule     Sig: Take 1 capsule by mouth Daily     Dispense:  30 capsule     Refill:  0    amphetamine-dextroamphetamine (Adderall) 10 MG tablet     Sig: Take 1 tablet by mouth Daily.     Dispense:  30 tablet     Refill:  0       Return in about 6 weeks (around 9/4/2025).         This document has been electronically signed by Sarita Schultz MD  July 24, 2025 16:23 EDT      Part of this note may be an electronic transcription/translation of spoken language to printed text using the Dragon Dictation System.

## 2025-07-24 NOTE — TREATMENT PLAN
Anxiety:  2/10 progressing    Goals:  Patient will develop and implement behavioral and cognitive strategies to reduce anxiety and irrational fears, monthly, using Rogerian psychotherapy and CBT where appropriate.  Help patient explore past emotional issues in relation to present anxiety, monthly, until remission of symptoms, using Rogerian psychotherapy and CBT where appropriate.  Help patient develop an awareness of their cognitive and physical responses to anxiety, monthly, until remission of symptoms, using Rogerian psychotherapy and CBT where appropriate.          Depression:  2/10 progressing    Goals:  Patient will demonstrate the ability to initiate new constructive life skills outside of sessions on a consistent basis, monthly, using Rogerian psychotherapy and CBT where appropriate.  Assist patient in setting attainable activities of daily living goals, monthly, using Rogerian psychotherapy and CBT where appropriate.  Provide education about depression, monthly, using Rogerian psychotherapy and CBT where appropriate.  Assist patient in developing healthy coping strategies, monthly, using Rogerian psychotherapy and CBT where appropriate.    Rogerian psychotherapy and CBT will be used to help accomplish the above goals and manage depression and anxiety related to trying to quit smoking, family conflict, family well-being, medical conditions       I have discussed and reviewed this treatment plan with the patient.      Reviewed by Sarita Schultz MD   07/24/2025

## 2025-08-06 DIAGNOSIS — M54.42 CHRONIC BILATERAL LOW BACK PAIN WITH LEFT-SIDED SCIATICA: ICD-10-CM

## 2025-08-06 DIAGNOSIS — G89.29 CHRONIC BILATERAL LOW BACK PAIN WITH LEFT-SIDED SCIATICA: ICD-10-CM

## 2025-08-06 RX ORDER — CYCLOBENZAPRINE HCL 10 MG
10 TABLET ORAL 2 TIMES DAILY PRN
Qty: 60 TABLET | Refills: 0 | Status: SHIPPED | OUTPATIENT
Start: 2025-08-06

## 2025-08-11 ENCOUNTER — OFFICE VISIT (OUTPATIENT)
Dept: FAMILY MEDICINE CLINIC | Facility: CLINIC | Age: 40
End: 2025-08-11
Payer: MEDICARE

## 2025-08-11 VITALS
DIASTOLIC BLOOD PRESSURE: 78 MMHG | SYSTOLIC BLOOD PRESSURE: 122 MMHG | HEART RATE: 92 BPM | BODY MASS INDEX: 19.37 KG/M2 | WEIGHT: 127.8 LBS | OXYGEN SATURATION: 99 % | HEIGHT: 68 IN | TEMPERATURE: 98.1 F

## 2025-08-11 DIAGNOSIS — G62.9 NEUROPATHY: ICD-10-CM

## 2025-08-11 DIAGNOSIS — M16.50: ICD-10-CM

## 2025-08-11 DIAGNOSIS — M25.551 RIGHT HIP PAIN: ICD-10-CM

## 2025-08-11 DIAGNOSIS — Z12.31 ENCOUNTER FOR SCREENING MAMMOGRAM FOR MALIGNANT NEOPLASM OF BREAST: ICD-10-CM

## 2025-08-11 DIAGNOSIS — M25.552 LEFT HIP PAIN: ICD-10-CM

## 2025-08-11 DIAGNOSIS — Z09 FOLLOW-UP EXAM: Primary | ICD-10-CM

## 2025-08-11 DIAGNOSIS — Z87.81 HISTORY OF TRAUMATIC FRACTURE OF HIP: ICD-10-CM

## 2025-08-11 RX ORDER — PREGABALIN 150 MG/1
150 CAPSULE ORAL 2 TIMES DAILY
Qty: 60 CAPSULE | Refills: 1 | Status: SHIPPED | OUTPATIENT
Start: 2025-08-11

## 2025-08-28 DIAGNOSIS — M54.42 CHRONIC BILATERAL LOW BACK PAIN WITH LEFT-SIDED SCIATICA: ICD-10-CM

## 2025-08-28 DIAGNOSIS — G89.29 CHRONIC BILATERAL LOW BACK PAIN WITH LEFT-SIDED SCIATICA: ICD-10-CM

## 2025-08-28 DIAGNOSIS — F90.0 ADHD (ATTENTION DEFICIT HYPERACTIVITY DISORDER), INATTENTIVE TYPE: ICD-10-CM

## 2025-08-28 RX ORDER — DEXTROAMPHETAMINE SACCHARATE, AMPHETAMINE ASPARTATE, DEXTROAMPHETAMINE SULFATE AND AMPHETAMINE SULFATE 2.5; 2.5; 2.5; 2.5 MG/1; MG/1; MG/1; MG/1
10 TABLET ORAL DAILY
Qty: 30 TABLET | Refills: 0 | Status: SHIPPED | OUTPATIENT
Start: 2025-08-28

## 2025-08-28 RX ORDER — DICLOFENAC SODIUM 75 MG/1
75 TABLET, DELAYED RELEASE ORAL 2 TIMES DAILY
Qty: 60 TABLET | Refills: 1 | Status: SHIPPED | OUTPATIENT
Start: 2025-08-28

## 2025-08-28 RX ORDER — METOPROLOL SUCCINATE 25 MG/1
25 TABLET, EXTENDED RELEASE ORAL 2 TIMES DAILY
Qty: 180 TABLET | Refills: 3 | Status: SHIPPED | OUTPATIENT
Start: 2025-08-28

## 2025-08-28 RX ORDER — CYCLOBENZAPRINE HCL 10 MG
10 TABLET ORAL 2 TIMES DAILY PRN
Qty: 60 TABLET | Refills: 0 | Status: SHIPPED | OUTPATIENT
Start: 2025-08-28

## 2025-08-28 RX ORDER — DEXTROAMPHETAMINE SACCHARATE, AMPHETAMINE ASPARTATE MONOHYDRATE, DEXTROAMPHETAMINE SULFATE AND AMPHETAMINE SULFATE 6.25; 6.25; 6.25; 6.25 MG/1; MG/1; MG/1; MG/1
25 CAPSULE, EXTENDED RELEASE ORAL DAILY
Qty: 30 CAPSULE | Refills: 0 | Status: SHIPPED | OUTPATIENT
Start: 2025-08-28

## (undated) DEVICE — PROXIMATE RH ROTATING HEAD SKIN STAPLERS (35 WIDE) CONTAINS 35 STAINLESS STEEL STAPLES: Brand: PROXIMATE

## (undated) DEVICE — ANTIBACTERIAL VIOLET BRAIDED (POLYGLACTIN 910), SYNTHETIC ABSORBABLE SUTURE: Brand: COATED VICRYL

## (undated) DEVICE — DRSNG PAD ABD 8X10IN STRL

## (undated) DEVICE — SLV SCD KN/LEN ADJ EXPRSS BLENDED MD 1P/U

## (undated) DEVICE — PACK,UNIVERSAL,NO GOWNS: Brand: MEDLINE

## (undated) DEVICE — EGD OR ERCP KIT: Brand: MEDLINE INDUSTRIES, INC.

## (undated) DEVICE — ADHS SKIN SURG TISS VISC PREMIERPRO EXOFIN HI/VISC FAST/DRY

## (undated) DEVICE — SOL IRRG H2O PL/BG 1000ML STRL

## (undated) DEVICE — Device: Brand: DEFENDO AIR/WATER/SUCTION AND BIOPSY VALVE

## (undated) DEVICE — INTENDED FOR TISSUE SEPARATION, AND OTHER PROCEDURES THAT REQUIRE A SHARP SURGICAL BLADE TO PUNCTURE OR CUT.: Brand: BARD-PARKER ® CARBON RIB-BACK BLADES

## (undated) DEVICE — NON-WOVEN ADHESIVE WOUND DRESSING: Brand: PRIMAPORE ADHESIVE WOUND DRSG 7.2*5CM

## (undated) DEVICE — PENCL E/S SMOKEEVAC W/TELESCP CANN

## (undated) DEVICE — STERILE POLYISOPRENE POWDER-FREE SURGICAL GLOVES WITH EMOLLIENT COATING: Brand: PROTEXIS

## (undated) DEVICE — BIOPATCH™ ANTIMICROBIAL DRESSING WITH CHLORHEXIDINE GLUCONATE IS A HYDROPHILLIC POLYURETHANE ABSORPTIVE FOAM WITH CHLORHEXIDINE GLUCONATE (CHG) WHICH INHIBITS BACTERIAL GROWTH UNDER THE DRESSING. THE DRESSING IS INTENDED TO BE USED TO ABSORB EXUDATE, COVER A WOUND CAUSED BY VASCULAR AND NONVASCULAR PERCUTANEOUS MEDICAL DEVICES DURING SURGERY, AS WELL AS REDUCE LOCAL INFECTION AND COLONIZATION OF MICROORGANISMS.: Brand: BIOPATCH

## (undated) DEVICE — PLASTIC MINOR PACK-LF: Brand: MEDLINE INDUSTRIES, INC.

## (undated) DEVICE — DRN WND JP RND W TROC SIL 10F 1/8IN

## (undated) DEVICE — CVR HNDL LT SURG ACCSSRY BLU STRL

## (undated) DEVICE — SUT MNCRYL PLS ANTIB UD 3/0 PS2 27IN

## (undated) DEVICE — DRSNG GZ CURAD XEROFORM NONADHS 5X9IN STRL

## (undated) DEVICE — COLON KIT: Brand: MEDLINE INDUSTRIES, INC.

## (undated) DEVICE — GOWN,REINFORCE,POLY,SIRUS,BREATH SLV,XLG: Brand: MEDLINE

## (undated) DEVICE — SINGLE-USE BIOPSY FORCEPS: Brand: RADIAL JAW 4

## (undated) DEVICE — GLV SURG BIOGEL LTX PF 7 1/2

## (undated) DEVICE — COVER,LIGHT HANDLE,FLX,1/PK: Brand: MEDLINE INDUSTRIES, INC.

## (undated) DEVICE — SYS CLS SKIN PREMIERPRO EXOFINFUSION 22CM

## (undated) DEVICE — JACKSON-PRATT 100CC BULB RESERVOIR: Brand: CARDINAL HEALTH